# Patient Record
Sex: MALE | Race: WHITE | NOT HISPANIC OR LATINO | Employment: OTHER | URBAN - METROPOLITAN AREA
[De-identification: names, ages, dates, MRNs, and addresses within clinical notes are randomized per-mention and may not be internally consistent; named-entity substitution may affect disease eponyms.]

---

## 2017-11-20 ENCOUNTER — GENERIC CONVERSION - ENCOUNTER (OUTPATIENT)
Dept: OTHER | Facility: OTHER | Age: 62
End: 2017-11-20

## 2018-01-22 VITALS
HEART RATE: 84 BPM | SYSTOLIC BLOOD PRESSURE: 140 MMHG | RESPIRATION RATE: 20 BRPM | HEIGHT: 68 IN | WEIGHT: 261 LBS | TEMPERATURE: 98.3 F | DIASTOLIC BLOOD PRESSURE: 102 MMHG | BODY MASS INDEX: 39.56 KG/M2

## 2018-02-12 ENCOUNTER — OFFICE VISIT (OUTPATIENT)
Dept: FAMILY MEDICINE CLINIC | Facility: CLINIC | Age: 63
End: 2018-02-12
Payer: COMMERCIAL

## 2018-02-12 VITALS
RESPIRATION RATE: 20 BRPM | DIASTOLIC BLOOD PRESSURE: 74 MMHG | TEMPERATURE: 99.4 F | BODY MASS INDEX: 40.16 KG/M2 | WEIGHT: 265 LBS | HEART RATE: 92 BPM | SYSTOLIC BLOOD PRESSURE: 134 MMHG | HEIGHT: 68 IN

## 2018-02-12 DIAGNOSIS — J01.00 ACUTE NON-RECURRENT MAXILLARY SINUSITIS: Primary | ICD-10-CM

## 2018-02-12 PROCEDURE — 99213 OFFICE O/P EST LOW 20 MIN: CPT | Performed by: NURSE PRACTITIONER

## 2018-02-12 PROCEDURE — 3008F BODY MASS INDEX DOCD: CPT | Performed by: NURSE PRACTITIONER

## 2018-02-12 RX ORDER — AMOXICILLIN AND CLAVULANATE POTASSIUM 875; 125 MG/1; MG/1
1 TABLET, FILM COATED ORAL EVERY 12 HOURS SCHEDULED
Qty: 20 TABLET | Refills: 0 | Status: SHIPPED | OUTPATIENT
Start: 2018-02-12 | End: 2018-02-22

## 2018-02-12 NOTE — PATIENT INSTRUCTIONS

## 2018-02-12 NOTE — LETTER
February 12, 2018     Patient: Kae Lias   YOB: 1955   Date of Visit: 2/12/2018       To Whom it May Concern:    Jaz Collazo is under my professional care  He was seen in my office on 2/12/2018  He may return to work on 2/14/18  If you have any questions or concerns, please don't hesitate to call           Sincerely,          ELIEL Bowie        CC: No Recipients

## 2018-02-12 NOTE — PROGRESS NOTES
Assessment/Plan:       Diagnoses and all orders for this visit:    Acute non-recurrent maxillary sinusitis  -     amoxicillin-clavulanate (AUGMENTIN) 875-125 mg per tablet; Take 1 tablet by mouth every 12 (twelve) hours for 10 days  Drinking plenty of fluids, saline nasal rinses or nasal spray, and steam or cool air humidification are all effective ways of managing symptoms  May take Mucinex D for sinus congestion, or Coricidin HBP if you have a heart condition or high blood pressure  Mucinex or Robitussin DM are used to control cough symptoms and include an expectorant  May take Ibuprofen or Tylenol as needed for pain or fevers  Recommend recheck if symptoms do not resolve or improve within 1 week  BMI 40 0-44 9, adult ([facility] 75 )              No Follow-up on file  Subjective:      Patient ID: Boyd Byrnes is a 57 y o  male  Chief Complaint   Patient presents with    Sore Throat     Started 4 days ago  Afebrile  4-5 days ago he developed sinus congestion, sore throat, cough  Denies fevers, SOB, or wheezing  Cough is intermittently productive  He is taking Delsym and Mucinex OTC which do help  Denies abdominal pain, n/v/d  He did not have a flu shot this season  The following portions of the patient's history were reviewed and updated as appropriate: allergies, current medications, past family history, past medical history, past social history, past surgical history and problem list     Review of Systems   Constitutional: Positive for chills and fatigue  Negative for fever  HENT: Positive for congestion, sinus pressure and sore throat  Negative for ear pain, postnasal drip and rhinorrhea  Respiratory: Positive for cough  Negative for shortness of breath and wheezing  Cardiovascular: Negative for chest pain  Gastrointestinal: Negative for abdominal pain, diarrhea, nausea and vomiting  Musculoskeletal: Negative for arthralgias  Skin: Negative for rash  Neurological: Negative for headaches  Current Outpatient Prescriptions   Medication Sig Dispense Refill    Multiple Vitamin (MULTIVITAMINS PO) Take by mouth Daily      amoxicillin-clavulanate (AUGMENTIN) 875-125 mg per tablet Take 1 tablet by mouth every 12 (twelve) hours for 10 days 20 tablet 0     No current facility-administered medications for this visit  Objective:    /74   Pulse 92   Temp 99 4 °F (37 4 °C)   Resp 20   Ht 5' 8" (1 727 m)   Wt 120 kg (265 lb)   BMI 40 29 kg/m²        Physical Exam   Constitutional: He appears well-developed and well-nourished  HENT:   Right Ear: External ear and ear canal normal  Tympanic membrane is bulging (purulent effusion)  Left Ear: External ear and ear canal normal  Tympanic membrane is bulging  Nose: No mucosal edema  Left sinus exhibits maxillary sinus tenderness  Mouth/Throat: Oropharynx is clear and moist and mucous membranes are normal    Eyes: Conjunctivae are normal    Cardiovascular: Normal rate, regular rhythm and normal heart sounds  Pulmonary/Chest: Effort normal and breath sounds normal    Abdominal: Bowel sounds are normal  He exhibits no distension  There is no splenomegaly or hepatomegaly  There is no tenderness  Lymphadenopathy:        Right cervical: No superficial cervical adenopathy present  Left cervical: No superficial cervical adenopathy present  Skin: No rash noted  Psychiatric: He has a normal mood and affect                Albino Rayray

## 2019-04-25 ENCOUNTER — TELEPHONE (OUTPATIENT)
Dept: FAMILY MEDICINE CLINIC | Facility: CLINIC | Age: 64
End: 2019-04-25

## 2019-08-31 ENCOUNTER — OFFICE VISIT (OUTPATIENT)
Dept: URGENT CARE | Facility: CLINIC | Age: 64
End: 2019-08-31
Payer: COMMERCIAL

## 2019-08-31 VITALS
WEIGHT: 256.6 LBS | RESPIRATION RATE: 16 BRPM | HEART RATE: 73 BPM | SYSTOLIC BLOOD PRESSURE: 165 MMHG | HEIGHT: 68 IN | BODY MASS INDEX: 38.89 KG/M2 | OXYGEN SATURATION: 97 % | TEMPERATURE: 99.4 F | DIASTOLIC BLOOD PRESSURE: 88 MMHG

## 2019-08-31 DIAGNOSIS — J06.9 ACUTE URI: Primary | ICD-10-CM

## 2019-08-31 PROCEDURE — 99213 OFFICE O/P EST LOW 20 MIN: CPT | Performed by: PHYSICIAN ASSISTANT

## 2019-08-31 RX ORDER — FLUTICASONE PROPIONATE 50 MCG
2 SPRAY, SUSPENSION (ML) NASAL DAILY
Qty: 16 G | Refills: 0 | Status: SHIPPED | OUTPATIENT
Start: 2019-08-31 | End: 2022-03-21

## 2019-08-31 NOTE — PROGRESS NOTES
[facility]        NAME: Leif Yancey is a 75 y o  male  : 1955    MRN: [id number]  DATE: 2019  TIME: 2:39 PM    Assessment and Plan   Acute URI [J06 9]  1  Acute URI  fluticasone (FLONASE) 50 mcg/act nasal spray     Patient Instructions     Continue Mucinex for nasal congestion relief  Use flonase as directed  Chloraseptic spray, saltwater gargles, warm tea with honey, and throat lozenges may be relieving of throat discomfort  Use a cool mist humidifier at bedtime, turning on hours prior to bed with your bedroom doors shut for maximum relief  Follow up with your family doctor in 3-5 days  Proceed to the ER if symptoms worsen  Advised patient to initiate an antihistamine such as Zyrtec for seasonal/environmental allergies  The diagnosis, viral vs  allergic etiology, expected course of illness, and treatment plan were reviewed  All questions answered  Precautions given  Patient verbalized understanding and agreement with the treatment plan  Chief Complaint     Chief Complaint   Patient presents with    Cold Like Symptoms     sore throat x 1 week  productive cough yellow/green      History of Present Illness   24-year-old male presenting with complaint of sore throat x 5 days  Sx associated with NC, runny nose, PND, and an intermittently productive cough  Wife reports he felt warm early in illness but no temperature taken  Pt denies feeling feverish  He notes sx are worse in the morning, then tend to decrease as the day goes on  He has attempted treating with Mucinex with increased mucus production and slight relief of sx  He denies any chills, sweats, fatigue, chest tightness/pain, SOB, wheezing, abd pain, N/V/D  No sick contacts  No recent travel  Nonsmoker  His wife reports seasonal and environmental allergies that he does not treat, and believes ongoing inflammation from this may have caused current infection       Review of Systems   Review of Systems   Constitutional: Negative for chills, diaphoresis and fatigue  HENT: Positive for congestion, postnasal drip, rhinorrhea and sore throat  Negative for ear pain  Respiratory: Positive for cough  Negative for chest tightness, shortness of breath and wheezing  Gastrointestinal: Negative for abdominal pain, diarrhea, nausea and vomiting  Musculoskeletal: Negative for arthralgias and myalgias  Skin: Negative for rash  Neurological: Negative for dizziness and headaches  Current Medications       Current Outpatient Medications:     fluticasone (FLONASE) 50 mcg/act nasal spray, 2 sprays into each nostril daily, Disp: 16 g, Rfl: 0    Multiple Vitamin (MULTIVITAMINS PO), Take by mouth Daily, Disp: , Rfl:     Current Allergies     Allergies as of 08/31/2019    (No Known Allergies)            The following portions of the patient's history were reviewed and updated as appropriate: allergies, current medications, past family history, past medical history, past social history, past surgical history and problem list      Past Medical History:   Diagnosis Date    Abnormal weight gain 06/12/2008    LAST ASSESSED: 6/12/08    Anal fissure 03/05/2010    LAST ASSESSED: 3/5/10     History reviewed  No pertinent surgical history  Family History   Problem Relation Age of Onset    Diabetes Mother         MELLITUS    Hypertension Mother      Medications have been verified  Objective   /88   Pulse 73   Temp 99 4 °F (37 4 °C)   Resp 16   Ht 5' 7 5" (1 715 m)   Wt 116 kg (256 lb 9 6 oz)   SpO2 97%   BMI 39 60 kg/m²      Physical Exam     Physical Exam   Constitutional: He is oriented to person, place, and time  Vital signs are normal  He appears well-developed and well-nourished  He is cooperative  He does not appear ill  No distress  HENT:   Head: Normocephalic and atraumatic  Right Ear: Hearing, external ear and ear canal normal  Tympanic membrane is perforated   Tympanic membrane is not injected, not erythematous, not retracted and not bulging  No middle ear effusion  Left Ear: Hearing, tympanic membrane, external ear and ear canal normal   No middle ear effusion  Nose: Mucosal edema and rhinorrhea present  Mouth/Throat: Uvula is midline and mucous membranes are normal  Mucous membranes are not pale, not dry and not cyanotic  No oral lesions  No uvula swelling  Posterior oropharyngeal erythema (injected) present  No oropharyngeal exudate, posterior oropharyngeal edema or tonsillar abscesses  Tonsils are 0 on the right  Tonsils are 0 on the left  NC and PND present  Eyes: Conjunctivae and lids are normal  Right eye exhibits no discharge and no exudate  Left eye exhibits no discharge and no exudate  Neck: Trachea normal and phonation normal  Neck supple  No tracheal tenderness present  No neck rigidity  No edema and no erythema present  Cardiovascular: Normal rate, regular rhythm and normal heart sounds  Exam reveals no gallop, no distant heart sounds and no friction rub  No murmur heard  Pulmonary/Chest: Effort normal and breath sounds normal  No stridor  No respiratory distress  He has no decreased breath sounds  He has no wheezes  He has no rhonchi  He has no rales  Abdominal: Soft  Bowel sounds are normal  He exhibits no distension and no mass  There is no tenderness  There is no rigidity, no rebound and no guarding  Lymphadenopathy:     He has cervical adenopathy (NT)  Right cervical: Superficial cervical adenopathy present  Left cervical: Superficial cervical adenopathy present  Neurological: He is alert and oriented to person, place, and time  He is not disoriented  No cranial nerve deficit  He exhibits normal muscle tone  Coordination normal    Skin: Skin is warm, dry and intact  No rash noted  He is not diaphoretic  No erythema  No pallor  Psychiatric: He has a normal mood and affect   His behavior is normal  Judgment and thought content normal    Nursing note and vitals reviewed

## 2019-08-31 NOTE — PATIENT INSTRUCTIONS
Continue Mucinex for nasal congestion relief  Use flonase as directed  Chloraseptic spray, saltwater gargles, warm tea with honey, and throat lozenges may be relieving of throat discomfort  Use a cool mist humidifier at bedtime, turning on hours prior to bed with your bedroom doors shut for maximum relief  Follow up with your family doctor in 3-5 days  Proceed to the ER if symptoms worsen  Upper Respiratory Infection   WHAT YOU NEED TO KNOW:   An upper respiratory infection is also called the common cold  It is an infection that can affect your nose, throat, ears, and sinuses  For healthy people, the common cold is usually not serious and does not need special treatment  Cold symptoms are usually worst for the first 3 to 5 days  Most people get better in 7 to 14 days  You may continue to cough for 2 to 3 weeks  Colds are caused by viruses and do not get better with antibiotics  DISCHARGE INSTRUCTIONS:   Return to the emergency department if:   · You have chest pain or trouble breathing  Contact your healthcare provider if:   · You have a fever over 102ºF (39°C)  · Your sore throat gets worse or you see white or yellow spots in your throat  · Your symptoms get worse after 3 to 5 days or your cold is not better in 14 days  · You have a rash anywhere on your skin  · You have large, tender lumps in your neck  · You have thick, green or yellow drainage from your nose  · You cough up thick yellow, green, or bloody mucus  · You have vomiting for more than 24 hours and cannot keep fluids down  · You have a bad earache  · You have questions or concerns about your condition or care  Medicines: You may need any of the following:  · Decongestants  help reduce nasal congestion and help you breathe more easily  If you take decongestant pills, they may make you feel restless or cause problems with your sleep  Do not use decongestant sprays for more than a few days      · Cough suppressants help reduce coughing  Ask your healthcare provider which type of cough medicine is best for you  · NSAIDs , such as ibuprofen, help decrease swelling, pain, and fever  NSAIDs can cause stomach bleeding or kidney problems in certain people  If you take blood thinner medicine, always ask your healthcare provider if NSAIDs are safe for you  Always read the medicine label and follow directions  · Acetaminophen  decreases pain and fever  It is available without a doctor's order  Ask how much to take and how often to take it  Follow directions  Read the labels of all other medicines you are using to see if they also contain acetaminophen, or ask your doctor or pharmacist  Acetaminophen can cause liver damage if not taken correctly  Do not use more than 4 grams (4,000 milligrams) total of acetaminophen in one day  · Take your medicine as directed  Contact your healthcare provider if you think your medicine is not helping or if you have side effects  Tell him or her if you are allergic to any medicine  Keep a list of the medicines, vitamins, and herbs you take  Include the amounts, and when and why you take them  Bring the list or the pill bottles to follow-up visits  Carry your medicine list with you in case of an emergency  Follow up with your healthcare provider as directed:  Write down your questions so you remember to ask them during your visits  Self-care:   · Rest as much as possible  Slowly start to do more each day  · Drink more liquids as directed  Liquids will help thin and loosen mucus so you can cough it up  Liquids will also help prevent dehydration  Liquids that help prevent dehydration include water, fruit juice, and broth  Do not drink liquids that contain caffeine  Caffeine can increase your risk for dehydration  Ask your healthcare provider how much liquid to drink each day  · Soothe a sore throat  Gargle with warm salt water  This helps your sore throat feel better   Make salt water by dissolving ¼ teaspoon salt in 1 cup warm water  You may also suck on hard candy or throat lozenges  You may use a sore throat spray  · Use a humidifier or vaporizer  Use a cool mist humidifier or a vaporizer to increase air moisture in your home  This may make it easier for you to breathe and help decrease your cough  · Use saline nasal drops as directed  These help relieve congestion  · Apply petroleum-based jelly around the outside of your nostrils  This can decrease irritation from blowing your nose  · Do not smoke  Nicotine and other chemicals in cigarettes and cigars can make your symptoms worse  They can also cause infections such as bronchitis or pneumonia  Ask your healthcare provider for information if you currently smoke and need help to quit  E-cigarettes or smokeless tobacco still contain nicotine  Talk to your healthcare provider before you use these products  Prevent spreading your cold to others:   · Try to stay away from other people during the first 2 to 3 days of your cold when it is more easily spread  · Do not share food or drinks  · Do not share hand towels with household members  · Wash your hands often, especially after you blow your nose  Turn away from other people and cover your mouth and nose with a tissue when you sneeze or cough  © 2017 [facility] Information is for End User's use only and may not be sold, redistributed or otherwise used for commercial purposes  All illustrations and images included in CareNotes® are the copyrighted property of [employer]  or [employer]  The above information is an  only  It is not intended as medical advice for individual conditions or treatments  Talk to your doctor, nurse or pharmacist before following any medical regimen to see if it is safe and effective for you

## 2020-03-13 ENCOUNTER — TELEPHONE (OUTPATIENT)
Dept: FAMILY MEDICINE CLINIC | Facility: CLINIC | Age: 65
End: 2020-03-13

## 2021-01-29 ENCOUNTER — OFFICE VISIT (OUTPATIENT)
Dept: FAMILY MEDICINE CLINIC | Facility: CLINIC | Age: 66
End: 2021-01-29
Payer: MEDICARE

## 2021-01-29 VITALS
HEIGHT: 68 IN | BODY MASS INDEX: 37.89 KG/M2 | WEIGHT: 250 LBS | SYSTOLIC BLOOD PRESSURE: 130 MMHG | HEART RATE: 89 BPM | RESPIRATION RATE: 16 BRPM | OXYGEN SATURATION: 99 % | TEMPERATURE: 98.3 F | DIASTOLIC BLOOD PRESSURE: 80 MMHG

## 2021-01-29 DIAGNOSIS — Z23 IMMUNIZATION DUE: ICD-10-CM

## 2021-01-29 DIAGNOSIS — Z12.5 PROSTATE CANCER SCREENING: ICD-10-CM

## 2021-01-29 DIAGNOSIS — Z11.59 NEED FOR HEPATITIS C SCREENING TEST: ICD-10-CM

## 2021-01-29 DIAGNOSIS — Z00.00 MEDICARE ANNUAL WELLNESS VISIT, INITIAL: Primary | ICD-10-CM

## 2021-01-29 DIAGNOSIS — Z13.89 SCREENING FOR CARDIOVASCULAR, RESPIRATORY, AND GENITOURINARY DISEASES: ICD-10-CM

## 2021-01-29 DIAGNOSIS — Z12.11 COLON CANCER SCREENING: ICD-10-CM

## 2021-01-29 DIAGNOSIS — E66.9 OBESITY (BMI 30-39.9): ICD-10-CM

## 2021-01-29 DIAGNOSIS — Z13.6 SCREENING FOR CARDIOVASCULAR, RESPIRATORY, AND GENITOURINARY DISEASES: ICD-10-CM

## 2021-01-29 DIAGNOSIS — Z13.1 SCREENING FOR DIABETES MELLITUS (DM): ICD-10-CM

## 2021-01-29 DIAGNOSIS — Z13.83 SCREENING FOR CARDIOVASCULAR, RESPIRATORY, AND GENITOURINARY DISEASES: ICD-10-CM

## 2021-01-29 PROCEDURE — 90715 TDAP VACCINE 7 YRS/> IM: CPT | Performed by: FAMILY MEDICINE

## 2021-01-29 PROCEDURE — 1123F ACP DISCUSS/DSCN MKR DOCD: CPT | Performed by: FAMILY MEDICINE

## 2021-01-29 PROCEDURE — G0438 PPPS, INITIAL VISIT: HCPCS | Performed by: FAMILY MEDICINE

## 2021-01-29 PROCEDURE — 90471 IMMUNIZATION ADMIN: CPT | Performed by: FAMILY MEDICINE

## 2021-01-29 PROCEDURE — 90670 PCV13 VACCINE IM: CPT | Performed by: FAMILY MEDICINE

## 2021-01-29 PROCEDURE — G0009 ADMIN PNEUMOCOCCAL VACCINE: HCPCS | Performed by: FAMILY MEDICINE

## 2021-01-29 NOTE — PROGRESS NOTES
Assessment/Plan:    No problem-specific Assessment & Plan notes found for this encounter  Diagnoses and all orders for this visit:    Medicare annual wellness visit, initial    Screening for cardiovascular, respiratory, and genitourinary diseases  -     Lipid Panel with Direct LDL reflex; Future    Screening for diabetes mellitus (DM)  -     Comprehensive metabolic panel; Future    Prostate cancer screening  -     PSA, Total Screen; Future    Colon cancer screening  -     Ambulatory referral to Gastroenterology; Future    Immunization due  -     PNEUMOCOCCAL CONJUGATE VACCINE 13-VALENT GREATER THAN 6 MONTHS  -     TDAP VACCINE GREATER THAN OR EQUAL TO 8YO IM    Obesity (BMI 30-39  9)    Need for hepatitis C screening test  -     Hepatitis C antibody; Future              No follow-ups on file  Subjective:      Patient ID: John Delacruz is a 77 y o  male  Chief Complaint   Patient presents with    Physical Exam     wmcma       HPI  Here for AWV-initial  Medicare started 2y ago    Grandchild on way  Wants [facility]    Eye doctor [facility] [phone]   2x/w exercise, bike    The following portions of the patient's history were reviewed and updated as appropriate: allergies, current medications, past family history, past medical history, past social history, past surgical history and problem list     Review of Systems   Respiratory: Negative for shortness of breath  Cardiovascular: Negative for chest pain  Current Outpatient Medications   Medication Sig Dispense Refill    fluticasone (FLONASE) 50 mcg/act nasal spray 2 sprays into each nostril daily 16 g 0    Multiple Vitamin (MULTIVITAMINS PO) Take by mouth Daily       No current facility-administered medications for this visit  Objective:    /80   Pulse 89   Temp 98 3 °F (36 8 °C)   Resp 16   Ht 5' 8" (1 727 m)   Wt 113 kg (250 lb)   SpO2 99%   BMI 38 01 kg/m²        Physical Exam  Vitals signs and nursing note reviewed  Constitutional:       Appearance: He is well-developed  HENT:      Head: Normocephalic  Eyes:      Conjunctiva/sclera: Conjunctivae normal    Neck:      Musculoskeletal: Neck supple  Cardiovascular:      Rate and Rhythm: Normal rate  Pulmonary:      Effort: Pulmonary effort is normal  No respiratory distress  Abdominal:      Palpations: Abdomen is soft  Genitourinary:     Penis: Normal        Scrotum/Testes: Normal       Prostate: Normal       Rectum: Normal    Musculoskeletal:         General: No deformity  Skin:     General: Skin is warm and dry  Neurological:      Mental Status: He is alert  Psychiatric:         Behavior: Behavior normal          Thought Content: Thought content normal        BMI Counseling: Body mass index is 38 01 kg/m²  The BMI is above normal  Nutrition recommendations include decreasing portion sizes and moderation in carbohydrate intake  Exercise recommendations include exercising 3-5 times per week  No pharmacotherapy was ordered                  Nadia Klein DO

## 2021-01-30 NOTE — PATIENT INSTRUCTIONS

## 2021-01-30 NOTE — PROGRESS NOTES
Assessment and Plan:     Problem List Items Addressed This Visit        Active Problems    Obesity (BMI 30-39  9)    Immunization due    Relevant Orders    PNEUMOCOCCAL CONJUGATE VACCINE 13-VALENT GREATER THAN 6 MONTHS (Completed)    TDAP VACCINE GREATER THAN OR EQUAL TO 6YO IM (Completed)    Colon cancer screening    Relevant Orders    Ambulatory referral to Gastroenterology    Prostate cancer screening    Relevant Orders    PSA, Total Screen    Screening for diabetes mellitus (DM)    Relevant Orders    Comprehensive metabolic panel    Screening for cardiovascular, respiratory, and genitourinary diseases    Relevant Orders    Lipid Panel with Direct LDL reflex    Medicare annual wellness visit, initial - Primary    Need for hepatitis C screening test    Relevant Orders    Hepatitis C antibody           Preventive health issues were discussed with patient, and age appropriate screening tests were ordered as noted in patient's After Visit Summary  Personalized health advice and appropriate referrals for health education or preventive services given if needed, as noted in patient's After Visit Summary  History of Present Illness:     Patient presents for Medicare Annual Wellness visit    Patient Care Team:  Charanjit Spears DO as PCP - General     Problem List:     Patient Active Problem List   Diagnosis    Allergic rhinitis    BMI 38 0-38 9,adult    Obesity (BMI 30-39  9)    Immunization due    Colon cancer screening    Prostate cancer screening    Screening for diabetes mellitus (DM)    Screening for cardiovascular, respiratory, and genitourinary diseases    Medicare annual wellness visit, initial    Need for hepatitis C screening test      Past Medical and Surgical History:     Past Medical History:   Diagnosis Date    Abnormal weight gain 06/12/2008    LAST ASSESSED: 6/12/08    Anal fissure 03/05/2010    LAST ASSESSED: 3/5/10     History reviewed  No pertinent surgical history     Family History: Family History   Problem Relation Age of Onset    Diabetes Mother         MELLITUS    Hypertension Mother       Social History:        Social History     Socioeconomic History    Marital status: /Civil Union     Spouse name: None    Number of children: None    Years of education: None    Highest education level: None   Occupational History    None   Social Needs    Financial resource strain: None    Food insecurity     Worry: None     Inability: None    Transportation needs     Medical: None     Non-medical: None   Tobacco Use    Smoking status: Former Smoker    Smokeless tobacco: Never Used   Substance and Sexual Activity    Alcohol use: Never     Frequency: Never    Drug use: Never    Sexual activity: None   Lifestyle    Physical activity     Days per week: None     Minutes per session: None    Stress: None   Relationships    Social connections     Talks on phone: None     Gets together: None     Attends Baptism service: None     Active member of club or organization: None     Attends meetings of clubs or organizations: None     Relationship status: None    Intimate partner violence     Fear of current or ex partner: None     Emotionally abused: None     Physically abused: None     Forced sexual activity: None   Other Topics Concern    None   Social History Narrative    None      Medications and Allergies:     Current Outpatient Medications   Medication Sig Dispense Refill    fluticasone (FLONASE) 50 mcg/act nasal spray 2 sprays into each nostril daily 16 g 0    Multiple Vitamin (MULTIVITAMINS PO) Take by mouth Daily       No current facility-administered medications for this visit        No Known Allergies   Immunizations:     Immunization History   Administered Date(s) Administered    Pneumococcal Conjugate 13-Valent 01/29/2021    Tdap 01/29/2021    Varicella 05/20/1963      Health Maintenance:         Topic Date Due    Hepatitis C Screening  1955    Colorectal Cancer Screening  05/20/2003         Topic Date Due    DTaP,Tdap,and Td Vaccines (1 - Tdap) 05/20/1974    Pneumococcal Vaccine: 65+ Years (1 of 1 - PPSV23) 05/20/2018      Medicare Health Risk Assessment:     /80   Pulse 89   Temp 98 3 °F (36 8 °C)   Resp 16   Ht 5' 8" (1 727 m)   Wt 113 kg (250 lb)   SpO2 99%   BMI 38 01 kg/m²      [facility] is here for his Initial Wellness visit  Last Medicare Wellness visit information reviewed, patient interviewed and updates made to the record today  Health Risk Assessment:   Patient rates overall health as good  Patient feels that their physical health rating is same  Eyesight was rated as same  Hearing was rated as same  Patient feels that their emotional and mental health rating is same  Pain experienced in the last 7 days has been none  Patient states that he has experienced no weight loss or gain in last 6 months  Depression Screening:   PHQ-2 Score: 0      Fall Risk Screening: In the past year, patient has experienced: no history of falling in past year      Home Safety:  Patient does not have trouble with stairs inside or outside of their home  Patient has working smoke alarms and has working carbon monoxide detector  Home safety hazards include: none  Nutrition:   Current diet is Regular and Low Carb  Medications:   Patient is currently taking over-the-counter supplements  OTC medications include: see medication list  Patient is able to manage medications  Activities of Daily Living (ADLs)/Instrumental Activities of Daily Living (IADLs):   Walk and transfer into and out of bed and chair?: Yes  Dress and groom yourself?: Yes    Bathe or shower yourself?: Yes    Feed yourself?  Yes  Do your laundry/housekeeping?: Yes  Manage your money, pay your bills and track your expenses?: Yes  Make your own meals?: Yes    Do your own shopping?: Yes    Advance Care Planning:   Living will: No      Cognitive Screening:   Provider or family/friend/caregiver concerned regarding cognition?: No    PREVENTIVE SCREENINGS      Cardiovascular Screening:    General: Risks and Benefits Discussed      Diabetes Screening:     General: Risks and Benefits Discussed      Colorectal Cancer Screening:     General: Risks and Benefits Discussed      Prostate Cancer Screening:    General: Risks and Benefits Discussed      Osteoporosis Screening:    General: Screening Not Indicated      Abdominal Aortic Aneurysm (AAA) Screening:    Risk factors include: age between 73-67 yo and tobacco use        General: Screening Not Indicated      Lung Cancer Screening:     General: Screening Not Indicated      Hepatitis C Screening:    General: Risks and Benefits Discussed    Other Counseling Topics:   Alcohol use counseling and regular weightbearing exercise         Juan Carlos Dent, DO

## 2021-03-30 DIAGNOSIS — Z23 ENCOUNTER FOR IMMUNIZATION: ICD-10-CM

## 2021-04-02 ENCOUNTER — IMMUNIZATIONS (OUTPATIENT)
Dept: FAMILY MEDICINE CLINIC | Facility: HOSPITAL | Age: 66
End: 2021-04-02

## 2021-04-02 DIAGNOSIS — Z23 ENCOUNTER FOR IMMUNIZATION: Primary | ICD-10-CM

## 2021-04-02 PROCEDURE — 0001A SARS-COV-2 / COVID-19 MRNA VACCINE (PFIZER-BIONTECH) 30 MCG: CPT

## 2021-04-02 PROCEDURE — 91300 SARS-COV-2 / COVID-19 MRNA VACCINE (PFIZER-BIONTECH) 30 MCG: CPT

## 2021-04-24 ENCOUNTER — IMMUNIZATIONS (OUTPATIENT)
Dept: FAMILY MEDICINE CLINIC | Facility: HOSPITAL | Age: 66
End: 2021-04-24

## 2021-04-24 DIAGNOSIS — Z23 ENCOUNTER FOR IMMUNIZATION: Primary | ICD-10-CM

## 2021-04-24 PROCEDURE — 91300 SARS-COV-2 / COVID-19 MRNA VACCINE (PFIZER-BIONTECH) 30 MCG: CPT

## 2021-04-24 PROCEDURE — 0002A SARS-COV-2 / COVID-19 MRNA VACCINE (PFIZER-BIONTECH) 30 MCG: CPT

## 2022-03-01 ENCOUNTER — TELEPHONE (OUTPATIENT)
Dept: FAMILY MEDICINE CLINIC | Facility: CLINIC | Age: 67
End: 2022-03-01

## 2022-03-21 ENCOUNTER — OFFICE VISIT (OUTPATIENT)
Dept: FAMILY MEDICINE CLINIC | Facility: CLINIC | Age: 67
End: 2022-03-21
Payer: MEDICARE

## 2022-03-21 VITALS
TEMPERATURE: 99.5 F | RESPIRATION RATE: 17 BRPM | HEART RATE: 88 BPM | WEIGHT: 257 LBS | DIASTOLIC BLOOD PRESSURE: 84 MMHG | OXYGEN SATURATION: 98 % | HEIGHT: 68 IN | SYSTOLIC BLOOD PRESSURE: 162 MMHG | BODY MASS INDEX: 38.95 KG/M2

## 2022-03-21 DIAGNOSIS — R19.7 DIARRHEA, UNSPECIFIED TYPE: Primary | ICD-10-CM

## 2022-03-21 DIAGNOSIS — R14.0 ABDOMINAL DISTENTION: ICD-10-CM

## 2022-03-21 DIAGNOSIS — R10.84 GENERALIZED ABDOMINAL PAIN: ICD-10-CM

## 2022-03-21 PROCEDURE — 99214 OFFICE O/P EST MOD 30 MIN: CPT | Performed by: NURSE PRACTITIONER

## 2022-03-21 NOTE — PATIENT INSTRUCTIONS
Eat low residue diet with food low in fiber and follow bland diet  Avoid Caffeine for 2 weeks  Avoid dairy products  Increase your fluid intake  Keep a record of the number of times you urinate during the day  You may slowly resume your normal level of activity once you feel better  Pepto Bismol and kaopectate as needed  Do not take any imodium for diarrhea  Follow up for no improvement and worsening of symptoms and for fever, localized and severe abdominal pain, recurrent nausea, vomiting, and diarrhea

## 2022-03-21 NOTE — PROGRESS NOTES
Assessment/Plan:  Abdomen is big as obese and will follow with CT to rule any other etiology for it  Advised to go to ER for any worsening    1  Diarrhea, unspecified type  -     CT abdomen pelvis w contrast; Future; Expected date: 03/21/2022    2  Abdominal distention  -     CT abdomen pelvis w contrast; Future; Expected date: 03/21/2022    3  Generalized abdominal pain  -     CT abdomen pelvis w contrast; Future; Expected date: 03/21/2022        Patient Instructions:  Eat low residue diet with food low in fiber and follow bland diet  Avoid Caffeine for 2 weeks  Avoid dairy products  Increase your fluid intake  Keep a record of the number of times you urinate during the day  You may slowly resume your normal level of activity once you feel better  Pepto Bismol and kaopectate as needed  Do not take any imodium for diarrhea  Follow up for no improvement and worsening of symptoms and for fever, localized and severe abdominal pain, recurrent nausea, vomiting, and diarrhea  Return if symptoms worsen or fail to improve  Future Appointments   Date Time Provider Melita Kelly   3/25/2022 12:00 PM [place] CT [address] [facility]   3/31/2022 10:45 AM DO ROMUL[facility]           Subjective:      Patient ID: Rosalba Dom is a 74 y o  male  Chief Complaint   Patient presents with    Diarrhea     started sunday nm  lpn    Abdominal Pain         Vitals:  /84   Pulse 88   Temp 99 5 °F (37 5 °C)   Resp 17   Ht 5' 8" (1 727 m)   Wt 117 kg (257 lb)   SpO2 98%   BMI 39 08 kg/m²   Wt Readings from Last 3 Encounters:   03/21/22 117 kg (257 lb)   01/29/21 113 kg (250 lb)   08/31/19 116 kg (256 lb 9 6 oz)     HPI  Started with diarrhea on 03/20/22 and today is slightly better but having abdominal pain all over and worse around umbilical area  Denies fever, chills and vomiting and eating out   Never had colonoscopy in past      PHQ-2/9 Depression Screening    Little interest or pleasure in doing things: 0 - not at all  Feeling down, depressed, or hopeless: 0 - not at all  PHQ-2 Score: 0  PHQ-2 Interpretation: Negative depression screen             The following portions of the patient's history were reviewed and updated as appropriate: allergies, current medications, past family history, past medical history, past social history, past surgical history and problem list       Review of Systems   Constitutional: Negative for appetite change, chills, fever and unexpected weight change  Respiratory: Negative  Cardiovascular: Negative  Gastrointestinal: Positive for abdominal pain and diarrhea  Negative for anal bleeding, blood in stool, constipation, nausea, rectal pain and vomiting  Genitourinary: Negative  Skin: Negative  Objective:    Social History     Tobacco Use   Smoking Status Former Smoker   Smokeless Tobacco Never Used       Allergies: No Known Allergies      Current Outpatient Medications   Medication Sig Dispense Refill    Multiple Vitamin (MULTIVITAMINS PO) Take by mouth Daily       No current facility-administered medications for this visit  Physical Exam  Constitutional:       Appearance: Normal appearance  He is well-developed  Cardiovascular:      Rate and Rhythm: Normal rate and regular rhythm  Heart sounds: Normal heart sounds  Pulmonary:      Effort: Pulmonary effort is normal       Breath sounds: Normal breath sounds  Abdominal:      General: Abdomen is protuberant  Bowel sounds are normal  There is distension  Palpations: Abdomen is soft  Tenderness: There is generalized abdominal tenderness and tenderness in the periumbilical area  There is no guarding or rebound  Skin:     General: Skin is warm and dry  Neurological:      Mental Status: He is alert and oriented to person, place, and time  Psychiatric:         Behavior: Behavior normal          Thought Content:  Thought content normal          Judgment: Judgment normal                      Erling Soda, CRNP

## 2022-03-25 ENCOUNTER — HOSPITAL ENCOUNTER (OUTPATIENT)
Dept: RADIOLOGY | Facility: HOSPITAL | Age: 67
Discharge: HOME | End: 2022-03-25
Payer: MEDICARE

## 2022-03-25 DIAGNOSIS — R19.7 DIARRHEA, UNSPECIFIED TYPE: ICD-10-CM

## 2022-03-25 DIAGNOSIS — R10.84 GENERALIZED ABDOMINAL PAIN: ICD-10-CM

## 2022-03-25 DIAGNOSIS — R14.0 ABDOMINAL DISTENTION: ICD-10-CM

## 2022-03-25 PROCEDURE — 74177 CT ABD & PELVIS W/CONTRAST: CPT

## 2022-03-25 RX ADMIN — IOHEXOL 100 ML: 350 INJECTION, SOLUTION INTRAVENOUS at 12:31

## 2022-03-31 ENCOUNTER — OFFICE VISIT (OUTPATIENT)
Dept: FAMILY MEDICINE CLINIC | Facility: CLINIC | Age: 67
End: 2022-03-31
Payer: MEDICARE

## 2022-03-31 ENCOUNTER — HOSPITAL ENCOUNTER (INPATIENT)
Facility: HOSPITAL | Age: 67
LOS: 3 days | Discharge: HOME HEALTH | DRG: 726 | End: 2022-04-03
Attending: EMERGENCY MEDICINE | Admitting: INTERNAL MEDICINE
Payer: MEDICARE

## 2022-03-31 VITALS
HEIGHT: 68 IN | SYSTOLIC BLOOD PRESSURE: 144 MMHG | DIASTOLIC BLOOD PRESSURE: 90 MMHG | TEMPERATURE: 97.9 F | BODY MASS INDEX: 38.19 KG/M2 | RESPIRATION RATE: 20 BRPM | OXYGEN SATURATION: 99 % | WEIGHT: 252 LBS | HEART RATE: 87 BPM

## 2022-03-31 DIAGNOSIS — N13.30 HYDROURETERONEPHROSIS: ICD-10-CM

## 2022-03-31 DIAGNOSIS — Z00.00 MEDICARE ANNUAL WELLNESS VISIT, SUBSEQUENT: Primary | ICD-10-CM

## 2022-03-31 DIAGNOSIS — Z13.83 SCREENING FOR CARDIOVASCULAR, RESPIRATORY, AND GENITOURINARY DISEASES: ICD-10-CM

## 2022-03-31 DIAGNOSIS — R10.9 ABDOMINAL PAIN, UNSPECIFIED ABDOMINAL LOCATION: ICD-10-CM

## 2022-03-31 DIAGNOSIS — N13.30 HYDRONEPHROSIS: ICD-10-CM

## 2022-03-31 DIAGNOSIS — I16.0 HYPERTENSIVE URGENCY: ICD-10-CM

## 2022-03-31 DIAGNOSIS — N13.9 URINARY (TRACT) OBSTRUCTION: ICD-10-CM

## 2022-03-31 DIAGNOSIS — N13.9 URINARY OBSTRUCTION: ICD-10-CM

## 2022-03-31 DIAGNOSIS — Z13.89 SCREENING FOR CARDIOVASCULAR, RESPIRATORY, AND GENITOURINARY DISEASES: ICD-10-CM

## 2022-03-31 DIAGNOSIS — Z12.5 PROSTATE CANCER SCREENING: ICD-10-CM

## 2022-03-31 DIAGNOSIS — N17.9 AKI (ACUTE KIDNEY INJURY) (HCC): Primary | ICD-10-CM

## 2022-03-31 DIAGNOSIS — Z13.6 SCREENING FOR CARDIOVASCULAR, RESPIRATORY, AND GENITOURINARY DISEASES: ICD-10-CM

## 2022-03-31 DIAGNOSIS — Z11.59 NEED FOR HEPATITIS C SCREENING TEST: ICD-10-CM

## 2022-03-31 DIAGNOSIS — Z13.1 SCREENING FOR DIABETES MELLITUS (DM): ICD-10-CM

## 2022-03-31 DIAGNOSIS — Z12.11 COLON CANCER SCREENING: ICD-10-CM

## 2022-03-31 DIAGNOSIS — R79.89 ELEVATED SERUM CREATININE: ICD-10-CM

## 2022-03-31 PROBLEM — N40.1 ENLARGED PROSTATE WITH URINARY OBSTRUCTION: Status: ACTIVE | Noted: 2022-03-31

## 2022-03-31 PROBLEM — I83.93 VARICOSE VEINS OF BOTH LOWER EXTREMITIES: Status: ACTIVE | Noted: 2022-03-31

## 2022-03-31 PROBLEM — N13.8 ENLARGED PROSTATE WITH URINARY OBSTRUCTION: Status: ACTIVE | Noted: 2022-03-31

## 2022-03-31 LAB
ALBUMIN SERPL BCP-MCNC: 3.7 G/DL (ref 3.5–5)
ALP SERPL-CCNC: 71 U/L (ref 46–116)
ALT SERPL W P-5'-P-CCNC: 22 U/L (ref 12–78)
ANION GAP SERPL CALCULATED.3IONS-SCNC: 9 MMOL/L (ref 4–13)
AST SERPL W P-5'-P-CCNC: 18 U/L (ref 5–45)
BASOPHILS # BLD AUTO: 0.03 THOUSANDS/ΜL (ref 0–0.1)
BASOPHILS NFR BLD AUTO: 0 % (ref 0–1)
BILIRUB SERPL-MCNC: 0.97 MG/DL (ref 0.2–1)
BILIRUB UR QL STRIP: NEGATIVE
BUN SERPL-MCNC: 34 MG/DL (ref 5–25)
CALCIUM SERPL-MCNC: 9.3 MG/DL (ref 8.3–10.1)
CHLORIDE SERPL-SCNC: 105 MMOL/L (ref 100–108)
CLARITY UR: CLEAR
CO2 SERPL-SCNC: 25 MMOL/L (ref 21–32)
COLOR UR: YELLOW
CREAT SERPL-MCNC: 2.06 MG/DL (ref 0.6–1.3)
EOSINOPHIL # BLD AUTO: 0.09 THOUSAND/ΜL (ref 0–0.61)
EOSINOPHIL NFR BLD AUTO: 1 % (ref 0–6)
ERYTHROCYTE [DISTWIDTH] IN BLOOD BY AUTOMATED COUNT: 12.9 % (ref 11.6–15.1)
GFR SERPL CREATININE-BSD FRML MDRD: 32 ML/MIN/1.73SQ M
GLUCOSE SERPL-MCNC: 123 MG/DL (ref 65–140)
GLUCOSE UR STRIP-MCNC: NEGATIVE MG/DL
HCT VFR BLD AUTO: 39.9 % (ref 36.5–49.3)
HGB BLD-MCNC: 13.4 G/DL (ref 12–17)
HGB UR QL STRIP.AUTO: NEGATIVE
IMM GRANULOCYTES # BLD AUTO: 0.04 THOUSAND/UL (ref 0–0.2)
IMM GRANULOCYTES NFR BLD AUTO: 1 % (ref 0–2)
KETONES UR STRIP-MCNC: NEGATIVE MG/DL
LEUKOCYTE ESTERASE UR QL STRIP: NEGATIVE
LYMPHOCYTES # BLD AUTO: 1.5 THOUSANDS/ΜL (ref 0.6–4.47)
LYMPHOCYTES NFR BLD AUTO: 17 % (ref 14–44)
MCH RBC QN AUTO: 29.6 PG (ref 26.8–34.3)
MCHC RBC AUTO-ENTMCNC: 33.6 G/DL (ref 31.4–37.4)
MCV RBC AUTO: 88 FL (ref 82–98)
MONOCYTES # BLD AUTO: 0.55 THOUSAND/ΜL (ref 0.17–1.22)
MONOCYTES NFR BLD AUTO: 6 % (ref 4–12)
NEUTROPHILS # BLD AUTO: 6.5 THOUSANDS/ΜL (ref 1.85–7.62)
NEUTS SEG NFR BLD AUTO: 75 % (ref 43–75)
NITRITE UR QL STRIP: NEGATIVE
NRBC BLD AUTO-RTO: 0 /100 WBCS
PH UR STRIP.AUTO: 5 [PH] (ref 4.5–8)
PLATELET # BLD AUTO: 199 THOUSANDS/UL (ref 149–390)
PMV BLD AUTO: 9.8 FL (ref 8.9–12.7)
POTASSIUM SERPL-SCNC: 3.6 MMOL/L (ref 3.5–5.3)
PROT SERPL-MCNC: 7.7 G/DL (ref 6.4–8.2)
PROT UR STRIP-MCNC: NEGATIVE MG/DL
RBC # BLD AUTO: 4.52 MILLION/UL (ref 3.88–5.62)
SL AMB  POCT GLUCOSE, UA: NEGATIVE
SL AMB LEUKOCYTE ESTERASE,UA: NEGATIVE
SL AMB POCT BILIRUBIN,UA: NEGATIVE
SL AMB POCT BLOOD,UA: NEGATIVE
SL AMB POCT CLARITY,UA: CLEAR
SL AMB POCT COLOR,UA: YELLOW
SL AMB POCT KETONES,UA: NEGATIVE
SL AMB POCT NITRITE,UA: NEGATIVE
SL AMB POCT PH,UA: 5.5
SL AMB POCT SPECIFIC GRAVITY,UA: 1.02
SL AMB POCT URINE PROTEIN: NEGATIVE
SL AMB POCT UROBILINOGEN: 0.2
SODIUM SERPL-SCNC: 139 MMOL/L (ref 136–145)
SP GR UR STRIP.AUTO: 1.01 (ref 1–1.03)
UROBILINOGEN UR QL STRIP.AUTO: 0.2 E.U./DL
WBC # BLD AUTO: 8.71 THOUSAND/UL (ref 4.31–10.16)

## 2022-03-31 PROCEDURE — 81003 URINALYSIS AUTO W/O SCOPE: CPT

## 2022-03-31 PROCEDURE — 81002 URINALYSIS NONAUTO W/O SCOPE: CPT | Performed by: FAMILY MEDICINE

## 2022-03-31 PROCEDURE — 36415 COLL VENOUS BLD VENIPUNCTURE: CPT | Performed by: EMERGENCY MEDICINE

## 2022-03-31 PROCEDURE — 85025 COMPLETE CBC W/AUTO DIFF WBC: CPT | Performed by: EMERGENCY MEDICINE

## 2022-03-31 PROCEDURE — 51798 US URINE CAPACITY MEASURE: CPT

## 2022-03-31 PROCEDURE — 99284 EMERGENCY DEPT VISIT MOD MDM: CPT

## 2022-03-31 PROCEDURE — 80053 COMPREHEN METABOLIC PANEL: CPT | Performed by: EMERGENCY MEDICINE

## 2022-03-31 PROCEDURE — 0T9B70Z DRAINAGE OF BLADDER WITH DRAINAGE DEVICE, VIA NATURAL OR ARTIFICIAL OPENING: ICD-10-PCS | Performed by: EMERGENCY MEDICINE

## 2022-03-31 PROCEDURE — G0439 PPPS, SUBSEQ VISIT: HCPCS | Performed by: FAMILY MEDICINE

## 2022-03-31 PROCEDURE — 99215 OFFICE O/P EST HI 40 MIN: CPT | Performed by: FAMILY MEDICINE

## 2022-03-31 PROCEDURE — 99285 EMERGENCY DEPT VISIT HI MDM: CPT | Performed by: EMERGENCY MEDICINE

## 2022-03-31 PROCEDURE — 99223 1ST HOSP IP/OBS HIGH 75: CPT | Performed by: INTERNAL MEDICINE

## 2022-03-31 PROCEDURE — 99222 1ST HOSP IP/OBS MODERATE 55: CPT | Performed by: NURSE PRACTITIONER

## 2022-03-31 RX ORDER — AMOXICILLIN 250 MG
1 CAPSULE ORAL
Status: DISCONTINUED | OUTPATIENT
Start: 2022-03-31 | End: 2022-04-03 | Stop reason: HOSPADM

## 2022-03-31 RX ORDER — METOPROLOL TARTRATE 5 MG/5ML
5 INJECTION INTRAVENOUS EVERY 8 HOURS PRN
Status: DISCONTINUED | OUTPATIENT
Start: 2022-03-31 | End: 2022-04-01

## 2022-03-31 RX ORDER — TAMSULOSIN HYDROCHLORIDE 0.4 MG/1
0.4 CAPSULE ORAL
Status: DISCONTINUED | OUTPATIENT
Start: 2022-03-31 | End: 2022-04-03 | Stop reason: HOSPADM

## 2022-03-31 RX ORDER — SODIUM CHLORIDE, SODIUM GLUCONATE, SODIUM ACETATE, POTASSIUM CHLORIDE, MAGNESIUM CHLORIDE, SODIUM PHOSPHATE, DIBASIC, AND POTASSIUM PHOSPHATE .53; .5; .37; .037; .03; .012; .00082 G/100ML; G/100ML; G/100ML; G/100ML; G/100ML; G/100ML; G/100ML
100 INJECTION, SOLUTION INTRAVENOUS CONTINUOUS
Status: DISCONTINUED | OUTPATIENT
Start: 2022-03-31 | End: 2022-04-01

## 2022-03-31 RX ORDER — POLYETHYLENE GLYCOL 3350 17 G/17G
17 POWDER, FOR SOLUTION ORAL DAILY
Status: DISCONTINUED | OUTPATIENT
Start: 2022-04-01 | End: 2022-04-02

## 2022-03-31 RX ADMIN — SENNOSIDES AND DOCUSATE SODIUM 1 TABLET: 50; 8.6 TABLET ORAL at 21:04

## 2022-03-31 RX ADMIN — TAMSULOSIN HYDROCHLORIDE 0.4 MG: 0.4 CAPSULE ORAL at 18:36

## 2022-03-31 NOTE — PLAN OF CARE
Problem: Potential for Falls  Goal: Patient will remain free of falls  Description: INTERVENTIONS:  - Educate patient/family on patient safety including physical limitations  - Instruct patient to call for assistance with activity   - Consult OT/PT to assist with strengthening/mobility   - Keep Call bell within reach  - Keep bed low and locked with side rails adjusted as appropriate  - Keep care items and personal belongings within reach  - Initiate and maintain comfort rounds  - Make Fall Risk Sign visible to staff  - Offer Toileting every 2 Hours, in advance of need  - Initiate/Maintain 2alarm  - Obtain necessary fall risk management equipment: 2  - Apply yellow socks and bracelet for high fall risk patients  - Consider moving patient to room near nurses station  Outcome: Progressing     Problem: PAIN - ADULT  Goal: Verbalizes/displays adequate comfort level or baseline comfort level  Description: Interventions:  - Encourage patient to monitor pain and request assistance  - Assess pain using appropriate pain scale  - Administer analgesics based on type and severity of pain and evaluate response  - Implement non-pharmacological measures as appropriate and evaluate response  - Consider cultural and social influences on pain and pain management  - Notify physician/advanced practitioner if interventions unsuccessful or patient reports new pain  Outcome: Progressing     Problem: INFECTION - ADULT  Goal: Absence or prevention of progression during hospitalization  Description: INTERVENTIONS:  - Assess and monitor for signs and symptoms of infection  - Monitor lab/diagnostic results  - Monitor all insertion sites, i e  indwelling lines, tubes, and drains  - Monitor endotracheal if appropriate and nasal secretions for changes in amount and color  - [place] appropriate cooling/warming therapies per order  - Administer medications as ordered  - Instruct and encourage patient and family to use good hand hygiene technique  - Identify and instruct in appropriate isolation precautions for identified infection/condition  Outcome: Progressing     Problem: DISCHARGE PLANNING  Goal: Discharge to home or other facility with appropriate resources  Description: INTERVENTIONS:  - Identify barriers to discharge w/patient and caregiver  - Arrange for needed discharge resources and transportation as appropriate  - Identify discharge learning needs (meds, wound care, etc )  - Arrange for interpretive services to assist at discharge as needed  - Refer to Case Management Department for coordinating discharge planning if the patient needs post-hospital services based on physician/advanced practitioner order or complex needs related to functional status, cognitive ability, or social support system  Outcome: Progressing     Problem: Knowledge Deficit  Goal: Patient/family/caregiver demonstrates understanding of disease process, treatment plan, medications, and discharge instructions  Description: Complete learning assessment and assess knowledge base    Interventions:  - Provide teaching at level of understanding  - Provide teaching via preferred learning methods  Outcome: Progressing

## 2022-03-31 NOTE — ED NOTES
Provider Spoleti at bedside and is aware of patient blood pressure       Jada Johns RN  03/31/22 2613

## 2022-03-31 NOTE — PROGRESS NOTES
Assessment and Plan:     Problem List Items Addressed This Visit        Active Problems    Colon cancer screening    Relevant Orders    Ambulatory referral for colonoscopy    Prostate cancer screening    Relevant Orders    PSA, Total Screen    Screening for diabetes mellitus (DM)    Relevant Orders    Comprehensive metabolic panel    Screening for cardiovascular, respiratory, and genitourinary diseases    Relevant Orders    Lipid Panel with Direct LDL reflex    Need for hepatitis C screening test    Relevant Orders    Hepatitis C antibody    Hydroureteronephrosis    Urinary (tract) obstruction    Medicare annual wellness visit, subsequent - Primary      Other Visit Diagnoses     Abdominal pain, unspecified abdominal location        Relevant Orders    POCT urine dip (Completed)           Preventive health issues were discussed with patient, and age appropriate screening tests were ordered as noted in patient's After Visit Summary  Personalized health advice and appropriate referrals for health education or preventive services given if needed, as noted in patient's After Visit Summary  History of Present Illness:     Patient presents for Medicare Annual Wellness visit    Patient Care Team:  Magda Hector DO as PCP - General     Problem List:     Patient Active Problem List   Diagnosis    Allergic rhinitis    BMI 38 0-38 9,adult    Obesity (BMI 30-39  9)    Immunization due    Colon cancer screening    Prostate cancer screening    Screening for diabetes mellitus (DM)    Screening for cardiovascular, respiratory, and genitourinary diseases    Need for hepatitis C screening test    Hydroureteronephrosis    Urinary (tract) obstruction    Elevated serum creatinine    Hypertensive urgency    Enlarged prostate with urinary obstruction    Medicare annual wellness visit, subsequent      Past Medical and Surgical History:     Past Medical History:   Diagnosis Date    Abnormal weight gain 06/12/2008 LAST ASSESSED: 6/12/08    Anal fissure 03/05/2010    LAST ASSESSED: 3/5/10     History reviewed  No pertinent surgical history  Family History:     Family History   Problem Relation Age of Onset    Diabetes Mother         MELLITUS    Hypertension Mother       Social History:     Social History     Socioeconomic History    Marital status: /Civil Union     Spouse name: None    Number of children: None    Years of education: None    Highest education level: None   Occupational History    None   Tobacco Use    Smoking status: Former Smoker    Smokeless tobacco: Never Used   Substance and Sexual Activity    Alcohol use: Never    Drug use: Never    Sexual activity: None   Other Topics Concern    None   Social History Narrative    None     Social Determinants of Health     Financial Resource Strain: Not on file   Food Insecurity: Not on file   Transportation Needs: Not on file   Physical Activity: Not on file   Stress: Not on file   Social Connections: Not on file   Intimate Partner Violence: Not on file   Housing Stability: Not on file      Medications and Allergies:     No current facility-administered medications for this visit  No current outpatient medications on file       Facility-Administered Medications Ordered in Other Visits   Medication Dose Route Frequency Provider Last Rate Last Admin    [START ON 4/1/2022] enoxaparin (LOVENOX) subcutaneous injection 40 mg  40 mg Subcutaneous Daily Kaylee Capps DO        metoprolol (LOPRESSOR) injection 5 mg  5 mg Intravenous Q8H PRN Kaylee Capps DO        [START ON 4/1/2022] polyethylene glycol (MIRALAX) packet 17 g  17 g Oral Daily Kaylee Capps DO        senna-docusate sodium (SENOKOT S) 8 6-50 mg per tablet 1 tablet  1 tablet Oral HS Kaylee Capps DO        tamsulosin (FLOMAX) capsule 0 4 mg  0 4 mg Oral Daily With TXU López DO   0 4 mg at 03/31/22 1836     No Known Allergies   Immunizations: Immunization History   Administered Date(s) Administered    COVID-19 PFIZER VACCINE 0 3 ML IM 04/02/2021, 04/24/2021    Pneumococcal Conjugate 13-Valent 01/29/2021    Tdap 01/29/2021    Varicella 05/20/1963      Health Maintenance:         Topic Date Due    Hepatitis C Screening  Never done    Colorectal Cancer Screening  Never done         Topic Date Due    COVID-19 Vaccine (3 - Booster for Pfizer series) 09/24/2021    Pneumococcal Vaccine: 65+ Years (2 of 2 - PPSV23) 01/29/2022      Medicare Health Risk Assessment:     /90   Pulse 87   Temp 97 9 °F (36 6 °C)   Resp 20   Ht 5' 8" (1 727 m)   Wt 114 kg (252 lb)   SpO2 99%   BMI 38 32 kg/m²      Richmond Landers is here for his Subsequent Wellness visit  Last Medicare Wellness visit information reviewed, patient interviewed and updates made to the record today  Health Risk Assessment:   Patient rates overall health as fair  Patient feels that their physical health rating is same  Patient is satisfied with their life  Eyesight was rated as same  Hearing was rated as same  Patient feels that their emotional and mental health rating is same  Patients states they are sometimes angry  Patient states they are sometimes unusually tired/fatigued  Pain experienced in the last 7 days has been none  Patient states that he has experienced no weight loss or gain in last 6 months  Depression Screening:   PHQ-2 Score: 0      Fall Risk Screening: In the past year, patient has experienced: no history of falling in past year      Home Safety:  Patient does not have trouble with stairs inside or outside of their home  Patient has working smoke alarms and has working carbon monoxide detector  Home safety hazards include: none  Nutrition:   Current diet is Regular  Medications:   Patient is currently taking over-the-counter supplements  OTC medications include: see medication list  Patient is able to manage medications       Activities of Daily Living (ADLs)/Instrumental Activities of Daily Living (IADLs):   Walk and transfer into and out of bed and chair?: Yes  Dress and groom yourself?: Yes    Bathe or shower yourself?: Yes    Feed yourself? Yes  Do your laundry/housekeeping?: Yes  Manage your money, pay your bills and track your expenses?: Yes  Make your own meals?: Yes    Do your own shopping?: Yes    Previous Hospitalizations:   Any hospitalizations or ED visits within the last 12 months?: No      Advance Care Planning:   Living will: No    Durable POA for healthcare: No    End of Life Decisions reviewed with patient: No      Cognitive Screening:   Provider or family/friend/caregiver concerned regarding cognition?: No    PREVENTIVE SCREENINGS      Cardiovascular Screening:    General: Risks and Benefits Discussed      Diabetes Screening:     General: Screening Current      Colorectal Cancer Screening:     General: Risks and Benefits Discussed      Prostate Cancer Screening:    General: Risks and Benefits Discussed      Osteoporosis Screening:    General: Screening Not Indicated      Abdominal Aortic Aneurysm (AAA) Screening:    Risk factors include: age between 73-67 yo and tobacco use        Lung Cancer Screening:     General: Screening Not Indicated      Hepatitis C Screening:    General: Risks and Benefits Discussed    Hep C Screening Accepted: Yes      Screening, Brief Intervention, and Referral to Treatment (SBIRT)    Screening  Typical number of drinks in a day: 0  Typical number of drinks in a week: 0  Interpretation: Low risk drinking behavior  Single Item Drug Screening:  How often have you used an illegal drug (including marijuana) or a prescription medication for non-medical reasons in the past year? never    Single Item Drug Screen Score: 0  Interpretation: Negative screen for possible drug use disorder    Other Counseling Topics:   Car/seat belt/driving safety and regular weightbearing exercise         Annamaria Herrera,

## 2022-03-31 NOTE — ED NOTES
Patient post void bladder scan resulted as 940 mL patient went to the bathroom again and urinated 200 mL  Patient 2nd post void was >999 mL  Dr Blanca Wall aware  Per Dr Coy Body insert urinary catheter       Alina Sutherland RN  03/31/22 [id number]

## 2022-03-31 NOTE — PATIENT INSTRUCTIONS
I recommend you go to the ER at [facility] for the urologic problem we discussed today, as seen on the CAT scan  There is a very good urology group located there who may need to see you  Medicare Preventive Visit Patient Instructions  Thank you for completing your Welcome to Medicare Visit or Medicare Annual Wellness Visit today  Your next wellness visit will be due in one year (4/1/2023)  The screening/preventive services that you may require over the next 5-10 years are detailed below  Some tests may not apply to you based off risk factors and/or age  Screening tests ordered at today's visit but not completed yet may show as past due  Also, please note that scanned in results may not display below  Preventive Screenings:  Service Recommendations Previous Testing/Comments   Colorectal Cancer Screening  · Colonoscopy    · Fecal Occult Blood Test (FOBT)/Fecal Immunochemical Test (FIT)  · Fecal DNA/Cologuard Test  · Flexible Sigmoidoscopy Age: 54-65 years old   Colonoscopy: every 10 years (May be performed more frequently if at higher risk)  OR  FOBT/FIT: every 1 year  OR  Cologuard: every 3 years  OR  Sigmoidoscopy: every 5 years  Screening may be recommended earlier than age 48 if at higher risk for colorectal cancer  Also, an individualized decision between you and your healthcare provider will decide whether screening between the ages of 74-80 would be appropriate   Colonoscopy: Not on file  FOBT/FIT: Not on file  Cologuard: Not on file  Sigmoidoscopy: Not on file    Risks and Benefits Discussed     Prostate Cancer Screening Individualized decision between patient and health care provider in men between ages of 53-78   Medicare will cover every 12 months beginning on the day after your 50th birthday PSA: No results in last 5 years     Risks and Benefits Discussed     Hepatitis C Screening Once for adults born between [facility]  More frequently in patients at high risk for Hepatitis C Hep C Antibody: Not on file    Risks and Benefits Discussed  Due for Hepatitis C screening   Diabetes Screening 1-2 times per year if you're at risk for diabetes or have pre-diabetes Fasting glucose: No results in last 5 years   A1C: No results in last 5 years    Screening Current   Cholesterol Screening Once every 5 years if you don't have a lipid disorder  May order more often based on risk factors  Lipid panel: Not on file    Risks and Benefits Discussed      Other Preventive Screenings Covered by Medicare:  1  Abdominal Aortic Aneurysm (AAA) Screening: covered once if your at risk  You're considered to be at risk if you have a family history of AAA or a male between the age of 73-68 who smoking at least 100 cigarettes in your lifetime  2  Lung Cancer Screening: covers low dose CT scan once per year if you meet all of the following conditions: (1) Age 50-69; (2) No signs or symptoms of lung cancer; (3) Current smoker or have quit smoking within the last 15 years; (4) You have a tobacco smoking history of at least 30 pack years (packs per day x number of years you smoked); (5) You get a written order from a healthcare provider  3  Glaucoma Screening: covered annually if you're considered high risk: (1) You have diabetes OR (2) Family history of glaucoma OR (3)  aged 48 and older OR (3)  American aged 72 and older  3  Osteoporosis Screening: covered every 2 years if you meet one of the following conditions: (1) Have a vertebral abnormality; (2) On glucocorticoid therapy for more than 3 months; (3) Have primary hyperparathyroidism; (4) On osteoporosis medications and need to assess response to drug therapy  5  HIV Screening: covered annually if you're between the age of 12-76  Also covered annually if you are younger than 13 and older than 72 with risk factors for HIV infection  For pregnant patients, it is covered up to 3 times per pregnancy      Immunizations:  Immunization Recommendations   Influenza Vaccine Annual influenza vaccination during flu season is recommended for all persons aged >= 6 months who do not have contraindications   Pneumococcal Vaccine (Prevnar and Pneumovax)  * Prevnar = PCV13  * Pneumovax = PPSV23 Adults 25-60 years old: 1-3 doses may be recommended based on certain risk factors  Adults 72 years old: Prevnar (PCV13) vaccine recommended followed by Pneumovax (PPSV23) vaccine  If already received PPSV23 since turning 65, then PCV13 recommended at least one year after PPSV23 dose  Hepatitis B Vaccine 3 dose series if at intermediate or high risk (ex: diabetes, end stage renal disease, liver disease)   Tetanus (Td) Vaccine - COST NOT COVERED BY MEDICARE PART B Following completion of primary series, a booster dose should be given every 10 years to maintain immunity against tetanus  Td may also be given as tetanus wound prophylaxis  Tdap Vaccine - COST NOT COVERED BY MEDICARE PART B Recommended at least once for all adults  For pregnant patients, recommended with each pregnancy  Shingles Vaccine (Shingrix) - COST NOT COVERED BY MEDICARE PART B  2 shot series recommended in those aged 48 and above     Health Maintenance Due:      Topic Date Due    Hepatitis C Screening  Never done    Colorectal Cancer Screening  Never done     Immunizations Due:      Topic Date Due    COVID-19 Vaccine (3 - Booster for Pfizer series) 09/24/2021    Pneumococcal Vaccine: 65+ Years (2 of 2 - PPSV23) 01/29/2022     Advance Directives   What are advance directives? Advance directives are legal documents that state your wishes and plans for medical care  These plans are made ahead of time in case you lose your ability to make decisions for yourself  Advance directives can apply to any medical decision, such as the treatments you want, and if you want to donate organs  What are the types of advance directives? There are many types of advance directives, and each state has rules about how to use them   You may choose a combination of any of the following:  · Living will: This is a written record of the treatment you want  You can also choose which treatments you do not want, which to limit, and which to stop at a certain time  This includes surgery, medicine, IV fluid, and tube feedings  · Durable power of  for healthcare [facility]): This is a written record that states who you want to make healthcare choices for you when you are unable to make them for yourself  This person, called a proxy, is usually a family member or a friend  You may choose more than 1 proxy  · Do not resuscitate (DNR) order:  A DNR order is used in case your heart stops beating or you stop breathing  It is a request not to have certain forms of treatment, such as CPR  A DNR order may be included in other types of advance directives  · Medical directive: This covers the care that you want if you are in a coma, near death, or unable to make decisions for yourself  You can list the treatments you want for each condition  Treatment may include pain medicine, surgery, blood transfusions, dialysis, IV or tube feedings, and a ventilator (breathing machine)  · Values history: This document has questions about your views, beliefs, and how you feel and think about life  This information can help others choose the care that you would choose  Why are advance directives important? An advance directive helps you control your care  Although spoken wishes may be used, it is better to have your wishes written down  Spoken wishes can be misunderstood, or not followed  Treatments may be given even if you do not want them  An advance directive may make it easier for your family to make difficult choices about your care  Weight Management   Why it is important to manage your weight:  Being overweight increases your risk of health conditions such as heart disease, high blood pressure, type 2 diabetes, and certain types of cancer   It can also increase your risk for osteoarthritis, sleep apnea, and other respiratory problems  Aim for a slow, steady weight loss  Even a small amount of weight loss can lower your risk of health problems  How to lose weight safely:  A safe and healthy way to lose weight is to eat fewer calories and get regular exercise  You can lose up about 1 pound a week by decreasing the number of calories you eat by 500 calories each day  Healthy meal plan for weight management:  A healthy meal plan includes a variety of foods, contains fewer calories, and helps you stay healthy  A healthy meal plan includes the following:  · Eat whole-grain foods more often  A healthy meal plan should contain fiber  Fiber is the part of grains, fruits, and vegetables that is not broken down by your body  Whole-grain foods are healthy and provide extra fiber in your diet  Some examples of whole-grain foods are whole-wheat breads and pastas, oatmeal, brown rice, and bulgur  · Eat a variety of vegetables every day  Include dark, leafy greens such as spinach, kale, cris greens, and mustard greens  Eat yellow and orange vegetables such as carrots, sweet potatoes, and winter squash  · Eat a variety of fruits every day  Choose fresh or canned fruit (canned in its own juice or light syrup) instead of juice  Fruit juice has very little or no fiber  · Eat low-fat dairy foods  Drink fat-free (skim) milk or 1% milk  Eat fat-free yogurt and low-fat cottage cheese  Try low-fat cheeses such as mozzarella and other reduced-fat cheeses  · Choose meat and other protein foods that are low in fat  Choose beans or other legumes such as split peas or lentils  Choose fish, skinless poultry (chicken or turkey), or lean cuts of red meat (beef or pork)  Before you cook meat or poultry, cut off any visible fat  · Use less fat and oil  Try baking foods instead of frying them   Add less fat, such as margarine, sour cream, regular salad dressing and mayonnaise to foods  Eat fewer high-fat foods  Some examples of high-fat foods include french fries, doughnuts, ice cream, and cakes  · Eat fewer sweets  Limit foods and drinks that are high in sugar  This includes candy, cookies, regular soda, and sweetened drinks  Exercise:  Exercise at least 30 minutes per day on most days of the week  Some examples of exercise include walking, biking, dancing, and swimming  You can also fit in more physical activity by taking the stairs instead of the elevator or parking farther away from stores  Ask your healthcare provider about the best exercise plan for you  © Copyright [employer] 2018 Information is for End User's use only and may not be sold, redistributed or otherwise used for commercial purposes   All illustrations and images included in CareNotes® are the copyrighted property of A D A M , Inc  or [address] [place], [place] [address]pa[facility]

## 2022-03-31 NOTE — PROGRESS NOTES
Assessment/Plan:    No problem-specific Assessment & Plan notes found for this encounter  CT findings discussed  Advised of urgent urologic eval  Agreeable to ER eval at [facility]  May need urologist there  Creatinine status? bp noted  Needs f/u soon to decide on HTN meds  ua w/o infection or sign of prostatitis    Agreeable with plan  I called [facility] ER in advance for him and left him a message that I did  S/w pt at 6:40pm while he was admitted at [facility]  High PVR 1L  Has nathan  Pt states he is comfortable but nervous  bp coming down per pt  Has not seen urologist yet  Is able to eat and has meal ordered     Diagnoses and all orders for this visit:    Medicare annual wellness visit, subsequent    Colon cancer screening  -     Ambulatory referral for colonoscopy; Future    Abdominal pain, unspecified abdominal location  -     POCT urine dip    Screening for cardiovascular, respiratory, and genitourinary diseases  -     Lipid Panel with Direct LDL reflex; Future    Screening for diabetes mellitus (DM)  -     Comprehensive metabolic panel; Future    Prostate cancer screening  -     PSA, Total Screen; Future    Hydroureteronephrosis    Need for hepatitis C screening test  -     Hepatitis C antibody; Future    Urinary (tract) obstruction        Return in about 1 month (around 4/30/2022) for Recheck  Subjective:      Patient ID: Jeanna Toure is a 74 y o  male  Chief Complaint   Patient presents with    Follow-up     on CT scan, [id number]     Medicare Wellness Visit       HPI  Here for f/u  Son says he seems bigger in abdomen but hard to say  Slow flow but no worse than usual  Abdomen about same per pt  No wt changes noted  No prior urologist except distantly saw Dr Julee Boxer  No fever    Diet followed on/off, nothing consistent  Wt and bmi aware  Not exercising  Dinks soda once a day, 1/2 glass, ginger ale  Carb watching? Sort of      The following portions of the patient's history were reviewed and updated as appropriate: allergies, current medications, past family history, past medical history, past social history, past surgical history and problem list     Review of Systems   Constitutional: Negative for chills and fever  Genitourinary: Negative for dysuria  No current facility-administered medications for this visit  No current outpatient medications on file  Facility-Administered Medications Ordered in Other Visits   Medication Dose Route Frequency Provider Last Rate Last Admin    [START ON 4/1/2022] enoxaparin (LOVENOX) subcutaneous injection 40 mg  40 mg Subcutaneous Daily Kaylee Capps DO        metoprolol (LOPRESSOR) injection 5 mg  5 mg Intravenous Q8H PRN Kaylee Capps DO        [START ON 4/1/2022] polyethylene glycol (MIRALAX) packet 17 g  17 g Oral Daily Kaylee Capps DO        senna-docusate sodium (SENOKOT S) 8 6-50 mg per tablet 1 tablet  1 tablet Oral HS Kaylee Capps DO        tamsulosin (FLOMAX) capsule 0 4 mg  0 4 mg Oral Daily With TXU López, DO   0 4 mg at 03/31/22 1836       Objective:    /90   Pulse 87   Temp 97 9 °F (36 6 °C)   Resp 20   Ht 5' 8" (1 727 m)   Wt 114 kg (252 lb)   SpO2 99%   BMI 38 32 kg/m²        Physical Exam  Vitals and nursing note reviewed  Constitutional:       Appearance: He is well-developed  He is obese  He is not ill-appearing  HENT:      Head: Normocephalic  Right Ear: Tympanic membrane normal       Left Ear: Tympanic membrane normal    Eyes:      General: No scleral icterus  Conjunctiva/sclera: Conjunctivae normal    Cardiovascular:      Rate and Rhythm: Normal rate and regular rhythm  Heart sounds: No murmur heard  Pulmonary:      Effort: Pulmonary effort is normal  No respiratory distress  Breath sounds: No wheezing or rales  Abdominal:      General: Bowel sounds are normal  There is distension  Palpations: Abdomen is soft  Tenderness:  There is no abdominal tenderness  There is no right CVA tenderness, left CVA tenderness, guarding or rebound  Musculoskeletal:         General: No deformity  Cervical back: Neck supple  Right lower leg: No edema  Left lower leg: No edema  Skin:     General: Skin is warm and dry  Coloration: Skin is not pale  Neurological:      General: No focal deficit present  Mental Status: He is alert  Motor: No weakness  Gait: Gait normal    Psychiatric:         Mood and Affect: Mood normal          Behavior: Behavior normal          Thought Content: Thought content normal        BMI Counseling: Body mass index is 38 32 kg/m²  The BMI is above normal  Nutrition recommendations include decreasing portion sizes and moderation in carbohydrate intake  Exercise recommendations include exercising 3-5 times per week  No pharmacotherapy was ordered  Rationale for BMI follow-up plan is due to patient being overweight or obese                Anson Feliz DO

## 2022-03-31 NOTE — ASSESSMENT & PLAN NOTE
Patient with abdominal pain x2 weeks, initially started with gastrointestinal upset/diarrhea  He and son were sick and presented to PCP  On physical exam, abdomen was more distended compared to baseline  Sent patient for outpatient CT a/p, results outlined below  Patient with history of BPH diagnosed > 10 years ago, patient has not taken medications for BPH or follow with urology  CT a/p 03/25: Moderate to severe bilateral hydroureteronephrosis as well as a distended bladder exhibiting an apex diverticulum all suggesting chronic bladder outlet obstruction related to prostatomegaly  Recommend prompt urologic consultation  Additionally, there is some irregular left mid to upper renal pole cortical enhancement evident for which infection is of concern and correlation with urinalysis is advised  Patient had abdominal pain and distention of abdomen on presentation  PVR was assessed and was 940 mL, patient urinated 220 mL, repeat PVR was > 999 mL  Nathan catheter was inserted in the ED  Patient with mild hematuria after nathan insertion  Plan:  · Maintain Nathan catheter; urinary retention protocol  · Urology consulted; appreciate recommendations  · Start tamsulosin 0 4 mg QDinner  · Daily weights and Is&Os  · Irrigate nathan catheter as needed for red-tinged/hematuria of urine in bag  Monitor for clots in bag

## 2022-03-31 NOTE — ED PROVIDER NOTES
History  Chief Complaint   Patient presents with    Evaluation of Abnormal Diagnostic Test     Patient had CT scan on 3/25/22  Send in for abnormal findings- bilateral hydroureteronephrosis  Pt denies any abdominal pain currently  Pt reports that he does feel that intermittently his bladder does not empty fully  History provided by:  Patient   used: No    Evaluation of Abnormal Diagnostic Test      Patient is a 67-year-old male presenting to emergency department with CT scan done as outpatient showing bilateral hydro nephrosis  Had some lower abdominal discomfort when scan was done  Feels some pressure  No fevers  No chills  No nausea vomiting  States has to urinate frequently, no pain or burning  MDM will get baseline labs, check kidney function, CBC  Bladder scan    Bladder scan over 1000  Will place Reyes  Creatinine is 2  No baseline noted  Will admit for YANELIS likely from obstruction    Prior to Admission Medications   Prescriptions Last Dose Informant Patient Reported? Taking? Multiple Vitamin (MULTIVITAMINS PO)   Yes No   Sig: Take by mouth Daily      Facility-Administered Medications: None       Past Medical History:   Diagnosis Date    Abnormal weight gain 06/12/2008    LAST ASSESSED: 6/12/08    Anal fissure 03/05/2010    LAST ASSESSED: 3/5/10       History reviewed  No pertinent surgical history  Family History   Problem Relation Age of Onset    Diabetes Mother         MELLITUS    Hypertension Mother      I have reviewed and agree with the history as documented  E-Cigarette/Vaping     E-Cigarette/Vaping Substances     Social History     Tobacco Use    Smoking status: Former Smoker    Smokeless tobacco: Never Used   Substance Use Topics    Alcohol use: Never    Drug use: Never       Review of Systems   Constitutional: Negative for chills, diaphoresis and fever  HENT: Negative for congestion and sore throat      Respiratory: Negative for cough, shortness of breath, wheezing and stridor  Cardiovascular: Negative for chest pain, palpitations and leg swelling  Gastrointestinal: Negative for abdominal pain, blood in stool, diarrhea, nausea and vomiting  Genitourinary: Positive for difficulty urinating and frequency  Negative for dysuria and urgency  Musculoskeletal: Negative for neck pain and neck stiffness  Skin: Negative for pallor and rash  Neurological: Negative for dizziness, syncope, weakness, light-headedness and headaches  All other systems reviewed and are negative  Physical Exam  Physical Exam  Vitals reviewed  Constitutional:       Appearance: Normal appearance  He is well-developed  HENT:      Head: Normocephalic and atraumatic  Eyes:      Extraocular Movements: Extraocular movements intact  Pupils: Pupils are equal, round, and reactive to light  Cardiovascular:      Rate and Rhythm: Normal rate and regular rhythm  Heart sounds: Normal heart sounds  Pulmonary:      Effort: Pulmonary effort is normal  No respiratory distress  Breath sounds: Normal breath sounds  Abdominal:      General: Bowel sounds are normal       Palpations: Abdomen is soft  Tenderness: There is no abdominal tenderness  Musculoskeletal:         General: No swelling or tenderness  Normal range of motion  Cervical back: Normal range of motion and neck supple  Skin:     General: Skin is warm and dry  Capillary Refill: Capillary refill takes less than 2 seconds  Neurological:      General: No focal deficit present  Mental Status: He is alert and oriented to person, place, and time           Vital Signs  ED Triage Vitals   Temperature Pulse Respirations Blood Pressure SpO2   03/31/22 1313 03/31/22 1313 03/31/22 1313 03/31/22 1313 03/31/22 1313   98 4 °F (36 9 °C) 98 16 (!) 216/105 97 %      Temp Source Heart Rate Source Patient Position - Orthostatic VS BP Location FiO2 (%)   03/31/22 1313 03/31/22 1534 03/31/22 1534 03/31/22 1534 --   Oral Monitor Lying Left arm       Pain Score       03/31/22 1313       No Pain           Vitals:    03/31/22 1313 03/31/22 1534 03/31/22 1600 03/31/22 1630   BP: (!) 216/105 (!) 199/84 (!) 194/90 (!) 179/84   Pulse: 98 85 84 78   Patient Position - Orthostatic VS:  Lying Lying Lying         Visual Acuity      ED Medications  Medications - No data to display    Diagnostic Studies  Results Reviewed     Procedure Component Value Units Date/Time    Urine Macroscopic, POC [[id number]] Collected: 03/31/22 1459    Lab Status: Final result Specimen: Urine Updated: 03/31/22 1501     Color, UA Yellow     Clarity, UA Clear     pH, UA 5 0     Leukocytes, UA Negative     Nitrite, UA Negative     Protein, UA Negative mg/dl      Glucose, UA Negative mg/dl      Ketones, UA Negative mg/dl      Urobilinogen, UA 0 2 E U /dl      Bilirubin, UA Negative     Blood, UA Negative     Specific Gravity, UA 1 015    Narrative:      CLINITEK RESULT    Comprehensive metabolic panel [[id number]]  (Abnormal) Collected: 03/31/22 1402    Lab Status: Final result Specimen: Blood from Arm, Right Updated: 03/31/22 1441     Sodium 139 mmol/L      Potassium 3 6 mmol/L      Chloride 105 mmol/L      CO2 25 mmol/L      ANION GAP 9 mmol/L      BUN 34 mg/dL      Creatinine 2 06 mg/dL      Glucose 123 mg/dL      Calcium 9 3 mg/dL      AST 18 U/L      ALT 22 U/L      Alkaline Phosphatase 71 U/L      Total Protein 7 7 g/dL      Albumin 3 7 g/dL      Total Bilirubin 0 97 mg/dL      eGFR 32 ml/min/1 73sq m     Narrative:      [place] guidelines for Chronic Kidney Disease (CKD):     Stage 1 with normal or high GFR (GFR > 90 mL/min/1 73 square meters)    Stage 2 Mild CKD (GFR = 60-89 mL/min/1 73 square meters)    Stage 3A Moderate CKD (GFR = 45-59 mL/min/1 73 square meters)    Stage 3B Moderate CKD (GFR = 30-44 mL/min/1 73 square meters)    Stage 4 Severe CKD (GFR = 15-29 mL/min/1 73 square meters)    Stage 5 End Stage CKD (GFR <15 mL/min/1 73 square meters)  Note: GFR calculation is accurate only with a steady state creatinine    CBC and differential [[id number]] Collected: 03/31/22 1402    Lab Status: Final result Specimen: Blood from Arm, Right Updated: 03/31/22 1421     WBC 8 71 Thousand/uL      RBC 4 52 Million/uL      Hemoglobin 13 4 g/dL      Hematocrit 39 9 %      MCV 88 fL      MCH 29 6 pg      MCHC 33 6 g/dL      RDW 12 9 %      MPV 9 8 fL      Platelets 977 Thousands/uL      nRBC 0 /100 WBCs      Neutrophils Relative 75 %      Immat GRANS % 1 %      Lymphocytes Relative 17 %      Monocytes Relative 6 %      Eosinophils Relative 1 %      Basophils Relative 0 %      Neutrophils Absolute 6 50 Thousands/µL      Immature Grans Absolute 0 04 Thousand/uL      Lymphocytes Absolute 1 50 Thousands/µL      Monocytes Absolute 0 55 Thousand/µL      Eosinophils Absolute 0 09 Thousand/µL      Basophils Absolute 0 03 Thousands/µL                  No orders to display              Procedures  Procedures         ED Course                                             MDM    Disposition  Final diagnoses:   YANELIS (acute kidney injury) ([facility] )   Hydronephrosis   Urinary obstruction     Time reflects when diagnosis was documented in both MDM as applicable and the Disposition within this note     Time User Action Codes Description Comment    3/31/2022  4:16 PM Tadevosyan, Palmira Add [N17 9] YANELIS (acute kidney injury) ([facility] [address] [facility] )     3/31/2022  4:16 PM Tadevosyan, Palmira Add [N13 30] Hydronephrosis     3/31/2022  4:16 PM Tadevosyan, Palmira Add [N13 9] Urinary obstruction       ED Disposition     ED Disposition Condition Date/Time Comment    Admit Stable u Mar 31, 2022  4:15 PM Case was discussed with Dr Radha Wiley and the patient's admission status was agreed to be Admission Status: inpatient status to the service of Dr Radha Wiley           Follow-up Information    None         Patient's Medications   Discharge Prescriptions    No medications on file       No discharge procedures on file      PDMP Review     None          ED Provider  Electronically Signed by           Ricarda Baumann MD  03/31/22 8925

## 2022-03-31 NOTE — ED NOTES
Made provider Jefry aware that there is blood in patient nathan  Vital signs unchanged  Provider aware  Per provider okay for patient to be transport to his room and she will come to bedside to assess further       Chantale Barton RN  03/31/22 8485

## 2022-03-31 NOTE — ASSESSMENT & PLAN NOTE
Recent Labs     03/31/22  1402   CREATININE 2 06*   EGFR 32     Estimated Creatinine Clearance: 42 7 mL/min (A) (by C-G formula based on SCr of 2 06 mg/dL (H))   POA  Unknown baseline  Cannot determine if YANELIS without baseline   UA: negative   Imaging: CT a/p completed outpatient - Moderate to severe bilateral hydroureteronephrosis as well as a distended bladder exhibiting an apex diverticulum all suggesting chronic bladder outlet obstruction related to prostatomegaly  Recommend prompt urologic consultation  Additionally, there is some irregular left mid to upper renal pole cortical enhancement evident for which infection is of concern and correlation with urinalysis is advised     Likely post renal due to urinary obstruction secondary to prostatomegaly, possible pre renal with recent gastrointestinal illness with diarrhea     Plan:   Maintain Reyes catheter   o Urology consulted; appreciate recommendations   UA negative, patient afebrile, no indication for urine culture   Urinary retention protocol, Bladder scan, Daily weights, I/O   Monitor BMP daily and observe for downward trend of creatinine   Avoid hypoperfusion of the kidneys, minimize nephrotoxins   Encourage PO intake

## 2022-04-01 PROBLEM — E87.6 HYPOKALEMIA: Status: ACTIVE | Noted: 2022-04-01

## 2022-04-01 LAB
ALBUMIN SERPL BCP-MCNC: 3.5 G/DL (ref 3.5–5)
ALP SERPL-CCNC: 73 U/L (ref 46–116)
ALT SERPL W P-5'-P-CCNC: 19 U/L (ref 12–78)
ANION GAP SERPL CALCULATED.3IONS-SCNC: 11 MMOL/L (ref 4–13)
AST SERPL W P-5'-P-CCNC: 13 U/L (ref 5–45)
BILIRUB SERPL-MCNC: 1.51 MG/DL (ref 0.2–1)
BUN SERPL-MCNC: 28 MG/DL (ref 5–25)
CALCIUM SERPL-MCNC: 9.4 MG/DL (ref 8.3–10.1)
CHLORIDE SERPL-SCNC: 105 MMOL/L (ref 100–108)
CO2 SERPL-SCNC: 26 MMOL/L (ref 21–32)
CREAT SERPL-MCNC: 1.64 MG/DL (ref 0.6–1.3)
ERYTHROCYTE [DISTWIDTH] IN BLOOD BY AUTOMATED COUNT: 12.9 % (ref 11.6–15.1)
GFR SERPL CREATININE-BSD FRML MDRD: 42 ML/MIN/1.73SQ M
GLUCOSE SERPL-MCNC: 115 MG/DL (ref 65–140)
HCT VFR BLD AUTO: 42.3 % (ref 36.5–49.3)
HGB BLD-MCNC: 14.1 G/DL (ref 12–17)
MCH RBC QN AUTO: 29.7 PG (ref 26.8–34.3)
MCHC RBC AUTO-ENTMCNC: 33.3 G/DL (ref 31.4–37.4)
MCV RBC AUTO: 89 FL (ref 82–98)
PLATELET # BLD AUTO: 214 THOUSANDS/UL (ref 149–390)
PMV BLD AUTO: 10.3 FL (ref 8.9–12.7)
POTASSIUM SERPL-SCNC: 3.4 MMOL/L (ref 3.5–5.3)
PROT SERPL-MCNC: 7.6 G/DL (ref 6.4–8.2)
RBC # BLD AUTO: 4.75 MILLION/UL (ref 3.88–5.62)
SODIUM SERPL-SCNC: 142 MMOL/L (ref 136–145)
WBC # BLD AUTO: 11.17 THOUSAND/UL (ref 4.31–10.16)

## 2022-04-01 PROCEDURE — 99232 SBSQ HOSP IP/OBS MODERATE 35: CPT | Performed by: INTERNAL MEDICINE

## 2022-04-01 PROCEDURE — 80053 COMPREHEN METABOLIC PANEL: CPT

## 2022-04-01 PROCEDURE — 99232 SBSQ HOSP IP/OBS MODERATE 35: CPT | Performed by: NURSE PRACTITIONER

## 2022-04-01 PROCEDURE — 87086 URINE CULTURE/COLONY COUNT: CPT

## 2022-04-01 PROCEDURE — 85027 COMPLETE CBC AUTOMATED: CPT

## 2022-04-01 RX ORDER — SODIUM CHLORIDE 450 MG/100ML
75 INJECTION, SOLUTION INTRAVENOUS CONTINUOUS
Status: DISCONTINUED | OUTPATIENT
Start: 2022-04-01 | End: 2022-04-01

## 2022-04-01 RX ORDER — SODIUM CHLORIDE 450 MG/100ML
75 INJECTION, SOLUTION INTRAVENOUS CONTINUOUS
Status: DISPENSED | OUTPATIENT
Start: 2022-04-01 | End: 2022-04-01

## 2022-04-01 RX ORDER — AMLODIPINE BESYLATE 5 MG/1
5 TABLET ORAL DAILY
Status: DISCONTINUED | OUTPATIENT
Start: 2022-04-01 | End: 2022-04-03 | Stop reason: HOSPADM

## 2022-04-01 RX ORDER — POTASSIUM CHLORIDE 20 MEQ/1
20 TABLET, EXTENDED RELEASE ORAL ONCE
Status: COMPLETED | OUTPATIENT
Start: 2022-04-01 | End: 2022-04-01

## 2022-04-01 RX ORDER — LABETALOL 20 MG/4 ML (5 MG/ML) INTRAVENOUS SYRINGE
10 EVERY 6 HOURS PRN
Status: DISCONTINUED | OUTPATIENT
Start: 2022-04-01 | End: 2022-04-03 | Stop reason: HOSPADM

## 2022-04-01 RX ADMIN — SENNOSIDES AND DOCUSATE SODIUM 1 TABLET: 50; 8.6 TABLET ORAL at 21:30

## 2022-04-01 RX ADMIN — POLYETHYLENE GLYCOL 3350 17 G: 17 POWDER, FOR SOLUTION ORAL at 08:11

## 2022-04-01 RX ADMIN — SODIUM CHLORIDE 75 ML/HR: 0.45 INJECTION, SOLUTION INTRAVENOUS at 08:12

## 2022-04-01 RX ADMIN — AMLODIPINE BESYLATE 5 MG: 5 TABLET ORAL at 10:01

## 2022-04-01 RX ADMIN — METOPROLOL TARTRATE 5 MG: 5 INJECTION INTRAVENOUS at 08:26

## 2022-04-01 RX ADMIN — SODIUM CHLORIDE, SODIUM GLUCONATE, SODIUM ACETATE, POTASSIUM CHLORIDE, MAGNESIUM CHLORIDE, SODIUM PHOSPHATE, DIBASIC, AND POTASSIUM PHOSPHATE 100 ML/HR: .53; .5; .37; .037; .03; .012; .00082 INJECTION, SOLUTION INTRAVENOUS at 00:09

## 2022-04-01 RX ADMIN — ENOXAPARIN SODIUM 40 MG: 40 INJECTION SUBCUTANEOUS at 08:11

## 2022-04-01 RX ADMIN — TAMSULOSIN HYDROCHLORIDE 0.4 MG: 0.4 CAPSULE ORAL at 15:47

## 2022-04-01 RX ADMIN — POTASSIUM CHLORIDE 20 MEQ: 1500 TABLET, EXTENDED RELEASE ORAL at 08:11

## 2022-04-01 NOTE — PLAN OF CARE
Problem: PAIN - ADULT  Goal: Verbalizes/displays adequate comfort level or baseline comfort level  Description: Interventions:  - Encourage patient to monitor pain and request assistance  - Assess pain using appropriate pain scale  - Administer analgesics based on type and severity of pain and evaluate response  - Implement non-pharmacological measures as appropriate and evaluate response  - Consider cultural and social influences on pain and pain management  - Notify physician/advanced practitioner if interventions unsuccessful or patient reports new pain  Outcome: Progressing     Problem: INFECTION - ADULT  Goal: Absence or prevention of progression during hospitalization  Description: INTERVENTIONS:  - Assess and monitor for signs and symptoms of infection  - Monitor lab/diagnostic results  - Monitor all insertion sites, i e  indwelling lines, tubes, and drains  - Monitor endotracheal if appropriate and nasal secretions for changes in amount and color  - [place] appropriate cooling/warming therapies per order  - Administer medications as ordered  - Instruct and encourage patient and family to use good hand hygiene technique  - Identify and instruct in appropriate isolation precautions for identified infection/condition  Outcome: Progressing     Problem: DISCHARGE PLANNING  Goal: Discharge to home or other facility with appropriate resources  Description: INTERVENTIONS:  - Identify barriers to discharge w/patient and caregiver  - Arrange for needed discharge resources and transportation as appropriate  - Identify discharge learning needs (meds, wound care, etc )  - Arrange for interpretive services to assist at discharge as needed  - Refer to Case Management Department for coordinating discharge planning if the patient needs post-hospital services based on physician/advanced practitioner order or complex needs related to functional status, cognitive ability, or social support system  Outcome: Progressing Problem: Knowledge Deficit  Goal: Patient/family/caregiver demonstrates understanding of disease process, treatment plan, medications, and discharge instructions  Description: Complete learning assessment and assess knowledge base    Interventions:  - Provide teaching at level of understanding  - Provide teaching via preferred learning methods  Outcome: Progressing

## 2022-04-01 NOTE — ASSESSMENT & PLAN NOTE
Patient with abdominal pain x2 weeks, initially started with gastrointestinal upset/diarrhea  He and son were sick and presented to PCP  On physical exam, abdomen was more distended compared to baseline  Sent patient for outpatient CT a/p, results outlined below  Patient with history of BPH diagnosed > 10 years ago, patient has not taken medications for BPH or follow with urology  CT a/p 03/25: Moderate to severe bilateral hydroureteronephrosis as well as a distended bladder exhibiting an apex diverticulum all suggesting chronic bladder outlet obstruction related to prostatomegaly  Recommend prompt urologic consultation  Additionally, there is some irregular left mid to upper renal pole cortical enhancement evident for which infection is of concern and correlation with urinalysis is advised  Patient had abdominal pain and distention of abdomen on presentation  PVR was assessed and was 940 mL, patient urinated 220 mL, repeat PVR was > 999 mL  Nathan catheter was inserted in the ED  Patient with mild hematuria after nathan insertion  Plan:  · Maintain Nathan catheter; urinary retention protocol  · Patient with mild leukocytosis today, continue to monitor and follow pending urine culture  · Urology consulted; appreciate recommendations  · Recommend urine culture  · Repeat ultrasound in 48 hours to monitor hydronephrosis  · Follow up outpatient for trial of void and full BPH work up with potential cystoscopy/TRUS  · Continue tamsulosin 0 4 mg QDinner  · Daily weights and Is&Os  · Irrigate nathan catheter as needed for red-tinged/hematuria of urine in bag QID  Monitor for clots in bag

## 2022-04-01 NOTE — H&P
[address] 1955, 74 y o  male MRN: [id number]  Unit/Bed#: W -01 Encounter: [id number]  Primary Care Provider: Kateryna Castellanos DO   Date and time admitted to hospital: 3/31/2022  1:33 PM    * Urinary (tract) obstruction  Assessment & Plan  Patient with abdominal pain x2 weeks, initially started with gastrointestinal upset/diarrhea  He and son were sick and presented to PCP  On physical exam, abdomen was more distended compared to baseline  Sent patient for outpatient CT a/p, results outlined below  Patient with history of BPH diagnosed > 10 years ago, patient has not taken medications for BPH or follow with urology  CT a/p 03/25: Moderate to severe bilateral hydroureteronephrosis as well as a distended bladder exhibiting an apex diverticulum all suggesting chronic bladder outlet obstruction related to prostatomegaly  Recommend prompt urologic consultation  Additionally, there is some irregular left mid to upper renal pole cortical enhancement evident for which infection is of concern and correlation with urinalysis is advised  Patient had abdominal pain and distention of abdomen on presentation  PVR was assessed and was 940 mL, patient urinated 220 mL, repeat PVR was > 999 mL  Nathan catheter was inserted in the ED  Patient with mild hematuria after nathan insertion  Plan:  · Maintain Nathan catheter; urinary retention protocol  · Urology consulted; appreciate recommendations  · Start tamsulosin 0 4 mg QDinner  · Daily weights and Is&Os  · Irrigate nathan catheter as needed for red-tinged/hematuria of urine in bag  Monitor for clots in bag  Elevated serum creatinine  Assessment & Plan  Recent Labs     03/31/22  1402   CREATININE 2 06*   EGFR 32     Estimated Creatinine Clearance: 42 7 mL/min (A) (by C-G formula based on SCr of 2 06 mg/dL (H))   POA  Unknown baseline  Cannot determine if YANELIS without baseline     UA: negative   Imaging: CT a/p completed outpatient - Moderate to severe bilateral hydroureteronephrosis as well as a distended bladder exhibiting an apex diverticulum all suggesting chronic bladder outlet obstruction related to prostatomegaly  Recommend prompt urologic consultation  Additionally, there is some irregular left mid to upper renal pole cortical enhancement evident for which infection is of concern and correlation with urinalysis is advised   Likely post renal due to urinary obstruction secondary to prostatomegaly, possible pre renal with recent gastrointestinal illness with diarrhea     Plan:   Maintain Reyes catheter   o Urology consulted; appreciate recommendations   UA negative, patient afebrile, no indication for urine culture   Urinary retention protocol, Bladder scan, Daily weights, I/O   Monitor BMP daily and observe for downward trend of creatinine   Avoid hypoperfusion of the kidneys, minimize nephrotoxins   Encourage PO intake    Hydroureteronephrosis  Assessment & Plan  Evidence on CT a/p on 03/25/22  See urinary tract obstruction above  Hypertensive urgency  Assessment & Plan  Patient with BP of 216/105 mmHg on presentation likely secondary to pain and anxiety  Patient's pressure improved to 179/84 mmHg  Pressure increased again to 197/96 mmHg  No evidence of end organ damage  Plan:  · PRN IV Lopressor 5 mg q8h for SBP > 180 mmHg  · Consider adding maintenance antihypertensive if blood pressures persistently elevated  Patient reports intermittent hypertension during outpatient appointments being monitored by PCP  Varicose veins of both lower extremities  Assessment & Plan  Patient with history of varicose veins bilaterally  Patient has history of bleeding periodically if he hits against something or occasionally from showers  Management with pressure as needed and compression stockings daily      Plan:  · Continue compression stockings as patient wears at home  · Monitor for rupture/bleeding of veins    VTE Pharmacologic Prophylaxis: VTE Score: 4 Moderate Risk (Score 3-4) - Pharmacological DVT Prophylaxis Ordered: enoxaparin (Lovenox)  Code Status: Level 1 - Full Code   Discussion with family: Updated  (wife and son) at bedside  Anticipated Length of Stay: Patient will be admitted on an inpatient basis with an anticipated length of stay of greater than 2 midnights secondary to urinary tract obstruction  Chief Complaint: abdominal pain, urinary tract obstruction, hydroureteronephrosis    History of Present Illness:  Adam Donohue is a 74 y o  male with a PMH of BPH diagnosed > 10 years ago, history of epilepsy with last known seizure in his 25s - not currently on medication, varicose veins who presents with abdominal pain and distention after being sent to ED by primary care provider who sent patient due to CT abdomen and pelvis findings and physical exam     Patient visited his PCP due to diarrhea that was occurring for 2-3 days on 03/21, at the time his son was also experiencing GI upset  PCP noticed patient's abdomen with increased distention and palpated a mass in LLQ, patient also had abdominal pain and so was sent for CT A/P w/wo contrast  Report demonstrated moderate to severe bilateral hydroureteronephrosis as well as a distended bladder exhibiting an apex diverticulum all suggesting chronic bladder outlet obstruction related to prostatomegaly  Recommend prompt urologic consultation  Additionally, there   is some irregular left mid to upper renal pole cortical enhancement evident for which infection is of concern and correlation with urinalysis is advised  Patient returned to PCP today for evaluation and to review report  UA was negative in the office  Patient was sent to the ED for further evaluation  Patient reports he had continued abdominal pain and pressure on arrival to ED  In the ED, patient had hypertensive urgency   Initially SBP > 200, however, decreased without intervention when patient had relief of pain and improvement in anxiousness about arriving to hospital  On admission, patient's BP was 197/96 mmHg  No signs of end organ damage  Vital signs within normal limit  Cr was increased at 2 06 within unknown baseline  Other lab findings WNL  UA in the ED was negative  Patient PVR showed > 999 mL after urination  Reyes catheter was placed and patient had immediate relief of abdominal discomfort  Patient reports no worsening urinary symptoms, he reports he has been able to urinate and has urinated frequently for years, he has not noticed increased difficulty with urination, patient denied fever or chills  He denied lightheadedness or dizziness  Review of Systems:  Review of Systems   Constitutional: Negative for activity change, appetite change, chills, fatigue and fever  Respiratory: Negative for cough and shortness of breath  Cardiovascular: Negative for chest pain, palpitations and leg swelling  Gastrointestinal: Positive for abdominal pain  Negative for constipation, diarrhea, nausea and vomiting  Endocrine: Positive for polyuria  Genitourinary: Negative for dysuria, flank pain, frequency, hematuria, penile pain and urgency  Musculoskeletal: Positive for neck pain  Negative for back pain  Skin: Negative for rash  Neurological: Negative for dizziness, seizures (no recent, history of epilepsy), facial asymmetry, weakness, light-headedness, numbness and headaches  Psychiatric/Behavioral: Negative for behavioral problems and confusion  Past Medical and Surgical History:   Past Medical History:   Diagnosis Date    Abnormal weight gain 06/12/2008    LAST ASSESSED: 6/12/08    Anal fissure 03/05/2010    LAST ASSESSED: 3/5/10       History reviewed  No pertinent surgical history  Meds/Allergies:  Prior to Admission medications    Medication Sig Start Date End Date Taking?  Authorizing Provider   Multiple Vitamin (MULTIVITAMINS PO) Take by mouth Daily    Historical Provider, MD     I have reviewed home medications with patient personally  Allergies: No Known Allergies    Social History:  Marital Status: /Civil Union   Patient Pre-hospital Living Situation: Home, With spouse, With other family member: son  Patient Pre-hospital Level of Mobility: walks  Patient Pre-hospital Diet Restrictions: None; patient intermittently follows Keto diet   Substance Use History:   Social History     Substance and Sexual Activity   Alcohol Use On rare occasions, < 1 drink at one time     Social History     Tobacco Use   Smoking Status Former Smoker - quit 40+ years ago   Smokeless Tobacco Never Used     Social History     Substance and Sexual Activity   Drug Use Never       Family History:  Family History   Problem Relation Age of Onset    Diabetes Mother         MELLITUS    Hypertension Mother        Physical Exam:     Vitals:   Blood Pressure: 162/90 (03/31/22 1826)  Pulse: 78 (03/31/22 1825)  Temperature: 98 °F (36 7 °C) (03/31/22 1825)  Temp Source: Oral (03/31/22 1313)  Respirations: 18 (03/31/22 1800)  Height: 5' 9" (175 3 cm) (03/31/22 1313)  Weight - Scale: 114 kg (252 lb) (03/31/22 1313)  SpO2: 97 % (03/31/22 1825)    Physical Exam  Vitals reviewed  Constitutional:       General: He is not in acute distress  Appearance: Normal appearance  HENT:      Head: Normocephalic and atraumatic  Right Ear: External ear normal       Left Ear: External ear normal       Nose: Nose normal       Mouth/Throat:      Mouth: Mucous membranes are moist       Pharynx: Oropharynx is clear  Comments: Poor dentition  Eyes:      General: No scleral icterus  Extraocular Movements: Extraocular movements intact  Conjunctiva/sclera: Conjunctivae normal       Pupils: Pupils are equal, round, and reactive to light  Cardiovascular:      Rate and Rhythm: Normal rate and regular rhythm  Pulses: Normal pulses  Heart sounds: Normal heart sounds  Pulmonary:      Effort: Pulmonary effort is normal  No respiratory distress  Breath sounds: Normal breath sounds  No wheezing, rhonchi or rales  Abdominal:      General: Bowel sounds are normal  There is distension  Palpations: Abdomen is soft  Tenderness: There is no abdominal tenderness  There is no rebound  Comments: Patient had palpable firm mass in LLQ, was no longer palpable after bowel movement   Musculoskeletal:         General: No swelling  Comments: Varicose veins bilaterally on calves    Skin:     General: Skin is warm and dry  Capillary Refill: Capillary refill takes less than 2 seconds  Coloration: Skin is not jaundiced  Neurological:      Mental Status: He is alert and oriented to person, place, and time  Mental status is at baseline  Motor: No weakness  Psychiatric:         Mood and Affect: Mood normal          Behavior: Behavior normal           Additional Data:     Lab Results:  Results from last 7 days   Lab Units 03/31/22  1402   WBC Thousand/uL 8 71   HEMOGLOBIN g/dL 13 4   HEMATOCRIT % 39 9   PLATELETS Thousands/uL 199   NEUTROS PCT % 75   LYMPHS PCT % 17   MONOS PCT % 6   EOS PCT % 1     Results from last 7 days   Lab Units 03/31/22  1402   SODIUM mmol/L 139   POTASSIUM mmol/L 3 6   CHLORIDE mmol/L 105   CO2 mmol/L 25   BUN mg/dL 34*   CREATININE mg/dL 2 06*   ANION GAP mmol/L 9   CALCIUM mg/dL 9 3   ALBUMIN g/dL 3 7   TOTAL BILIRUBIN mg/dL 0 97   ALK PHOS U/L 71   ALT U/L 22   AST U/L 18   GLUCOSE RANDOM mg/dL 123                       Imaging: Reviewed prior CT A/P images directly and report  No orders to display       EKG and Other Studies Reviewed on Admission:   · EKG: NSR  HR 60s  ** Please Note: This note has been constructed using a voice recognition system   **

## 2022-04-01 NOTE — ASSESSMENT & PLAN NOTE
Patient with BP of 216/105 mmHg on presentation likely secondary to pain and anxiety  Patient's pressure improved to 179/84 mmHg  Pressure increased again to 197/96 mmHg  No evidence of end organ damage    Last Blood Pressure: (!) 186/100 mmHg    Plan:  · PRN IV Lopressor 5 mg q8h for SBP > 180 mmHg; patient received dose on 04/01 @ 0806  · PRN switched to IV labetalol 10 mg q 6h for SBP > 180 mmHg  · Pressures continued to be elevated, start amlodipine 5 mg daily  · Monitor blood pressures and for adverse effects of antihypertensive

## 2022-04-01 NOTE — ASSESSMENT & PLAN NOTE
Recent Labs     03/31/22  1402 04/01/22  0539   CREATININE 2 06* 1 64*   EGFR 32 42     Estimated Creatinine Clearance: 53 7 mL/min (A) (by C-G formula based on SCr of 1 64 mg/dL (H))   POA  Unknown baseline  Cannot determine if YANELIS without baseline   UA: negative   Imaging: CT a/p completed outpatient - Moderate to severe bilateral hydroureteronephrosis as well as a distended bladder exhibiting an apex diverticulum all suggesting chronic bladder outlet obstruction related to prostatomegaly  Recommend prompt urologic consultation  Additionally, there is some irregular left mid to upper renal pole cortical enhancement evident for which infection is of concern and correlation with urinalysis is advised     Likely post renal due to urinary obstruction secondary to prostatomegaly, possible pre renal with recent gastrointestinal illness with diarrhea     Plan:   Maintain Reyes catheter   o Urology consulted; appreciate recommendations, see above   UA negative, patient afebrile, patient with mild leukocytosis today  o Follow urine culture   Urinary retention protocol, Bladder scan, Daily weights, I/O   Monitor BMP daily and observe for downward trend of creatinine   Avoid hypoperfusion of the kidneys, minimize nephrotoxins   Encourage PO intake

## 2022-04-01 NOTE — CASE MANAGEMENT
Case Management Discharge Planning Note    Patient name Adam Donohue  Location W /W [address] [id number] MRN [id number]  : 1955 Date 2022       Current Admission Date: 3/31/2022  Current Admission Diagnosis:Urinary (tract) obstruction   Patient Active Problem List    Diagnosis Date Noted    Hypokalemia 2022    Hydroureteronephrosis 2022    Urinary (tract) obstruction 2022    Elevated serum creatinine 2022    Hypertensive urgency 2022    Enlarged prostate with urinary obstruction 2022    Medicare annual wellness visit, subsequent 2022    Varicose veins of both lower extremities 2022    BMI 38 0-38 9,adult 2021    Obesity (BMI 30-39 9) 2021    Immunization due 2021    Colon cancer screening 2021    Prostate cancer screening 2021    Screening for diabetes mellitus (DM) 2021    Screening for cardiovascular, respiratory, and genitourinary diseases 2021    Need for hepatitis C screening test 2021    Allergic rhinitis 2013      LOS (days): 1  Geometric Mean LOS (GMLOS) (days): 2 20  Days to GMLOS:1 3     OBJECTIVE:  Risk of Unplanned Readmission Score: 9         Current admission status: Inpatient   Preferred Pharmacy:   [facility] [address] [facility] [address]  Phone: [phone] Fax: [phone] [address] ([address] [place] ([address] [place] [address] [address])  [address] [address] [place] [address] 70 ([address] [facility] [address])  [place] [address]  Phone: [phone] Fax: [phone]    Primary Care Provider: Bonnie Han DO    Primary Insurance: MEDICARE  Secondary Insurance: [facility]    DISCHARGE DETAILS:  [facility] discharge for Home SN/Reyes  Fax number for AVS/F2F is [phone]  CM spoke with patient and family at the bedside   Patient and family updated [facility] able to accept for [facility] SN/Reyes at discharge  Patient and spouse updated [facility] contact information will be on discharge paperwork  All questions/concerns answered at this time

## 2022-04-01 NOTE — CASE MANAGEMENT
Case Management Assessment & Discharge Planning Note    Patient name Bharathi Oliveira  Location W /W [place] 596-57 MRN [id number]  : 1955 Date 2022       Current Admission Date: 3/31/2022  Current Admission Diagnosis:Urinary (tract) obstruction   Patient Active Problem List    Diagnosis Date Noted    Hydroureteronephrosis 2022    Urinary (tract) obstruction 2022    Elevated serum creatinine 2022    Hypertensive urgency 2022    Enlarged prostate with urinary obstruction 2022    Medicare annual wellness visit, subsequent 2022    Varicose veins of both lower extremities 2022    BMI 38 0-38 9,adult 2021    Obesity (BMI 30-39 9) 2021    Immunization due 2021    Colon cancer screening 2021    Prostate cancer screening 2021    Screening for diabetes mellitus (DM) 2021    Screening for cardiovascular, respiratory, and genitourinary diseases 2021    Need for hepatitis C screening test 2021    Allergic rhinitis 2013      LOS (days): 1  Geometric Mean LOS (GMLOS) (days): 2 20  Days to GMLOS:1 4     OBJECTIVE:    Risk of Unplanned Readmission Score: 7      Current admission status: Inpatient       [facility] Pharmacy:   [place] [address] [facility] [address] [facility] [address]  Phone: [phone] Fax: [phone] [address] ([address] [facility] ([address] [facility] [address])  [address] [address] [place] [address] 70 ([address] [facility] [address])  [place] [address]  Phone: [phone] Fax: [phone]    Primary Care Provider: Gustavo Polk DO    Primary Insurance: MEDICARE  Secondary Insurance: AARP    ASSESSMENT:  [facility] Representative - Spouse   Primary Phone: [phone] (Mobile)               Advance Directives  Does patient have a Health Care POA?: Yes  Primary Contact: Maddie-spouse    Readmission Root Cause  30 Day Readmission: No    Patient Information  Admitted from[de-identified] Home  Mental Status: Alert  During Assessment patient was accompanied by: Spouse (via phone)  Assessment information provided by[de-identified] Patient,Spouse  Primary Caregiver: Self  Support Systems: Spouse/significant other,Family [facility] of Residence: [address] [place] [address] do you live in?: [place]  Living Arrangements: Lives w/ Spouse/significant other    Activities of Daily Living Prior to Admission  Functional Status: Independent  Completes ADLs independently?: Yes  Ambulates independently?: Yes  Does patient use assisted devices?: No  Does patient currently own DME?: No  Does patient have a history of Outpatient Therapy (PT/OT)?: No  Does the patient have a history of Short-Term Rehab?: No  Does patient have a history of HHC?: No  Does patient currently have [facility]?: No    Patient Information Continued  Income Source: Pension/long term  Does patient have prescription coverage?: Yes  Food insecurity resource given?: No  Does patient receive dialysis treatments?: No  Does patient have a history of substance abuse?: No  Does patient have a history of Mental Health Diagnosis?: No    PHQ 2/9 Screening   Reviewed PHQ 2/9 Depression Screening Score?: No    Means of Transportation  Means of Transport to Appts[de-identified] Drives Self  In the past 12 months, has lack of transportation kept you from medical appointments or from getting medications?: No  In the past 12 months, has lack of transportation kept you from meetings, work, or from getting things needed for daily living?: No  Was application for public transport provided?: No    DISCHARGE DETAILS:    Discharge planning discussed with[de-identified] patient and spouse  Freedom of Choice: Yes     CM contacted family/caregiver?: Yes  Were Treatment Team discharge recommendations reviewed with patient/caregiver?: Yes  Did patient/caregiver verbalize understanding of patient care needs?: Yes  Were patient/caregiver advised of the risks associated with not following Treatment Team discharge recommendations?: Yes    Contacts  Patient Contacts: Maddie-spouse  Relationship to Patient[de-identified] Family  Contact Method: Phone  Reason/Outcome: Continuity of [address]         Is the patient interested in Shashi Gonzalez at discharge?: Yes  [address] Charles requested[de-identified] [address] Name[de-identified] Other  [facility] Provider[de-identified] PCP  Home Health Services Needed[de-identified] Urinary Incontinence Catheter Management  Homebound Criteria Met[de-identified] Requires the Assistance of Another Person for Safe Ambulation or to Leave the Home  Supporting Clincal Findings[de-identified] Limited Endurance    DME Referral Provided  Referral made for DME?: No    Other Referral/Resources/Interventions Provided:  Interventions: [facility]  Referral Comments: [facility] referral sent to [facility]NA in [address] [place] [address]-waiting for options for SN/Reyes    Treatment Team Recommendation: Home  Discharge Destination Plan[de-identified] Home with [place] at Discharge : Family     CM spoke with patient and spouse at the bedside  Patient's spouse on cell phone  CM introduced self and role  Patient independent at baseline  Patient stated this is the first time he had a catheter  CM discussed caring for catheter/teaching at bedside with patient and spouse  Patient's spouse stated she is familiar with catheters but that it has been awhile  Spouse worked as a CNA  CM discussed [facility] at discharge for SN/Reyes  Patient and spouse are both open to blanket referrals for Home [facility] SN  CM will f/u with patient and spouse re:VNA options  CM confirmed patient lives in [address] [place] [address]  Patient uses [facility] in [place] for prescriptions  Patient has prescription insurance and is able to afford his medications  Patient's PCP is Dr Berry Counter  Patient is Covid vaccinated and boosted  Family will transport at discharge       CM sent blanket referrals to [facility]NA  Waiting for options  CM reviewed discharge planning process including the following: identifying caregivers at home, preference for d/c planning needs,   availability of [facility] to [facility] program, availability of treatment team to discuss questions or concerns patient and/or family may have regarding diagnosis, plan of care, old or new medications and discharge planning   CM will continue to follow for care coordination and update assessment as appropriate

## 2022-04-01 NOTE — PROGRESS NOTES
[facility]  Progress Note Sana Valdovinos 1955, 74 y o  male MRN: [id number]  Unit/Bed#: W -01 Encounter: [id number]  Primary Care Provider: Moisés Muniz DO   Date and time admitted to hospital: 3/31/2022  1:33 PM    * Urinary (tract) obstruction  Assessment & Plan  Patient with abdominal pain x2 weeks, initially started with gastrointestinal upset/diarrhea  He and son were sick and presented to PCP  On physical exam, abdomen was more distended compared to baseline  Sent patient for outpatient CT a/p, results outlined below  Patient with history of BPH diagnosed > 10 years ago, patient has not taken medications for BPH or follow with urology  CT a/p 03/25: Moderate to severe bilateral hydroureteronephrosis as well as a distended bladder exhibiting an apex diverticulum all suggesting chronic bladder outlet obstruction related to prostatomegaly  Recommend prompt urologic consultation  Additionally, there is some irregular left mid to upper renal pole cortical enhancement evident for which infection is of concern and correlation with urinalysis is advised  Patient had abdominal pain and distention of abdomen on presentation  PVR was assessed and was 940 mL, patient urinated 220 mL, repeat PVR was > 999 mL  Nathan catheter was inserted in the ED  Patient with mild hematuria after nathan insertion  Plan:  · Maintain Nathan catheter; urinary retention protocol  · Patient with mild leukocytosis today, continue to monitor and follow pending urine culture  · Urology consulted; appreciate recommendations  · Recommend urine culture  · Repeat ultrasound in 48 hours to monitor hydronephrosis  · Follow up outpatient for trial of void and full BPH work up with potential cystoscopy/TRUS  · Continue tamsulosin 0 4 mg QDinner  · Daily weights and Is&Os  · Irrigate nathan catheter as needed for red-tinged/hematuria of urine in bag QID  Monitor for clots in bag      Elevated serum creatinine  Assessment & Plan  Recent Labs     03/31/22  1402 04/01/22  0539   CREATININE 2 06* 1 64*   EGFR 32 42     Estimated Creatinine Clearance: 53 7 mL/min (A) (by C-G formula based on SCr of 1 64 mg/dL (H))   POA  Unknown baseline  Cannot determine if YANELIS without baseline   UA: negative   Imaging: CT a/p completed outpatient - Moderate to severe bilateral hydroureteronephrosis as well as a distended bladder exhibiting an apex diverticulum all suggesting chronic bladder outlet obstruction related to prostatomegaly  Recommend prompt urologic consultation  Additionally, there is some irregular left mid to upper renal pole cortical enhancement evident for which infection is of concern and correlation with urinalysis is advised   Likely post renal due to urinary obstruction secondary to prostatomegaly, possible pre renal with recent gastrointestinal illness with diarrhea     Plan:   Maintain Reyes catheter   o Urology consulted; appreciate recommendations, see above   UA negative, patient afebrile, patient with mild leukocytosis today  o Follow urine culture   Urinary retention protocol, Bladder scan, Daily weights, I/O   Monitor BMP daily and observe for downward trend of creatinine   Avoid hypoperfusion of the kidneys, minimize nephrotoxins   Encourage PO intake    Hydroureteronephrosis  Assessment & Plan  Evidence on CT a/p on 03/25/22  See urinary tract obstruction above  Hypertensive urgency  Assessment & Plan  Patient with BP of 216/105 mmHg on presentation likely secondary to pain and anxiety  Patient's pressure improved to 179/84 mmHg  Pressure increased again to 197/96 mmHg  No evidence of end organ damage    Last Blood Pressure: (!) 186/100 mmHg    Plan:  · PRN IV Lopressor 5 mg q8h for SBP > 180 mmHg; patient received dose on 04/01 @ 0806  · PRN switched to IV labetalol 10 mg q 6h for SBP > 180 mmHg  · Pressures continued to be elevated, start amlodipine 5 mg daily  · Monitor blood pressures and for adverse effects of antihypertensive     Hypokalemia  Assessment & Plan  Patient with hypokalemia this morning likely due secondary to post obstructive diuresis    Lab Results   Component Value Date    K 3 4 (L) 2022    K 3 6 2022     Plan:  · Repleted with 20 mEq KDur; replete as needed  · Patient was started on plasmalyte fluid hydration overnight, transition to 45% normal saline at 75 mL/hr  · Monitor with BMP in a m  Varicose veins of both lower extremities  Assessment & Plan  Patient with history of varicose veins bilaterally  Patient has history of bleeding periodically if he hits against something or occasionally from showers  Management with pressure as needed and compression stockings daily  Plan:  · Continue compression stockings as patient wears at home  · Monitor for rupture/bleeding of veins          VTE Pharmacologic Prophylaxis: VTE Score: 4 Moderate Risk (Score 3-4) - Pharmacological DVT Prophylaxis Ordered: enoxaparin (Lovenox)  Patient Centered Rounds: I performed bedside rounds with nursing staff today  Discussions with Specialists or Other Care Team Provider: Nursing, Case management, Urology    Education and Discussions with Family / Patient: Will attempt to update patient's wife via phone after formal room  Current Length of Stay: 1 day(s)  Current Patient Status: Inpatient   Discharge Plan: Anticipate discharge in 48 hrs to home  Code Status: Level 1 - Full Code    Subjective:   Patient seen and evaluated at bedside  No acute events overnight  Patient reports continued relief of abdominal pain  Patient reports having flatulence, but no bowel movements since yesterday morning, bowel regimen is in place for patient  Patient denied chest pain, shortness of breath, nausea, vomiting, diarrhea  Patient denied discomfort from Reyes catheter at this time      Objective:     Vitals:   Temp (24hrs), Av °F (36 7 °C), Min:97 6 °F (36 4 °C), Max:98 4 °F (36 9 °C)    Temp:  [97 6 °F (36 4 °C)-98 4 °F (36 9 °C)] 97 6 °F (36 4 °C)  HR:  [63-98] 68  Resp:  [16-20] 18  BP: (144-216)/() 178/92  SpO2:  [92 %-98 %] 95 %  Body mass index is 37 21 kg/m²  Input and Output Summary (last 24 hours): Intake/Output Summary (Last 24 hours) at 4/1/2022 1102  Last data filed at 4/1/2022 0820  Gross per 24 hour   Intake 840 ml   Output 4125 ml   Net -3285 ml       Physical Exam:   Physical Exam  Vitals reviewed  Constitutional:       Appearance: He is obese  HENT:      Head: Normocephalic and atraumatic  Comments: Poor dentition     Right Ear: External ear normal       Left Ear: External ear normal       Nose: Nose normal       Mouth/Throat:      Mouth: Mucous membranes are moist       Pharynx: Oropharynx is clear  Eyes:      General: No scleral icterus  Conjunctiva/sclera: Conjunctivae normal       Pupils: Pupils are equal, round, and reactive to light  Cardiovascular:      Rate and Rhythm: Normal rate and regular rhythm  Pulses: Normal pulses  Heart sounds: Normal heart sounds  Pulmonary:      Effort: Pulmonary effort is normal  No respiratory distress  Breath sounds: Normal breath sounds  No wheezing or rales  Abdominal:      General: Bowel sounds are normal  There is distension (Improved compared to prior)  Palpations: Abdomen is soft  Tenderness: There is no abdominal tenderness  There is no guarding  Genitourinary:     Comments: Reyes catheter draining clear urine at time of evaluation  Skin:     General: Skin is warm and dry  Capillary Refill: Capillary refill takes less than 2 seconds  Coloration: Skin is not jaundiced  Comments: Varicose veins present on lower extremities bilaterally   Neurological:      Mental Status: He is alert and oriented to person, place, and time  Mental status is at baseline  Motor: No weakness     Psychiatric:         Mood and Affect: Mood normal  Behavior: Behavior normal           Additional Data:     Labs:  Results from last 7 days   Lab Units 04/01/22  0539 03/31/22  1402 03/31/22  1402   WBC Thousand/uL 11 17*   < > 8 71   HEMOGLOBIN g/dL 14 1   < > 13 4   HEMATOCRIT % 42 3   < > 39 9   PLATELETS Thousands/uL 214   < > 199   NEUTROS PCT %  --   --  75   LYMPHS PCT %  --   --  17   MONOS PCT %  --   --  6   EOS PCT %  --   --  1    < > = values in this interval not displayed       Results from last 7 days   Lab Units 04/01/22  0539   SODIUM mmol/L 142   POTASSIUM mmol/L 3 4*   CHLORIDE mmol/L 105   CO2 mmol/L 26   BUN mg/dL 28*   CREATININE mg/dL 1 64*   ANION GAP mmol/L 11   CALCIUM mg/dL 9 4   ALBUMIN g/dL 3 5   TOTAL BILIRUBIN mg/dL 1 51*   ALK PHOS U/L 73   ALT U/L 19   AST U/L 13   GLUCOSE RANDOM mg/dL 115                       Lines/Drains:  Invasive Devices  Report    Peripheral Intravenous Line            Peripheral IV 03/31/22 Right Forearm <1 day          Drain            Urethral Catheter Double-lumen 16 Fr  <1 day              Urinary Catheter:  Goal for removal: N/A- Discharging with Reyes               Imaging: Reviewed radiology reports from this admission including: abdominal/pelvic CT    Recent Cultures (last 7 days):         Last 24 Hours Medication List:   Current Facility-Administered Medications   Medication Dose Route Frequency Provider Last Rate    amLODIPine  5 mg Oral Daily Kaylee Capps DO      enoxaparin  40 mg Subcutaneous Daily Kaylee Capps DO      Labetalol HCl  10 mg Intravenous Q6H PRN Kaylee Capps DO      polyethylene glycol  17 g Oral Daily Kaylee Capps DO      senna-docusate sodium  1 tablet Oral HS Kaylee Capps DO      sodium chloride  75 mL/hr Intravenous Continuous Kvng Connell MD 75 mL/hr (04/01/22 2713)    tamsulosin  0 4 mg Oral Daily With TXLUIS Dawn DO          Today, Patient Was Seen By: Chana Frankel DO    **Please Note: This note may have been constructed using a voice recognition system  **

## 2022-04-01 NOTE — CONSULTS
CONSULT    Patient Name: Forrest Richmond  Patient MRN: [id number]  Date of Service: 3/31/2022   Date / Time Note Created: 3/31/2022 9:07 PM   Referring Provider: Miki Islas MD    Provider Creating Note: ELIEL Garland    PCP: Charanjit Spears  Attending Provider:  Miki Islas MD    Reason for Consult: Bladder Outlet Obstruction & Hydronephrosis     HPI:  Forrest Richmond is a 33-year-old male presenting to PCP with abdominal pain and referred outpatient CT scan this demonstrated moderate to severe bilateral hydronephrosis secondary to over distended bladder with apex diverticula and significant prostatomegaly  Patient denies prior  history and reports occasional urinary dribbling, substantial urinary frequency and urgency; as well as, occasional urinary incontinence without dysuria, hematuria, flank, abdominal, testicular or groin pain  He denies prior formal urologic consultation, evaluation or  surgical manipulation, history of nephrolithiasis or  surgical manipulation in the past   Serum creatinine 2 09 from unknown baseline  He is afebrile, hemodynamically stable and reports relief of abdominal pain after catheter in the ER for initial yield of 1 3 L  Urologic consultation requested for further management recommendations  Active Problems:    Patient Active Problem List   Diagnosis    Allergic rhinitis    BMI 38 0-38 9,adult    Obesity (BMI 30-39  9)    Immunization due    Colon cancer screening    Prostate cancer screening    Screening for diabetes mellitus (DM)    Screening for cardiovascular, respiratory, and genitourinary diseases    Need for hepatitis C screening test    Hydroureteronephrosis    Urinary (tract) obstruction    Elevated serum creatinine    Hypertensive urgency    Enlarged prostate with urinary obstruction    Medicare annual wellness visit, subsequent    Varicose veins of both lower extremities             Impressions   Bladder Outlet Obstruction likely due to BPH   Severe Urinary Retention secondary to above   Bilateral Hydronephrosis secondary to all other the previous   YANELIS   Hematuria ex vacuo    Recommendations  1  Insert/maintain nathan catheter  It is not to be removed or nursing managed  2  Nursing staff may manually irrigate q i d  [place] 3  Continue Flomax  Will require this medication moving forward  4  Monitor creatinine trend  5  Await urine cultures  6  Repeat US in 48 hours to re-evaluate hydronephrosis   7  Appreciate Nephrology input  8  Patient will  require catheter at discharge  9  Follow-up OP for trial of void and full BPH work-up with potential for cystoscopy & TRUS  Past Medical History:   Diagnosis Date    Abnormal weight gain 06/12/2008    LAST ASSESSED: 6/12/08    Anal fissure 03/05/2010    LAST ASSESSED: 3/5/10       History reviewed  No pertinent surgical history      Family History   Problem Relation Age of Onset    Diabetes Mother         MELLITUS    Hypertension Mother        Social History     Socioeconomic History    Marital status: /Civil Union     Spouse name: Not on file    Number of children: Not on file    Years of education: Not on file    Highest education level: Not on file   Occupational History    Not on file   Tobacco Use    Smoking status: Former Smoker    Smokeless tobacco: Never Used   Substance and Sexual Activity    Alcohol use: Never    Drug use: Never    Sexual activity: Not on file   Other Topics Concern    Not on file   Social History Narrative    Not on file     Social Determinants of Health     Financial Resource Strain: Not on file   Food Insecurity: Not on file   Transportation Needs: Not on file   Physical Activity: Not on file   Stress: Not on file   Social Connections: Not on file   Intimate Partner Violence: Not on file   Housing Stability: Not on file       No Known Allergies    Review of Systems  10 point review of systems negative except as noted in HPI  Constitutional:   negative for - chills or fever  Hematological and Lymphatic:   negative  Cardiovascular:   no chest pain or dyspnea on exertion  Gastrointestinal:   positive for - abdominal pain  Genito-Urinary:   positive for - change in urinary stream, incontinence and urinary frequency/urgency  negative for - dysuria, hematuria or scrotal mass/pain  Neurological:   no TIA or stroke symptoms     Chart Review   Allergies, current medications, history, problem list    Vital Signs  /90 (BP Location: Right arm)   Pulse 78   Temp 98 °F (36 7 °C)   Resp 18   Ht 5' 9" (1 753 m)   Wt 114 kg (252 lb)   SpO2 97%   BMI 37 21 kg/m²     Physical Exam  General appearance: alert and oriented, in no acute distress, appears stated age, cooperative, no distress and moderately obese  Head: Normocephalic, without obvious abnormality, atraumatic  Neck: no adenopathy, no carotid bruit, no JVD, supple, symmetrical, trachea midline and thyroid not enlarged, symmetric, no tenderness/mass/nodules  Lungs: clear to auscultation bilaterally  Heart: regular rate and rhythm, S1, S2 normal, no murmur, click, rub or gallop  Abdomen: soft, non-tender; bowel sounds normal; no masses,  no organomegaly  Extremities: extremities normal, warm and well-perfused; no cyanosis, clubbing, or edema  Pulses: 2+ and symmetric  Neurologic: Grossly normal  Reyes--yellow with pink tinge  Clear     Laboratory Studies  Lab Results   Component Value Date    K 3 6 03/31/2022     03/31/2022    CO2 25 03/31/2022    CREATININE 2 06 (H) 03/31/2022    BUN 34 (H) 03/31/2022     Lab Results   Component Value Date    WBC 8 71 03/31/2022    RBC 4 52 03/31/2022    HGB 13 4 03/31/2022    HCT 39 9 03/31/2022    MCV 88 03/31/2022    MCH 29 6 03/31/2022    RDW 12 9 03/31/2022     03/31/2022       Imaging and Other Studies  )  CT abdomen pelvis w contrast    Result Date: 3/31/2022  Narrative: CT ABDOMEN AND PELVIS WITH IV CONTRAST INDICATION: R19 7: Diarrhea, unspecified R14 0: Abdominal distension (gaseous) R10 84: Generalized abdominal pain  COMPARISON:  None  TECHNIQUE:  CT examination of the abdomen and pelvis was performed  Axial, sagittal, and coronal 2D reformatted images were created from the source data and submitted for interpretation  Radiation dose length product (DLP) for this visit:  2489 2 mGy-cm   This examination, like all CT scans performed in the [facility], was performed utilizing techniques to minimize radiation dose exposure, including the use of iterative  reconstruction and automated exposure control  IV Contrast:  100 mL of iohexol (OMNIPAQUE) Enteric Contrast:  Enteric contrast was administered  FINDINGS: ABDOMEN LOWER CHEST:  No clinically significant abnormality identified in the visualized lower chest  LIVER/BILIARY TREE:  Unremarkable  GALLBLADDER:  No calcified gallstones  No pericholecystic inflammatory change  SPLEEN:  Unremarkable  PANCREAS:  Unremarkable  ADRENAL GLANDS:  Unremarkable  KIDNEYS/URETERS:  Moderate to severe hydroureteronephrosis identified in a patient with an enlarged prostate  The bladder is distended with apex diverticulum  There is some heterogeneous cortical enhancement portal venous phase left upper and mid poles concerning for possible infection  Correlate with urinalysis  Excretion is noted into the renal collecting system bilaterally and delayed phase  STOMACH AND BOWEL:  Unremarkable  APPENDIX:  A normal appendix was visualized  ABDOMINOPELVIC CAVITY:  No ascites  No pneumoperitoneum  No lymphadenopathy  VESSELS:  Unremarkable for patient's age  PELVIS REPRODUCTIVE ORGANS:  The prostate is enlarged  URINARY BLADDER:  Distended with a diverticulum at the trigone suggesting chronic outlet obstruction  ABDOMINAL WALL/INGUINAL REGIONS:  There is a small fat-containing umbilical hernia  OSSEOUS STRUCTURES:  No acute fracture or destructive osseous lesion       Impression: Moderate to severe bilateral hydroureteronephrosis as well as a distended bladder exhibiting an apex diverticulum all suggesting chronic bladder outlet obstruction related to prostatomegaly  Recommend prompt urologic consultation  Additionally, there is some irregular left mid to upper renal pole cortical enhancement evident for which infection is of concern and correlation with urinalysis is advised  The study was marked in [facility] for immediate notification   Workstation performed: [id number]       Medications   Current Facility-Administered Medications   Medication Dose Route Frequency Provider Last Rate    [START ON 4/1/2022] enoxaparin  40 mg Subcutaneous Daily Kaylee Capps DO      metoprolol  5 mg Intravenous Q8H PRN Kaylee Capps DO      [START ON 4/1/2022] polyethylene glycol  17 g Oral Daily Kaylee Capps DO      senna-docusate sodium  1 tablet Oral HS Kaylee Capps DO      tamsulosin  0 4 mg Oral Daily With TXU LópezDO Jeanie CRNP

## 2022-04-01 NOTE — ASSESSMENT & PLAN NOTE
Patient with history of varicose veins bilaterally  Patient has history of bleeding periodically if he hits against something or occasionally from showers  Management with pressure as needed and compression stockings daily      Plan:  · Continue compression stockings as patient wears at home  · Monitor for rupture/bleeding of veins

## 2022-04-01 NOTE — ASSESSMENT & PLAN NOTE
Patient with hypokalemia this morning likely due secondary to post obstructive diuresis    Lab Results   Component Value Date    K 3 4 (L) 04/01/2022    K 3 6 03/31/2022     Plan:  · Repleted with 20 mEq KDur; replete as needed  · Patient was started on plasmalyte fluid hydration overnight, transition to 45% normal saline at 75 mL/hr  · Monitor with BMP in a m

## 2022-04-01 NOTE — PROGRESS NOTES
Progress Note - Kiel Dorsey 74 y o  male MRN: [id number]    Unit/Bed#: W -17 Encounter: [id number]      Assessment:  Kiel Dorsey is a 55-year-old male presenting with abdominal pain and CT finding of bladder outlet obstruction due to prostatomegaly with high volume urinary retention and resultant bilateral hydronephrosis  Admitted for acute kidney injury  He is status post Reyes catheter inserted for initial urine yield exceeding 1 3 L  Admission serum creatinine was 2 09  Baseline unknown  Urine culture pending  No leukocytosis or fever  Plan:  · Continue medical optimization  · Follow creatinine trend  · Maintain Reyes catheter to straight drainage  Do not remove  · Flomax  · Repeat ultrasound tomorrow  · Follow-up outpatient for full BPH workup including cystoscopy and trans rectal ultrasound at an interval     Subjective:   No complaints    Objective:     Vitals: Blood pressure 164/82, pulse 68, temperature 97 6 °F (36 4 °C), resp  rate 18, height 5' 9" (1 753 m), weight 114 kg (252 lb), SpO2 95 %  ,Body mass index is 37 21 kg/m²        Intake/Output Summary (Last 24 hours) at 4/1/2022 6660  Last data filed at 4/1/2022 0820  Gross per 24 hour   Intake 840 ml   Output 4125 ml   Net -3285 ml       Physical Exam: General appearance: alert and oriented, in no acute distress, appears stated age, cooperative and no distress  Head: Normocephalic, without obvious abnormality, atraumatic  Neck: no adenopathy, no carotid bruit, no JVD, supple, symmetrical, trachea midline and thyroid not enlarged, symmetric, no tenderness/mass/nodules  Lungs: clear to auscultation bilaterally  Heart: regular rate and rhythm, S1, S2 normal, no murmur, click, rub or gallop  Abdomen: soft, non-tender; bowel sounds normal; no masses,  no organomegaly  Extremities: extremities normal, warm and well-perfused; no cyanosis, clubbing, or edema  Pulses: 2+ and symmetric  Neurologic: Grossly normal  Reyes--clear yellow urine     Invasive Devices  Report    Peripheral Intravenous Line            Peripheral IV 03/31/22 Right Forearm <1 day          Drain            Urethral Catheter Double-lumen 16 Fr  <1 day              Lab Results   Component Value Date    WBC 8 71 03/31/2022    HGB 13 4 03/31/2022    HCT 39 9 03/31/2022    MCV 88 03/31/2022     03/31/2022     Lab Results   Component Value Date    SODIUM 142 04/01/2022    K 3 4 (L) 04/01/2022     04/01/2022    CO2 26 04/01/2022    BUN 28 (H) 04/01/2022    CREATININE 1 64 (H) 04/01/2022    GLUC 115 04/01/2022    CALCIUM 9 4 04/01/2022         Lab, Imaging and other studies: I have personally reviewed pertinent reports

## 2022-04-01 NOTE — PLAN OF CARE
Problem: Potential for Falls  Goal: Patient will remain free of falls  Description: INTERVENTIONS:  - Educate patient/family on patient safety including physical limitations  - Instruct patient to call for assistance with activity   - Consult OT/PT to assist with strengthening/mobility   - Keep Call bell within reach  - Keep bed low and locked with side rails adjusted as appropriate  - Keep care items and personal belongings within reach  - Initiate and maintain comfort rounds  - Make Fall Risk Sign visible to staff  - Offer Toileting every  Hours, in advance of need  - Initiate/Maintain alarm  - Obtain necessary fall risk management equipment  - Apply yellow socks and bracelet for high fall risk patients  - Consider moving patient to room near nurses station  Outcome: Progressing     Problem: PAIN - ADULT  Goal: Verbalizes/displays adequate comfort level or baseline comfort level  Description: Interventions:  - Encourage patient to monitor pain and request assistance  - Assess pain using appropriate pain scale  - Administer analgesics based on type and severity of pain and evaluate response  - Implement non-pharmacological measures as appropriate and evaluate response  - Consider cultural and social influences on pain and pain management  - Notify physician/advanced practitioner if interventions unsuccessful or patient reports new pain  Outcome: Progressing     Problem: INFECTION - ADULT  Goal: Absence or prevention of progression during hospitalization  Description: INTERVENTIONS:  - Assess and monitor for signs and symptoms of infection  - Monitor lab/diagnostic results  - Monitor all insertion sites, i e  indwelling lines, tubes, and drains  - Monitor endotracheal if appropriate and nasal secretions for changes in amount and color  - [place] appropriate cooling/warming therapies per order  - Administer medications as ordered  - Instruct and encourage patient and family to use good hand hygiene technique  - Identify and instruct in appropriate isolation precautions for identified infection/condition  Outcome: Progressing     Problem: DISCHARGE PLANNING  Goal: Discharge to home or other facility with appropriate resources  Description: INTERVENTIONS:  - Identify barriers to discharge w/patient and caregiver  - Arrange for needed discharge resources and transportation as appropriate  - Identify discharge learning needs (meds, wound care, etc )  - Arrange for interpretive services to assist at discharge as needed  - Refer to Case Management Department for coordinating discharge planning if the patient needs post-hospital services based on physician/advanced practitioner order or complex needs related to functional status, cognitive ability, or social support system  Outcome: Progressing     Problem: Knowledge Deficit  Goal: Patient/family/caregiver demonstrates understanding of disease process, treatment plan, medications, and discharge instructions  Description: Complete learning assessment and assess knowledge base    Interventions:  - Provide teaching at level of understanding  - Provide teaching via preferred learning methods  Outcome: Progressing

## 2022-04-01 NOTE — ED NOTES
Nathan catheter inserted without difficulty  1300 mL of urine post nathan catheter insertion  Dr Selvin Randolph made aware       Jn Parry, HENRIQUE  03/31/22 [id number]

## 2022-04-01 NOTE — ASSESSMENT & PLAN NOTE
Patient with BP of 216/105 mmHg on presentation likely secondary to pain and anxiety  Patient's pressure improved to 179/84 mmHg  Pressure increased again to 197/96 mmHg  No evidence of end organ damage  Plan:  · PRN IV Lopressor 5 mg q8h for SBP > 180 mmHg  · Consider adding maintenance antihypertensive if blood pressures persistently elevated  Patient reports intermittent hypertension during outpatient appointments being monitored by PCP

## 2022-04-02 ENCOUNTER — APPOINTMENT (INPATIENT)
Dept: ULTRASOUND IMAGING | Facility: HOSPITAL | Age: 67
DRG: 726 | End: 2022-04-02
Payer: MEDICARE

## 2022-04-02 PROBLEM — K59.00 CONSTIPATION: Status: ACTIVE | Noted: 2022-04-02

## 2022-04-02 LAB
ALBUMIN SERPL BCP-MCNC: 3.4 G/DL (ref 3.5–5)
ALP SERPL-CCNC: 69 U/L (ref 46–116)
ALT SERPL W P-5'-P-CCNC: 18 U/L (ref 12–78)
ANION GAP SERPL CALCULATED.3IONS-SCNC: 9 MMOL/L (ref 4–13)
AST SERPL W P-5'-P-CCNC: 12 U/L (ref 5–45)
BACTERIA UR CULT: NORMAL
BILIRUB SERPL-MCNC: 1.44 MG/DL (ref 0.2–1)
BUN SERPL-MCNC: 24 MG/DL (ref 5–25)
CALCIUM ALBUM COR SERPL-MCNC: 9.2 MG/DL (ref 8.3–10.1)
CALCIUM SERPL-MCNC: 8.7 MG/DL (ref 8.3–10.1)
CHLORIDE SERPL-SCNC: 102 MMOL/L (ref 100–108)
CO2 SERPL-SCNC: 26 MMOL/L (ref 21–32)
CREAT SERPL-MCNC: 1.57 MG/DL (ref 0.6–1.3)
ERYTHROCYTE [DISTWIDTH] IN BLOOD BY AUTOMATED COUNT: 12.8 % (ref 11.6–15.1)
GFR SERPL CREATININE-BSD FRML MDRD: 44 ML/MIN/1.73SQ M
GLUCOSE SERPL-MCNC: 138 MG/DL (ref 65–140)
HCT VFR BLD AUTO: 40.1 % (ref 36.5–49.3)
HGB BLD-MCNC: 13.6 G/DL (ref 12–17)
MCH RBC QN AUTO: 29.4 PG (ref 26.8–34.3)
MCHC RBC AUTO-ENTMCNC: 33.9 G/DL (ref 31.4–37.4)
MCV RBC AUTO: 87 FL (ref 82–98)
PLATELET # BLD AUTO: 203 THOUSANDS/UL (ref 149–390)
PMV BLD AUTO: 9.9 FL (ref 8.9–12.7)
POTASSIUM SERPL-SCNC: 3.5 MMOL/L (ref 3.5–5.3)
PROT SERPL-MCNC: 7.3 G/DL (ref 6.4–8.2)
RBC # BLD AUTO: 4.62 MILLION/UL (ref 3.88–5.62)
SODIUM SERPL-SCNC: 137 MMOL/L (ref 136–145)
WBC # BLD AUTO: 10.61 THOUSAND/UL (ref 4.31–10.16)

## 2022-04-02 PROCEDURE — 85027 COMPLETE CBC AUTOMATED: CPT

## 2022-04-02 PROCEDURE — 99232 SBSQ HOSP IP/OBS MODERATE 35: CPT | Performed by: NURSE PRACTITIONER

## 2022-04-02 PROCEDURE — 99232 SBSQ HOSP IP/OBS MODERATE 35: CPT | Performed by: INTERNAL MEDICINE

## 2022-04-02 PROCEDURE — 76770 US EXAM ABDO BACK WALL COMP: CPT

## 2022-04-02 PROCEDURE — 80053 COMPREHEN METABOLIC PANEL: CPT

## 2022-04-02 RX ORDER — POLYETHYLENE GLYCOL 3350 17 G/17G
17 POWDER, FOR SOLUTION ORAL 2 TIMES DAILY
Status: DISCONTINUED | OUTPATIENT
Start: 2022-04-02 | End: 2022-04-03 | Stop reason: HOSPADM

## 2022-04-02 RX ADMIN — TAMSULOSIN HYDROCHLORIDE 0.4 MG: 0.4 CAPSULE ORAL at 17:20

## 2022-04-02 RX ADMIN — AMLODIPINE BESYLATE 5 MG: 5 TABLET ORAL at 08:43

## 2022-04-02 RX ADMIN — POLYETHYLENE GLYCOL 3350 17 G: 17 POWDER, FOR SOLUTION ORAL at 08:43

## 2022-04-02 RX ADMIN — ENOXAPARIN SODIUM 40 MG: 40 INJECTION SUBCUTANEOUS at 08:43

## 2022-04-02 NOTE — PLAN OF CARE
Problem: Potential for Falls  Goal: Patient will remain free of falls  Description: INTERVENTIONS:  - Educate patient/family on patient safety including physical limitations  - Instruct patient to call for assistance with activity   - Consult OT/PT to assist with strengthening/mobility   - Keep Call bell within reach  - Keep bed low and locked with side rails adjusted as appropriate  - Keep care items and personal belongings within reach  - Initiate and maintain comfort rounds  - Make Fall Risk Sign visible to staff  - Offer Toileting every  Hours, in advance of need  - Initiate/Maintain alarm  - Obtain necessary fall risk management equipment  - Apply yellow socks and bracelet for high fall risk patients  - Consider moving patient to room near nurses station  Outcome: Progressing     Problem: PAIN - ADULT  Goal: Verbalizes/displays adequate comfort level or baseline comfort level  Description: Interventions:  - Encourage patient to monitor pain and request assistance  - Assess pain using appropriate pain scale  - Administer analgesics based on type and severity of pain and evaluate response  - Implement non-pharmacological measures as appropriate and evaluate response  - Consider cultural and social influences on pain and pain management  - Notify physician/advanced practitioner if interventions unsuccessful or patient reports new pain  Outcome: Progressing     Problem: INFECTION - ADULT  Goal: Absence or prevention of progression during hospitalization  Description: INTERVENTIONS:  - Assess and monitor for signs and symptoms of infection  - Monitor lab/diagnostic results  - Monitor all insertion sites, i e  indwelling lines, tubes, and drains  - Monitor endotracheal if appropriate and nasal secretions for changes in amount and color  - [place] appropriate cooling/warming therapies per order  - Administer medications as ordered  - Instruct and encourage patient and family to use good hand hygiene technique  - Identify and instruct in appropriate isolation precautions for identified infection/condition  Outcome: Progressing     Problem: DISCHARGE PLANNING  Goal: Discharge to home or other facility with appropriate resources  Description: INTERVENTIONS:  - Identify barriers to discharge w/patient and caregiver  - Arrange for needed discharge resources and transportation as appropriate  - Identify discharge learning needs (meds, wound care, etc )  - Arrange for interpretive services to assist at discharge as needed  - Refer to Case Management Department for coordinating discharge planning if the patient needs post-hospital services based on physician/advanced practitioner order or complex needs related to functional status, cognitive ability, or social support system  Outcome: Progressing     Problem: Knowledge Deficit  Goal: Patient/family/caregiver demonstrates understanding of disease process, treatment plan, medications, and discharge instructions  Description: Complete learning assessment and assess knowledge base    Interventions:  - Provide teaching at level of understanding  - Provide teaching via preferred learning methods  Outcome: Progressing

## 2022-04-02 NOTE — PROGRESS NOTES
Progress Note - Urology  Francisco Javier Darnell 1955, 74 y o  male MRN: [id number]    Unit/Bed#: W -01 Encounter: [id number]    Bladder outlet obstruction   · CT finding of bladder outlet obstruction due to prostatomegaly with high volume retention and BL hydro  Diverticulum a the trigone   · Maintain nathan catheter  Void trial in office in 10 days   · Continue flomax     BL hydronephrosis  · Nathan in place draining clear yellow urine, had some post insertion hematuria which is resolving  · Creat on admission 2 0 slowly trending down 1 6--1 5   · US 4/2- bl hydro similar to previous study  Likely due to ongoing chronic retention   · Maintain nathan on discharge   · Will require follow up and BPH workup including cystoscopy and TRUS  · Will sign off       Subjective:   HPI: Patient is feeling well, no complaints today  Objective:  Nursing Rounds: Jean Huynh RN  Vitals: Blood pressure 160/90, pulse 89, temperature (!) 97 3 °F (36 3 °C), resp  rate 18, height 5' 9" (1 753 m), weight 114 kg (252 lb), SpO2 97 %  ,Body mass index is 37 21 kg/m²  Physical Exam  Vitals reviewed  Constitutional:       Appearance: Normal appearance  He is obese  HENT:      Head: Normocephalic and atraumatic  Cardiovascular:      Rate and Rhythm: Normal rate  Pulmonary:      Effort: Pulmonary effort is normal    Abdominal:      General: Bowel sounds are normal  There is no distension  Palpations: Abdomen is soft  Genitourinary:     Comments: Nathan draining clear yellow urine  Musculoskeletal:         General: Normal range of motion  Cervical back: Normal range of motion  Skin:     General: Skin is warm and dry  Neurological:      Mental Status: He is alert and oriented to person, place, and time  Psychiatric:         Mood and Affect: Mood normal          Behavior: Behavior normal          Thought Content:  Thought content normal          Judgment: Judgment normal          Imaging:  US   Bilateral hydronephrosis similar to prior CT given intermodality differences       Proximal ureters are dilated at the level of the kidneys, distally at the level of the bladder they are not identified      Reyes catheter decompresses the urinary bladder  Imaging reviewed - both report and images personally reviewed  Labs:  Recent Labs     03/31/22  1402 04/01/22  0539 04/02/22  0442   WBC 8 71 11 17* 10 61*     Recent Labs     03/31/22  1402 04/01/22  0539 04/02/22  0442   HGB 13 4 14 1 13 6     Recent Labs     03/31/22  1402 04/01/22  0539 04/02/22  0442   HCT 39 9 42 3 40 1     Recent Labs     03/31/22  1402 04/01/22  0539 04/02/22  0442   CREATININE 2 06* 1 64* 1 57*         History:  Past Medical History:   Diagnosis Date    Abnormal weight gain 06/12/2008    LAST ASSESSED: 6/12/08    Anal fissure 03/05/2010    LAST ASSESSED: 3/5/10     Social History     Socioeconomic History    Marital status: /Civil Union     Spouse name: None    Number of children: None    Years of education: None    Highest education level: None   Occupational History    None   Tobacco Use    Smoking status: Former Smoker    Smokeless tobacco: Never Used   Substance and Sexual Activity    Alcohol use: Never    Drug use: Never    Sexual activity: None   Other Topics Concern    None   Social History Narrative    None     Social Determinants of Health     Financial Resource Strain: Not on file   Food Insecurity: Not on file   Transportation Needs: No Transportation Needs    Lack of Transportation (Medical): No    Lack of Transportation (Non-Medical): No   Physical Activity: Not on file   Stress: Not on file   Social Connections: Not on file   Intimate Partner Violence: Not on file   Housing Stability: Not on file     History reviewed  No pertinent surgical history    Family History   Problem Relation Age of Onset    Diabetes Mother         MELLITUS    Hypertension Mother        ELIEL Wiggins  Date: 4/2/2022 Time: 3:19 PM

## 2022-04-02 NOTE — ASSESSMENT & PLAN NOTE
Patient with BP of 216/105 mmHg on presentation likely secondary to pain and anxiety  Patient's pressure improved to 179/84 mmHg  Pressure increased again to 197/96 mmHg  No evidence of end organ damage    Last Blood Pressure: 154/92 mmHg    Plan:  · PRN IV labetalol 10 mg q 6h for SBP > 180 mmHg  · Continue amlodipine 5 mg daily  · Monitor blood pressures and for adverse effects of antihypertensive

## 2022-04-02 NOTE — ASSESSMENT & PLAN NOTE
Recent Labs     03/31/22  1402 04/01/22  0539 04/02/22  0442   CREATININE 2 06* 1 64* 1 57*   EGFR 32 42 44     Estimated Creatinine Clearance: 56 1 mL/min (A) (by C-G formula based on SCr of 1 57 mg/dL (H))   POA  Unknown baseline  Cannot determine if YANELIS without baseline   UA: negative   Imaging: CT a/p completed outpatient - Moderate to severe bilateral hydroureteronephrosis as well as a distended bladder exhibiting an apex diverticulum all suggesting chronic bladder outlet obstruction related to prostatomegaly  Recommend prompt urologic consultation  Additionally, there is some irregular left mid to upper renal pole cortical enhancement evident for which infection is of concern and correlation with urinalysis is advised     Likely post renal due to urinary obstruction secondary to prostatomegaly, possible pre renal with recent gastrointestinal illness with diarrhea     Plan:   Maintain Reyes catheter   o Urology consulted; appreciate recommendations, see above   UA negative, patient afebrile, patient with mild leukocytosis x2 days, urine culture negative   Urinary retention protocol, Bladder scan, Daily weights, I/O   Monitor BMP daily and observe for downward trend of creatinine   Avoid hypoperfusion of the kidneys, minimize nephrotoxins   Encourage PO intake

## 2022-04-02 NOTE — ASSESSMENT & PLAN NOTE
Patient with abdominal pain x2 weeks, initially started with gastrointestinal upset/diarrhea  He and son were sick and presented to PCP  On physical exam, abdomen was more distended compared to baseline  Sent patient for outpatient CT a/p, results outlined below  Patient with history of BPH diagnosed > 10 years ago, patient has not taken medications for BPH or follow with urology  CT a/p 03/25: Moderate to severe bilateral hydroureteronephrosis as well as a distended bladder exhibiting an apex diverticulum all suggesting chronic bladder outlet obstruction related to prostatomegaly  Recommend prompt urologic consultation  Additionally, there is some irregular left mid to upper renal pole cortical enhancement evident for which infection is of concern and correlation with urinalysis is advised  Patient had abdominal pain and distention of abdomen on presentation  PVR was assessed and was 940 mL, patient urinated 220 mL, repeat PVR was > 999 mL  Nathan catheter was inserted in the ED  Patient with mild hematuria and small clots after nathan insertion, irrigated by nursing, no requirement for CBI  Ultrasound kidney and bladder 04/02: Bilateral hydronephrosis similar to prior CT given intermodality differences  Proximal ureters are dilated at the level of the kidneys, distally at the level of the bladder they are not identified  Nathan catheter decompresses the urinary bladder  Plan:  · Maintain Nathan catheter; urinary retention protocol  · Patient with mild leukocytosis x2 days, continue to monitor and follow pending urine culture  · Urology consulted; appreciate recommendations   · Discussed ultrasound findings and hematuria with urology  · Current recommendation is to follow up outpatient for trial of void and full BPH work up with potential cystoscopy/TRUS  · Continue tamsulosin 0 4 mg QDinner  · Daily weights and Is&Os  · Irrigate nathan catheter as needed for red-tinged/hematuria of urine in bag QID  Monitor for clots in bag

## 2022-04-02 NOTE — ASSESSMENT & PLAN NOTE
Possibly secondary to enlarged urinary bladder  Patient started on Miralax 17 g daily and Senokot QHS  Will increase Miralax to BID and monitor for bowel movement

## 2022-04-02 NOTE — PROGRESS NOTES
[facility]  Progress Note Darius Cruz 1955, 74 y o  male MRN: [id number]  Unit/Bed#: W -01 Encounter: [id number]  Primary Care Provider: Justin Sanders DO   Date and time admitted to hospital: 3/31/2022  1:33 PM    * Urinary (tract) obstruction  Assessment & Plan  Patient with abdominal pain x2 weeks, initially started with gastrointestinal upset/diarrhea  He and son were sick and presented to PCP  On physical exam, abdomen was more distended compared to baseline  Sent patient for outpatient CT a/p, results outlined below  Patient with history of BPH diagnosed > 10 years ago, patient has not taken medications for BPH or follow with urology  CT a/p 03/25: Moderate to severe bilateral hydroureteronephrosis as well as a distended bladder exhibiting an apex diverticulum all suggesting chronic bladder outlet obstruction related to prostatomegaly  Recommend prompt urologic consultation  Additionally, there is some irregular left mid to upper renal pole cortical enhancement evident for which infection is of concern and correlation with urinalysis is advised  Patient had abdominal pain and distention of abdomen on presentation  PVR was assessed and was 940 mL, patient urinated 220 mL, repeat PVR was > 999 mL  Nathan catheter was inserted in the ED  Patient with mild hematuria and small clots after nathan insertion, irrigated by nursing, no requirement for CBI  Ultrasound kidney and bladder 04/02: Bilateral hydronephrosis similar to prior CT given intermodality differences  Proximal ureters are dilated at the level of the kidneys, distally at the level of the bladder they are not identified  Nathan catheter decompresses the urinary bladder      Plan:  · Maintain Nathan catheter; urinary retention protocol  · Patient with mild leukocytosis x2 days, continue to monitor and follow pending urine culture  · Urology consulted; appreciate recommendations   · Discussed ultrasound findings and hematuria with urology  · Current recommendation is to follow up outpatient for trial of void and full BPH work up with potential cystoscopy/TRUS  · Continue tamsulosin 0 4 mg QDinner  · Daily weights and Is&Os  · Irrigate nathan catheter as needed for red-tinged/hematuria of urine in bag QID  Monitor for clots in bag  Elevated serum creatinine  Assessment & Plan  Recent Labs     03/31/22  1402 04/01/22  0539 04/02/22  0442   CREATININE 2 06* 1 64* 1 57*   EGFR 32 42 44     Estimated Creatinine Clearance: 56 1 mL/min (A) (by C-G formula based on SCr of 1 57 mg/dL (H))   POA  Unknown baseline  Cannot determine if YANELIS without baseline   UA: negative   Imaging: CT a/p completed outpatient - Moderate to severe bilateral hydroureteronephrosis as well as a distended bladder exhibiting an apex diverticulum all suggesting chronic bladder outlet obstruction related to prostatomegaly  Recommend prompt urologic consultation  Additionally, there is some irregular left mid to upper renal pole cortical enhancement evident for which infection is of concern and correlation with urinalysis is advised   Likely post renal due to urinary obstruction secondary to prostatomegaly, possible pre renal with recent gastrointestinal illness with diarrhea     Plan:   Maintain Nathan catheter   o Urology consulted; appreciate recommendations, see above   UA negative, patient afebrile, patient with mild leukocytosis x2 days, urine culture negative   Urinary retention protocol, Bladder scan, Daily weights, I/O   Monitor BMP daily and observe for downward trend of creatinine   Avoid hypoperfusion of the kidneys, minimize nephrotoxins   Encourage PO intake    Hydroureteronephrosis  Assessment & Plan  Evidence on CT a/p on 03/25/22  See urinary tract obstruction above  Hypertensive urgency  Assessment & Plan  Patient with BP of 216/105 mmHg on presentation likely secondary to pain and anxiety   Patient's pressure improved to 179/84 mmHg  Pressure increased again to 197/96 mmHg  No evidence of end organ damage  Last Blood Pressure: 154/92 mmHg    Plan:  · PRN IV labetalol 10 mg q 6h for SBP > 180 mmHg  · Continue amlodipine 5 mg daily  · Monitor blood pressures and for adverse effects of antihypertensive     Constipation  Assessment & Plan  Possibly secondary to enlarged urinary bladder  Patient started on Miralax 17 g daily and Senokot QHS  Will increase Miralax to BID and monitor for bowel movement  Hypokalemia  Assessment & Plan  Patient with hypokalemia this morning likely due secondary to post obstructive diuresis    Lab Results   Component Value Date    K 3 5 04/02/2022    K 3 4 (L) 04/01/2022     Plan:  · Resolved; continue to monitor and replete as needed  · Repleted with 20 mEq KDur 04/01  · Patient  Received plasmalyte fluid hydration at 100 mL/hr night of admission, transitioned to 45% normal saline at 75 mL/hr and stopped on 04/01      Varicose veins of both lower extremities  Assessment & Plan  Patient with history of varicose veins bilaterally  Patient has history of bleeding periodically if he hits against something or occasionally from showers  Management with pressure as needed and compression stockings daily  Plan:  · Continue compression stockings as patient wears at home  · Monitor for rupture/bleeding of veins      VTE Pharmacologic Prophylaxis: VTE Score: 4 Moderate Risk (Score 3-4) - Pharmacological DVT Prophylaxis Ordered: enoxaparin (Lovenox)  Patient Centered Rounds: I performed bedside rounds with nursing staff today  Discussions with Specialists or Other Care Team Provider: Nursing, Case management, Urology    Education and Discussions with Family / Patient: Will attempt to update patient's wife via phone after formal rounds  Current Length of Stay: 2 day(s)  Current Patient Status: Inpatient   Discharge Plan: Anticipate discharge in 48 hrs to home      Code Status: Level 1 - Full Code    Subjective:   Patient seen and evaluated at bedside  No acute events overnight  Per nursing, patient did not have a bowel movement after 1x Miralax yesterday, will increase to 2x daily  Patient denied chest pain, shortness of breath, nausea, vomiting, diarrhea  Patient denied discomfort from Reyes catheter at this time  Objective:     Vitals:   Temp (24hrs), Av 7 °F (36 5 °C), Min:97 3 °F (36 3 °C), Max:98 °F (36 7 °C)    Temp:  [97 3 °F (36 3 °C)-98 °F (36 7 °C)] 97 3 °F (36 3 °C)  HR:  [69-84] 83  Resp:  [18] 18  BP: (154-185)/() 160/90  SpO2:  [96 %-97 %] 97 %  Body mass index is 37 21 kg/m²  Input and Output Summary (last 24 hours): Intake/Output Summary (Last 24 hours) at 2022 1036  Last data filed at 2022 0910  Gross per 24 hour   Intake 2680 ml   Output 2700 ml   Net -20 ml       Physical Exam:   Physical Exam  Vitals reviewed  Constitutional:       Appearance: He is obese  HENT:      Head: Normocephalic and atraumatic  Comments: Poor dentition     Right Ear: External ear normal       Left Ear: External ear normal       Nose: Nose normal       Mouth/Throat:      Mouth: Mucous membranes are moist       Pharynx: Oropharynx is clear  Eyes:      General: No scleral icterus  Conjunctiva/sclera: Conjunctivae normal       Pupils: Pupils are equal, round, and reactive to light  Cardiovascular:      Rate and Rhythm: Normal rate and regular rhythm  Pulses: Normal pulses  Heart sounds: Normal heart sounds  Pulmonary:      Effort: Pulmonary effort is normal  No respiratory distress  Breath sounds: Normal breath sounds  No wheezing or rales  Abdominal:      General: Bowel sounds are normal  There is distension (Improved compared to prior)  Palpations: Abdomen is soft  Tenderness: There is no abdominal tenderness  There is no guarding     Genitourinary:     Comments: Reyes catheter draining clear urine at time of evaluation  Skin: General: Skin is warm and dry  Capillary Refill: Capillary refill takes less than 2 seconds  Coloration: Skin is not jaundiced  Comments: Varicose veins present on lower extremities bilaterally   Neurological:      Mental Status: He is alert and oriented to person, place, and time  Mental status is at baseline  Motor: No weakness  Psychiatric:         Mood and Affect: Mood normal          Behavior: Behavior normal           Additional Data:     Labs:  Results from last 7 days   Lab Units 04/02/22  0442 04/01/22  0539 03/31/22  1402   WBC Thousand/uL 10 61*   < > 8 71   HEMOGLOBIN g/dL 13 6   < > 13 4   HEMATOCRIT % 40 1   < > 39 9   PLATELETS Thousands/uL 203   < > 199   NEUTROS PCT %  --   --  75   LYMPHS PCT %  --   --  17   MONOS PCT %  --   --  6   EOS PCT %  --   --  1    < > = values in this interval not displayed       Results from last 7 days   Lab Units 04/02/22  0442   SODIUM mmol/L 137   POTASSIUM mmol/L 3 5   CHLORIDE mmol/L 102   CO2 mmol/L 26   BUN mg/dL 24   CREATININE mg/dL 1 57*   ANION GAP mmol/L 9   CALCIUM mg/dL 8 7   ALBUMIN g/dL 3 4*   TOTAL BILIRUBIN mg/dL 1 44*   ALK PHOS U/L 69   ALT U/L 18   AST U/L 12   GLUCOSE RANDOM mg/dL 138                       Lines/Drains:  Invasive Devices  Report    Peripheral Intravenous Line            Peripheral IV 03/31/22 Right Forearm 1 day          Drain            Urethral Catheter Double-lumen 16 Fr  1 day              Urinary Catheter:  Goal for removal: N/A- Discharging with Reyes               Imaging: Reviewed radiology reports from this admission including: abdominal/pelvic CT    Recent Cultures (last 7 days):   Results from last 7 days   Lab Units 04/01/22  0810   URINE CULTURE  No Growth <1000 cfu/mL       Last 24 Hours Medication List:   Current Facility-Administered Medications   Medication Dose Route Frequency Provider Last Rate    amLODIPine  5 mg Oral Daily Kaylee Capps DO      enoxaparin  40 mg Subcutaneous Daily Kaylee Capps DO      Labetalol HCl  10 mg Intravenous Q6H PRN Kaylee Capps DO      polyethylene glycol  17 g Oral BID Kaylee Capps DO      senna-docusate sodium  1 tablet Oral HS Kaylee Capps DO      tamsulosin  0 4 mg Oral Daily With TXU DO López          Today, Patient Was Seen By: Mahad Tariq DO    **Please Note: This note may have been constructed using a voice recognition system  **

## 2022-04-02 NOTE — PLAN OF CARE
Problem: Potential for Falls  Goal: Patient will remain free of falls  Description: INTERVENTIONS:  - Educate patient/family on patient safety including physical limitations  - Instruct patient to call for assistance with activity   - Consult OT/PT to assist with strengthening/mobility   - Keep Call bell within reach  - Keep bed low and locked with side rails adjusted as appropriate  - Keep care items and personal belongings within reach  - Initiate and maintain comfort rounds  - Make Fall Risk Sign visible to staff  - Offer Toileting every Hours, in advance of need  - Initiate/Maintain alarm  - Obtain necessary fall risk management equipment:   - Apply yellow socks and bracelet for high fall risk patients  - Consider moving patient to room near nurses station  Outcome: Progressing     Problem: PAIN - ADULT  Goal: Verbalizes/displays adequate comfort level or baseline comfort level  Description: Interventions:  - Encourage patient to monitor pain and request assistance  - Assess pain using appropriate pain scale  - Administer analgesics based on type and severity of pain and evaluate response  - Implement non-pharmacological measures as appropriate and evaluate response  - Consider cultural and social influences on pain and pain management  - Notify physician/advanced practitioner if interventions unsuccessful or patient reports new pain  Outcome: Progressing     Problem: INFECTION - ADULT  Goal: Absence or prevention of progression during hospitalization  Description: INTERVENTIONS:  - Assess and monitor for signs and symptoms of infection  - Monitor lab/diagnostic results  - Monitor all insertion sites, i e  indwelling lines, tubes, and drains  - Monitor endotracheal if appropriate and nasal secretions for changes in amount and color  - [place] appropriate cooling/warming therapies per order  - Administer medications as ordered  - Instruct and encourage patient and family to use good hand hygiene technique  - Identify and instruct in appropriate isolation precautions for identified infection/condition  Outcome: Progressing     Problem: DISCHARGE PLANNING  Goal: Discharge to home or other facility with appropriate resources  Description: INTERVENTIONS:  - Identify barriers to discharge w/patient and caregiver  - Arrange for needed discharge resources and transportation as appropriate  - Identify discharge learning needs (meds, wound care, etc )  - Arrange for interpretive services to assist at discharge as needed  - Refer to Case Management Department for coordinating discharge planning if the patient needs post-hospital services based on physician/advanced practitioner order or complex needs related to functional status, cognitive ability, or social support system  Outcome: Progressing     Problem: Knowledge Deficit  Goal: Patient/family/caregiver demonstrates understanding of disease process, treatment plan, medications, and discharge instructions  Description: Complete learning assessment and assess knowledge base    Interventions:  - Provide teaching at level of understanding  - Provide teaching via preferred learning methods  Outcome: Progressing

## 2022-04-02 NOTE — ASSESSMENT & PLAN NOTE
Patient with hypokalemia this morning likely due secondary to post obstructive diuresis    Lab Results   Component Value Date    K 3 5 04/02/2022    K 3 4 (L) 04/01/2022     Plan:  · Resolved; continue to monitor and replete as needed  · Repleted with 20 mEq KDur 04/01  · Patient  Received plasmalyte fluid hydration at 100 mL/hr night of admission, transitioned to 45% normal saline at 75 mL/hr and stopped on 04/01

## 2022-04-03 VITALS
HEART RATE: 71 BPM | HEIGHT: 69 IN | WEIGHT: 252 LBS | SYSTOLIC BLOOD PRESSURE: 156 MMHG | BODY MASS INDEX: 37.33 KG/M2 | DIASTOLIC BLOOD PRESSURE: 95 MMHG | OXYGEN SATURATION: 98 % | TEMPERATURE: 97.6 F | RESPIRATION RATE: 16 BRPM

## 2022-04-03 LAB
ALBUMIN SERPL BCP-MCNC: 3.5 G/DL (ref 3.5–5)
ALP SERPL-CCNC: 74 U/L (ref 46–116)
ALT SERPL W P-5'-P-CCNC: 17 U/L (ref 12–78)
ANION GAP SERPL CALCULATED.3IONS-SCNC: 10 MMOL/L (ref 4–13)
AST SERPL W P-5'-P-CCNC: 22 U/L (ref 5–45)
BILIRUB SERPL-MCNC: 1.18 MG/DL (ref 0.2–1)
BUN SERPL-MCNC: 23 MG/DL (ref 5–25)
CALCIUM SERPL-MCNC: 8.9 MG/DL (ref 8.3–10.1)
CHLORIDE SERPL-SCNC: 101 MMOL/L (ref 100–108)
CO2 SERPL-SCNC: 26 MMOL/L (ref 21–32)
CREAT SERPL-MCNC: 1.46 MG/DL (ref 0.6–1.3)
ERYTHROCYTE [DISTWIDTH] IN BLOOD BY AUTOMATED COUNT: 12.8 % (ref 11.6–15.1)
GFR SERPL CREATININE-BSD FRML MDRD: 48 ML/MIN/1.73SQ M
GLUCOSE SERPL-MCNC: 138 MG/DL (ref 65–140)
HCT VFR BLD AUTO: 42.3 % (ref 36.5–49.3)
HGB BLD-MCNC: 14.1 G/DL (ref 12–17)
MCH RBC QN AUTO: 29.6 PG (ref 26.8–34.3)
MCHC RBC AUTO-ENTMCNC: 33.3 G/DL (ref 31.4–37.4)
MCV RBC AUTO: 89 FL (ref 82–98)
PLATELET # BLD AUTO: 178 THOUSANDS/UL (ref 149–390)
PMV BLD AUTO: 11.4 FL (ref 8.9–12.7)
POTASSIUM SERPL-SCNC: 3.7 MMOL/L (ref 3.5–5.3)
PROT SERPL-MCNC: 7.9 G/DL (ref 6.4–8.2)
RBC # BLD AUTO: 4.77 MILLION/UL (ref 3.88–5.62)
SODIUM SERPL-SCNC: 137 MMOL/L (ref 136–145)
WBC # BLD AUTO: 9.36 THOUSAND/UL (ref 4.31–10.16)

## 2022-04-03 PROCEDURE — 99239 HOSP IP/OBS DSCHRG MGMT >30: CPT | Performed by: INTERNAL MEDICINE

## 2022-04-03 PROCEDURE — 80053 COMPREHEN METABOLIC PANEL: CPT

## 2022-04-03 PROCEDURE — 85027 COMPLETE CBC AUTOMATED: CPT

## 2022-04-03 RX ORDER — AMLODIPINE BESYLATE 5 MG/1
5 TABLET ORAL DAILY
Qty: 30 TABLET | Refills: 0 | Status: SHIPPED | OUTPATIENT
Start: 2022-04-04 | End: 2022-04-06 | Stop reason: SDUPTHER

## 2022-04-03 RX ORDER — TAMSULOSIN HYDROCHLORIDE 0.4 MG/1
0.4 CAPSULE ORAL
Qty: 30 CAPSULE | Refills: 0 | Status: SHIPPED | OUTPATIENT
Start: 2022-04-03 | End: 2022-04-29 | Stop reason: SDUPTHER

## 2022-04-03 RX ADMIN — AMLODIPINE BESYLATE 5 MG: 5 TABLET ORAL at 08:45

## 2022-04-03 RX ADMIN — ENOXAPARIN SODIUM 40 MG: 40 INJECTION SUBCUTANEOUS at 08:46

## 2022-04-03 NOTE — ASSESSMENT & PLAN NOTE
Recent Labs     04/01/22  0539 04/02/22  0442 04/03/22  0438   CREATININE 1 64* 1 57* 1 46*   EGFR 42 44 48     Estimated Creatinine Clearance: 60 3 mL/min (A) (by C-G formula based on SCr of 1 46 mg/dL (H))   POA  Unknown baseline  Cannot determine if YANELIS without baseline   UA: negative   Imaging: CT a/p completed outpatient - Moderate to severe bilateral hydroureteronephrosis as well as a distended bladder exhibiting an apex diverticulum all suggesting chronic bladder outlet obstruction related to prostatomegaly  Recommend prompt urologic consultation  Additionally, there is some irregular left mid to upper renal pole cortical enhancement evident for which infection is of concern and correlation with urinalysis is advised     Likely post renal due to urinary obstruction secondary to prostatomegaly, possible pre renal with recent gastrointestinal illness with diarrhea    Creatinine has been improving daily    Plan:   Maintain Reyes catheter   o Urology consulted; appreciate recommendations, see above   UA negative, patient afebrile, patient with mild leukocytosis x2 days before, resolved on 4/3/2022, urine culture negative   Recheck BMP outpatient   Avoid hypoperfusion of the kidneys, patient discharged on decrease regimen for his hypertension   Encourage PO intake

## 2022-04-03 NOTE — DISCHARGE SUMMARY
[facility]  Discharge- Britt Faganri 1955, 74 y o  male MRN: [id number]  Unit/Bed#: W -01 Encounter: [id number]  Primary Care Provider: Roney Bah DO   Date and time admitted to hospital: 3/31/2022  1:33 PM    * Urinary (tract) obstruction  Assessment & Plan  Patient with abdominal pain x2 weeks, initially started with gastrointestinal upset/diarrhea  He and son were sick and presented to PCP  On physical exam, abdomen was more distended compared to baseline  Sent patient for outpatient CT a/p, results outlined below  Patient with history of BPH diagnosed > 10 years ago, patient has not taken medications for BPH or follow with urology  CT a/p 03/25: Moderate to severe bilateral hydroureteronephrosis as well as a distended bladder exhibiting an apex diverticulum all suggesting chronic bladder outlet obstruction related to prostatomegaly  Recommend prompt urologic consultation  Additionally, there is some irregular left mid to upper renal pole cortical enhancement evident for which infection is of concern and correlation with urinalysis is advised  Patient had abdominal pain and distention of abdomen on presentation  PVR was assessed and was 940 mL, patient urinated 220 mL, repeat PVR was > 999 mL  Nathan catheter was inserted in the ED  Patient with mild hematuria and small clots after nathan insertion, irrigated by nursing, no requirement for CBI  Ultrasound kidney and bladder 04/02: Bilateral hydronephrosis similar to prior CT given intermodality differences  Proximal ureters are dilated at the level of the kidneys, distally at the level of the bladder they are not identified  Nathan catheter decompresses the urinary bladder      Plan:  · Maintain Nathan catheter; urinary retention protocol  · Patient with mild leukocytosis x2 days before, resolved on 4/3/2022, continue to monitor, urine culture from 4/1/2022 is negative   · Urology consulted; appreciate recommendations   · Discussed ultrasound findings and hematuria with urology  · Current recommendation is to follow up outpatient for trial of void in 10 days and full BPH work up with potential cystoscopy/TRUS  · Urology signed off, the patient is getting discharged today  · Continue tamsulosin 0 4 mg QDinner    Elevated serum creatinine  Assessment & Plan  Recent Labs     04/01/22  0539 04/02/22  0442 04/03/22  0438   CREATININE 1 64* 1 57* 1 46*   EGFR 42 44 48     Estimated Creatinine Clearance: 60 3 mL/min (A) (by C-G formula based on SCr of 1 46 mg/dL (H))   POA  Unknown baseline  Cannot determine if YANELIS without baseline   UA: negative   Imaging: CT a/p completed outpatient - Moderate to severe bilateral hydroureteronephrosis as well as a distended bladder exhibiting an apex diverticulum all suggesting chronic bladder outlet obstruction related to prostatomegaly  Recommend prompt urologic consultation  Additionally, there is some irregular left mid to upper renal pole cortical enhancement evident for which infection is of concern and correlation with urinalysis is advised   Likely post renal due to urinary obstruction secondary to prostatomegaly, possible pre renal with recent gastrointestinal illness with diarrhea    Creatinine has been improving daily    Plan:   Maintain Reyes catheter   o Urology consulted; appreciate recommendations, see above   UA negative, patient afebrile, patient with mild leukocytosis x2 days before, resolved on 4/3/2022, urine culture negative   Recheck BMP outpatient   Avoid hypoperfusion of the kidneys, patient discharged on decrease regimen for his hypertension   Encourage PO intake    Constipation  Assessment & Plan  Possibly secondary to enlarged urinary bladder  Patient started on Miralax 17 g daily and Senokot QHS    Miralax was increased to BID, the patient had a bowel movement this AM, is okay to be discharged today    Hypokalemia  Assessment & Plan  Patient with hypokalemia this morning likely due secondary to post obstructive diuresis    Lab Results   Component Value Date    K 3 7 04/03/2022    K 3 5 04/02/2022     Plan:  · Resolved;   · Recheck BMP outpatient      Varicose veins of both lower extremities  Assessment & Plan  Patient with history of varicose veins bilaterally  Patient has history of bleeding periodically if he hits against something or occasionally from showers  Management with pressure as needed and compression stockings daily  Plan:  · Continue compression stockings as patient wears at home        Hypertensive urgency  Assessment & Plan  Patient with BP of 216/105 mmHg on presentation likely secondary to pain and anxiety  Patient's pressure improved to 179/84 mmHg  Pressure increased again to 197/96 mmHg  No evidence of end organ damage  Last Blood Pressure: 156/95 mmHg    Plan:  · Continue amlodipine 5 mg daily  · Patient will follow-up with PCP regarding antihypertensive medications  Hydroureteronephrosis  Assessment & Plan  Evidence on CT a/p on 03/25/22  See urinary tract obstruction above  Medical Problems             Resolved Problems  Date Reviewed: 4/3/2022    None              Discharging Resident: Aashish Puckett MD  Discharging Attending: No att  providers found  PCP: Danny Kyle DO  Admission Date:   Admission Orders (From admission, onward)     Ordered        03/31/22 1616  Inpatient Admission  Once                      Discharge Date: 04/03/22    Consultations During Hospital Stay:  · Urology    Procedures Performed:   · Reyes placement    Significant Findings / Test Results:   US kidney and bladder   Final Result by Dot Barthel, DO (04/02 1108)      Bilateral hydronephrosis similar to prior CT given intermodality differences  Proximal ureters are dilated at the level of the kidneys, distally at the level of the bladder they are not identified        Reyes catheter decompresses the urinary bladder  Workstation performed: [id number]             Incidental Findings:   · None     Test Results Pending at Discharge (will require follow up): · None     Outpatient Tests Requested:  · BMP    Complications:  None    Reason for Admission:  Urinary tract obstruction    Hospital Course:   Luis Esqueda is a 74 y o  male patient who originally presented to the hospital on 3/31/2022 due to abdominal pain and distension  He has PMH of BPH diagnosed more than 10 years ago, history of epilepsy with last known seizure in his 19 is not currently on medication, but varicose veins  The patient was sent to the emergency department by PCP because of the CT scan of abdomen and pelvis findings and physical exam   He visited his PCP due to diarrhea that was obscuring for 2-3 days on 03/21, PCP noticed that his abdomen was having increase distension and palpated a mass in the left lower quadrant  He also was having abdominal pain so he was sent to get CT scan and the patient returned to PCP to review the results, UA was negative in the office and CT scan showed bilateral hydroureteronephrosis moderate to severe  So the patient was sent to the ED  His CT scan also showed suggestions for chronic bladder outlet obstruction related to prostatomegaly  In the ED, patient was having hypertensive urgency, initially more than 245 systolic blood pressure, however decreased without intervention when patient had Jenna Colder for pain and improvement in anxiousness about arriving to the hospital   His creatinine was increased to 2 06 but we did not have his baseline since it is unknown  Other lab findings have been in normal limits  UA in the ED was negative  PVR showed more than 1 L after urination  Urology was consulted, and Reyes catheter was placed  The patient had immediate relief of abdominal discomfort  Since then his creatinine has been trending down    Today urology signed off and they recommended discharging the patient was Reyes, with outpatient follow-up in the office of Urology  Patient was also started on Flomax  In the office, per Urology they might continue maintaining Reyes or the Might do PVR recheck and reassessment  Today the patient was excited about getting discharged  His aware that he is going to repeat BMP, to recheck his creatinine  Please see above list of diagnoses and related plan for additional information  Condition at Discharge: fair    Discharge Day Visit / Exam:   Subjective: The patient says that he feels well, he denies any abdominal pain or chest pain  He says that he does not have any complaints and feels well  He was delighted to hear that he was getting discharged today  Vitals: Blood Pressure: 156/95 (04/03/22 0710)  Pulse: 71 (04/03/22 0710)  Temperature: 97 6 °F (36 4 °C) (04/03/22 0710)  Temp Source: Oral (04/03/22 0710)  Respirations: 16 (04/03/22 0710)  Height: 5' 9" (175 3 cm) (03/31/22 1313)  Weight - Scale: 114 kg (252 lb) (03/31/22 1313)  SpO2: 98 % (04/03/22 0710)  Exam:   Physical Exam  Vitals reviewed  Constitutional:       Appearance: He is obese  HENT:      Head: Normocephalic and atraumatic  Comments: Poor dentition     Right Ear: External ear normal       Left Ear: External ear normal       Nose: Nose normal       Mouth/Throat:      Mouth: Mucous membranes are moist       Pharynx: Oropharynx is clear  Eyes:      General: No scleral icterus  Conjunctiva/sclera: Conjunctivae normal       Pupils: Pupils are equal, round, and reactive to light  Cardiovascular:      Rate and Rhythm: Normal rate and regular rhythm  Pulses: Normal pulses  Heart sounds: Normal heart sounds  Pulmonary:      Effort: Pulmonary effort is normal  No respiratory distress  Breath sounds: Normal breath sounds  No wheezing or rales  Abdominal:      General: Bowel sounds are normal  There is no distension (Improved)  Palpations: Abdomen is soft  Tenderness: There is no abdominal tenderness  There is no guarding  Genitourinary:     Comments: Reyes catheter still draining clear urine  Skin:     General: Skin is warm and dry  Capillary Refill: Capillary refill takes less than 2 seconds  Coloration: Skin is not jaundiced  Comments: Bilateral varicose veins   Neurological:      Mental Status: He is alert and oriented to person, place, and time  Mental status is at baseline  Motor: No weakness  Psychiatric:         Mood and Affect: Mood normal          Behavior: Behavior normal           Discussion with Family: Updated  (wife) at bedside  Discharge instructions/Information to patient and family:   See after visit summary for information provided to patient and family  Provisions for Follow-Up Care:  See after visit summary for information related to follow-up care and any pertinent home health orders  Disposition:   Other: Home with home services    Planned Readmission:  None    Discharge Medications:  See after visit summary for reconciled discharge medications provided to patient and/or family        **Please Note: This note may have been constructed using a voice recognition system**

## 2022-04-03 NOTE — PLAN OF CARE
Problem: Potential for Falls  Goal: Patient will remain free of falls  Description: INTERVENTIONS:  - Educate patient/family on patient safety including physical limitations  - Instruct patient to call for assistance with activity   - Consult OT/PT to assist with strengthening/mobility   - Keep Call bell within reach  - Keep bed low and locked with side rails adjusted as appropriate  - Keep care items and personal belongings within reach  - Initiate and maintain comfort rounds  4/3/2022 1336 by Agnes Gallagher  Outcome: Adequate for Discharge  4/3/2022 1336 by Agnes Aileen  Outcome: Progressing  4/3/2022 0712 by Agnes Aileen  Outcome: Progressing     Problem: PAIN - ADULT  Goal: Verbalizes/displays adequate comfort level or baseline comfort level  Description: Interventions:  - Encourage patient to monitor pain and request assistance  - Assess pain using appropriate pain scale  - Administer analgesics based on type and severity of pain and evaluate response  - Implement non-pharmacological measures as appropriate and evaluate response  - Consider cultural and social influences on pain and pain management  - Notify physician/advanced practitioner if interventions unsuccessful or patient reports new pain  4/3/2022 1336 by Agnes Gallagher  Outcome: Adequate for Discharge  4/3/2022 1336 by Agnes Aileen  Outcome: Progressing  4/3/2022 0712 by Secret Saleso  Outcome: Progressing     Problem: INFECTION - ADULT  Goal: Absence or prevention of progression during hospitalization  Description: INTERVENTIONS:  - Assess and monitor for signs and symptoms of infection  - Monitor lab/diagnostic results  - Monitor all insertion sites, i e  indwelling lines, tubes, and drains  - Monitor endotracheal if appropriate and nasal secretions for changes in amount and color  - [place] appropriate cooling/warming therapies per order  - Administer medications as ordered  - Instruct and encourage patient and family to use good hand hygiene technique  - Identify and instruct in appropriate isolation precautions for identified infection/condition  4/3/2022 1336 by Marcos Her  Outcome: Adequate for Discharge  4/3/2022 1336 by Marcos Her  Outcome: Progressing  4/3/2022 0712 by Marcos Her  Outcome: Progressing     Problem: DISCHARGE PLANNING  Goal: Discharge to home or other facility with appropriate resources  Description: INTERVENTIONS:  - Identify barriers to discharge w/patient and caregiver  - Arrange for needed discharge resources and transportation as appropriate  - Identify discharge learning needs (meds, wound care, etc )  - Arrange for interpretive services to assist at discharge as needed  - Refer to Case Management Department for coordinating discharge planning if the patient needs post-hospital services based on physician/advanced practitioner order or complex needs related to functional status, cognitive ability, or social support system  4/3/2022 1336 by Marcos Her  Outcome: Adequate for Discharge  4/3/2022 1336 by Marcos Her  Outcome: Progressing  4/3/2022 0712 by Marcos Her  Outcome: Progressing     Problem: Knowledge Deficit  Goal: Patient/family/caregiver demonstrates understanding of disease process, treatment plan, medications, and discharge instructions  Description: Complete learning assessment and assess knowledge base    Interventions:  - Provide teaching at level of understanding  - Provide teaching via preferred learning methods  4/3/2022 1336 by Winnie Richmond  Outcome: Adequate for Discharge  4/3/2022 1336 by Marcos Her  Outcome: Progressing  4/3/2022 0712 by Marcos Her  Outcome: Progressing

## 2022-04-03 NOTE — ASSESSMENT & PLAN NOTE
Possibly secondary to enlarged urinary bladder  Patient started on Miralax 17 g daily and Senokot QHS    Miralax was increased to BID, the patient had a bowel movement this AM, is okay to be discharged today

## 2022-04-03 NOTE — ASSESSMENT & PLAN NOTE
Patient with BP of 216/105 mmHg on presentation likely secondary to pain and anxiety  Patient's pressure improved to 179/84 mmHg  Pressure increased again to 197/96 mmHg  No evidence of end organ damage    Last Blood Pressure: 156/95 mmHg    Plan:  · PRN IV labetalol 10 mg q 6h for SBP > 180 mmHg  · Continue amlodipine 5 mg daily  · Monitor blood pressures and for adverse effects of antihypertensive

## 2022-04-03 NOTE — CASE MANAGEMENT
Case Management Progress Note    Patient name Shea Hudson  Location W /W [id number] MRN [id number]  : 1955 Date 4/3/2022       LOS (days): 3  Geometric Mean LOS (GMLOS) (days): 2 20  Days to GMLOS:-0 5        OBJECTIVE:        Current admission status: Inpatient  Preferred Pharmacy:   [place] [address] [address], [place] [facility] [id number]  Phone: [phone] Fax: [phone] [facility] ([address], [address] [facility] ([facility])  [address] [address] [place] [address] [id number])  [place] [address]  Phone: [phone] Fax: [phone]    Primary Care Provider: Margarita Sauceda DO    Primary Insurance: MEDICARE  Secondary Insurance: A[employer]    PROGRESS NOTE:    CM confirmed [facility] and placed fax # in comm mgmt, FU on d/c

## 2022-04-03 NOTE — ASSESSMENT & PLAN NOTE
Patient with hypokalemia this morning likely due secondary to post obstructive diuresis    Lab Results   Component Value Date    K 3 7 04/03/2022    K 3 5 04/02/2022     Plan:  · Resolved;   · Recheck BMP outpatient

## 2022-04-03 NOTE — ASSESSMENT & PLAN NOTE
Patient with BP of 216/105 mmHg on presentation likely secondary to pain and anxiety  Patient's pressure improved to 179/84 mmHg  Pressure increased again to 197/96 mmHg  No evidence of end organ damage  Last Blood Pressure: 156/95 mmHg    Plan:  · Continue amlodipine 5 mg daily  · Patient will follow-up with PCP regarding antihypertensive medications

## 2022-04-03 NOTE — DISCHARGE INSTR - AVS FIRST PAGE
Dear Teresia Kussmaul,     It was our pleasure to care for you here at [facility]  It is our hope that we were always able to exceed the expected standards for your care during your stay  You were hospitalized due to Urinary retention and kidney injury  You were cared for on the [facility] floor by Luis Enrique Ochoa MD under the service of Isac Chopra MD with the [facility] who covers for your primary care physician (PCP), Juan Carlos Dent DO, while you were hospitalized  If you have any questions or concerns related to this hospitalization, you may contact us at [phone]  For follow up as well as any medication refills, we recommend that you follow up with your primary care physician  A registered nurse will reach out to you by phone within a few days after your discharge to answer any additional questions that you may have after going home  However, at this time we provide for you here, the most important instructions / recommendations at discharge:     Notable Medication Adjustments -   Take 5 mg Norvasc daily for blood pressure  Take Flomax every day 0 4 mg to help with obstruction  Testing Required after Discharge -   BMP (blood test) to recheck creatinine (kidney function)  Important follow up information -   Please follow up with your PCP  Please follow up with Urology in 10 days, make sure to get your blood test done before going to Urology appointment  Other Instructions -   Try to limit the amount of protein and salt  Please review this entire after visit summary as additional general instructions including medication list, appointments, activity, diet, any pertinent wound care, and other additional recommendations from your care team that may be provided for you        Sincerely,     Luis Enrique Ochoa MD

## 2022-04-03 NOTE — ASSESSMENT & PLAN NOTE
Patient with history of varicose veins bilaterally  Patient has history of bleeding periodically if he hits against something or occasionally from showers  Management with pressure as needed and compression stockings daily      Plan:  · Continue compression stockings as patient wears at home
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
## 2022-04-03 NOTE — ASSESSMENT & PLAN NOTE
Patient with abdominal pain x2 weeks, initially started with gastrointestinal upset/diarrhea  He and son were sick and presented to PCP  On physical exam, abdomen was more distended compared to baseline  Sent patient for outpatient CT a/p, results outlined below  Patient with history of BPH diagnosed > 10 years ago, patient has not taken medications for BPH or follow with urology  CT a/p 03/25: Moderate to severe bilateral hydroureteronephrosis as well as a distended bladder exhibiting an apex diverticulum all suggesting chronic bladder outlet obstruction related to prostatomegaly  Recommend prompt urologic consultation  Additionally, there is some irregular left mid to upper renal pole cortical enhancement evident for which infection is of concern and correlation with urinalysis is advised  Patient had abdominal pain and distention of abdomen on presentation  PVR was assessed and was 940 mL, patient urinated 220 mL, repeat PVR was > 999 mL  Nathan catheter was inserted in the ED  Patient with mild hematuria and small clots after nathan insertion, irrigated by nursing, no requirement for CBI  Ultrasound kidney and bladder 04/02: Bilateral hydronephrosis similar to prior CT given intermodality differences  Proximal ureters are dilated at the level of the kidneys, distally at the level of the bladder they are not identified  Nathan catheter decompresses the urinary bladder      Plan:  · Maintain Nathan catheter; urinary retention protocol  · Patient with mild leukocytosis x2 days before, resolved on 4/3/2022, continue to monitor, urine culture from 4/1/2022 is negative   · Urology consulted; appreciate recommendations   · Discussed ultrasound findings and hematuria with urology  · Current recommendation is to follow up outpatient for trial of void in 10 days and full BPH work up with potential cystoscopy/TRUS  · Urology signed off  · Continue tamsulosin 0 4 mg QDinner  · Daily weights and Is&Os  · Irrigate nathan catheter as needed for red-tinged/hematuria of urine in bag QID  Monitor for clots in bag

## 2022-04-03 NOTE — PLAN OF CARE
Problem: Potential for Falls  Goal: Patient will remain free of falls  Description: INTERVENTIONS:  - Educate patient/family on patient safety including physical limitations  - Instruct patient to call for assistance with activity   - Consult OT/PT to assist with strengthening/mobility   - Keep Call bell within reach  - Keep bed low and locked with side rails adjusted as appropriate  - Keep care items and personal belongings within reach  - Initiate and maintain comfort rounds  Outcome: Progressing     Problem: PAIN - ADULT  Goal: Verbalizes/displays adequate comfort level or baseline comfort level  Description: Interventions:  - Encourage patient to monitor pain and request assistance  - Assess pain using appropriate pain scale  - Administer analgesics based on type and severity of pain and evaluate response  - Implement non-pharmacological measures as appropriate and evaluate response  - Consider cultural and social influences on pain and pain management  - Notify physician/advanced practitioner if interventions unsuccessful or patient reports new pain  Outcome: Progressing     Problem: INFECTION - ADULT  Goal: Absence or prevention of progression during hospitalization  Description: INTERVENTIONS:  - Assess and monitor for signs and symptoms of infection  - Monitor lab/diagnostic results  - Monitor all insertion sites, i e  indwelling lines, tubes, and drains  - Monitor endotracheal if appropriate and nasal secretions for changes in amount and color  - [place] appropriate cooling/warming therapies per order  - Administer medications as ordered  - Instruct and encourage patient and family to use good hand hygiene technique  - Identify and instruct in appropriate isolation precautions for identified infection/condition  Outcome: Progressing     Problem: DISCHARGE PLANNING  Goal: Discharge to home or other facility with appropriate resources  Description: INTERVENTIONS:  - Identify barriers to discharge w/patient and caregiver  - Arrange for needed discharge resources and transportation as appropriate  - Identify discharge learning needs (meds, wound care, etc )  - Arrange for interpretive services to assist at discharge as needed  - Refer to Case Management Department for coordinating discharge planning if the patient needs post-hospital services based on physician/advanced practitioner order or complex needs related to functional status, cognitive ability, or social support system  Outcome: Progressing     Problem: Knowledge Deficit  Goal: Patient/family/caregiver demonstrates understanding of disease process, treatment plan, medications, and discharge instructions  Description: Complete learning assessment and assess knowledge base    Interventions:  - Provide teaching at level of understanding  - Provide teaching via preferred learning methods  Outcome: Progressing

## 2022-04-03 NOTE — ASSESSMENT & PLAN NOTE
Patient with hypokalemia this morning likely due secondary to post obstructive diuresis    Lab Results   Component Value Date    K 3 7 04/03/2022    K 3 5 04/02/2022     Plan:  · Resolved; continue to monitor and replete as needed  · Repleted with 20 mEq KDur 04/01  · Patient  Received plasmalyte fluid hydration at 100 mL/hr night of admission, transitioned to 45% normal saline at 75 mL/hr and stopped on 04/01

## 2022-04-03 NOTE — DISCHARGE INSTRUCTIONS
Acute Kidney Injury   WHAT YOU NEED TO KNOW:   Acute kidney injury (YANELIS) is also called acute kidney failure, or acute renal failure  YANELIS happens when your kidneys suddenly stop working correctly  Normally, the kidneys remove fluid, chemicals, and waste from your blood  These wastes are turned into urine by your kidneys  YANELIS usually happens over hours or days  When you have YANELIS, your kidneys do not remove the waste, chemicals, or extra fluid from your body  A normal amount of urine is not produced  YANELIS is usually temporary, it can take days to months to recover  YANELIS can also become a chronic kidney condition  DISCHARGE INSTRUCTIONS:   Call 911 if:   · You have sudden chest pain or trouble breathing  Seek care immediately if:   · Your symptoms get worse  Contact your healthcare provider if:   · Your symptoms return  · Your blood sugar or blood pressure level is not within the range your healthcare provider recommends  · You have questions or concerns about your condition or care  Nutrition:  Your healthcare provider may tell you to eat food low in sodium (salt), potassium, phosphorus, or protein  A dietitian can help you plan your meals  Drink liquids as directed: Your healthcare provider may recommend that you drink a certain amount of liquids  This will help your kidneys work better and decrease your risk for dehydration  Ask how much liquid to drink each day and which liquids are best for you  Prevent acute kidney injury:   · Manage other health conditions  such as diabetes, high blood pressure, or heart disease  These conditions increase your risk for acute kidney injury  Take your medicines for these conditions as directed  Also, monitor your blood sugar and blood pressure levels as directed  Contact your healthcare provider if your levels are not in the range he or she says it should be  · Talk to your healthcare provider before you take over-the-counter-medicine    NSAIDs, stomach medicine, or laxatives may harm your kidneys and increase your risk for acute kidney injury  If it is okay to take the medicine, follow the directions on the package  Do not take more than directed  · Tell healthcare providers you have had acute kidney injury  before you get contrast liquid for an x-ray or CT scan  Your healthcare provider may give you medicine to prevent kidney problems caused by the liquid  Follow up with your healthcare provider as directed: You will need to return for more tests to make sure your kidneys are working properly  You may also be referred to a kidney specialist  Write down your questions so you remember to ask them during your visits  © Copyright [employer] 2022 Information is for End User's use only and may not be sold, redistributed or otherwise used for commercial purposes  All illustrations and images included in CareNotes® are the copyrighted property of [employer]  or [employer]  The above information is an  only  It is not intended as medical advice for individual conditions or treatments  Talk to your doctor, nurse or pharmacist before following any medical regimen to see if it is safe and effective for you

## 2022-04-03 NOTE — ASSESSMENT & PLAN NOTE
Recent Labs     04/01/22  0539 04/02/22  0442 04/03/22  0438   CREATININE 1 64* 1 57* 1 46*   EGFR 42 44 48     Estimated Creatinine Clearance: 60 3 mL/min (A) (by C-G formula based on SCr of 1 46 mg/dL (H))   POA  Unknown baseline  Cannot determine if YANELIS without baseline   UA: negative   Imaging: CT a/p completed outpatient - Moderate to severe bilateral hydroureteronephrosis as well as a distended bladder exhibiting an apex diverticulum all suggesting chronic bladder outlet obstruction related to prostatomegaly  Recommend prompt urologic consultation  Additionally, there is some irregular left mid to upper renal pole cortical enhancement evident for which infection is of concern and correlation with urinalysis is advised     Likely post renal due to urinary obstruction secondary to prostatomegaly, possible pre renal with recent gastrointestinal illness with diarrhea    Creatinine has been improving daily    Plan:   Maintain Reyes catheter   o Urology consulted; appreciate recommendations, see above   UA negative, patient afebrile, patient with mild leukocytosis x2 days before, resolved on 4/3/2022, urine culture negative   Urinary retention protocol, Bladder scan, Daily weights, I/O   Monitor BMP daily and observe for downward trend of creatinine   Avoid hypoperfusion of the kidneys, minimize nephrotoxins   Encourage PO intake

## 2022-04-03 NOTE — ASSESSMENT & PLAN NOTE
Possibly secondary to enlarged urinary bladder  Patient started on Miralax 17 g daily and Senokot QHS  Miralax was increased to BID, the patient had a bowel movement this AM  Monitor for bowel movement

## 2022-04-03 NOTE — ASSESSMENT & PLAN NOTE
Patient with abdominal pain x2 weeks, initially started with gastrointestinal upset/diarrhea  He and son were sick and presented to PCP  On physical exam, abdomen was more distended compared to baseline  Sent patient for outpatient CT a/p, results outlined below  Patient with history of BPH diagnosed > 10 years ago, patient has not taken medications for BPH or follow with urology  CT a/p 03/25: Moderate to severe bilateral hydroureteronephrosis as well as a distended bladder exhibiting an apex diverticulum all suggesting chronic bladder outlet obstruction related to prostatomegaly  Recommend prompt urologic consultation  Additionally, there is some irregular left mid to upper renal pole cortical enhancement evident for which infection is of concern and correlation with urinalysis is advised  Patient had abdominal pain and distention of abdomen on presentation  PVR was assessed and was 940 mL, patient urinated 220 mL, repeat PVR was > 999 mL  Nathan catheter was inserted in the ED  Patient with mild hematuria and small clots after nathan insertion, irrigated by nursing, no requirement for CBI  Ultrasound kidney and bladder 04/02: Bilateral hydronephrosis similar to prior CT given intermodality differences  Proximal ureters are dilated at the level of the kidneys, distally at the level of the bladder they are not identified  Nathan catheter decompresses the urinary bladder      Plan:  · Maintain Nathan catheter; urinary retention protocol  · Patient with mild leukocytosis x2 days before, resolved on 4/3/2022, continue to monitor, urine culture from 4/1/2022 is negative   · Urology consulted; appreciate recommendations   · Discussed ultrasound findings and hematuria with urology  · Current recommendation is to follow up outpatient for trial of void in 10 days and full BPH work up with potential cystoscopy/TRUS  · Urology signed off, the patient is getting discharged today  · Continue tamsulosin 0 4 mg QDinner

## 2022-04-04 ENCOUNTER — TRANSITIONAL CARE MANAGEMENT (OUTPATIENT)
Dept: FAMILY MEDICINE CLINIC | Facility: CLINIC | Age: 67
End: 2022-04-04

## 2022-04-05 PROBLEM — I16.0 HYPERTENSIVE URGENCY: Status: RESOLVED | Noted: 2022-03-31 | Resolved: 2022-04-05

## 2022-04-06 ENCOUNTER — OFFICE VISIT (OUTPATIENT)
Dept: FAMILY MEDICINE CLINIC | Facility: CLINIC | Age: 67
End: 2022-04-06
Payer: MEDICARE

## 2022-04-06 VITALS
WEIGHT: 250 LBS | RESPIRATION RATE: 16 BRPM | HEIGHT: 69 IN | OXYGEN SATURATION: 97 % | SYSTOLIC BLOOD PRESSURE: 140 MMHG | TEMPERATURE: 98 F | BODY MASS INDEX: 37.03 KG/M2 | HEART RATE: 92 BPM | DIASTOLIC BLOOD PRESSURE: 80 MMHG

## 2022-04-06 DIAGNOSIS — N13.30 HYDROURETERONEPHROSIS: ICD-10-CM

## 2022-04-06 DIAGNOSIS — N13.9 URINARY (TRACT) OBSTRUCTION: ICD-10-CM

## 2022-04-06 DIAGNOSIS — E66.9 OBESITY (BMI 30-39.9): ICD-10-CM

## 2022-04-06 DIAGNOSIS — E66.01 OBESITY, MORBID (HCC): ICD-10-CM

## 2022-04-06 DIAGNOSIS — I10 PRIMARY HYPERTENSION: Primary | ICD-10-CM

## 2022-04-06 DIAGNOSIS — R79.89 ELEVATED SERUM CREATININE: ICD-10-CM

## 2022-04-06 PROBLEM — E87.6 HYPOKALEMIA: Status: RESOLVED | Noted: 2022-04-01 | Resolved: 2022-04-06

## 2022-04-06 PROCEDURE — 99496 TRANSJ CARE MGMT HIGH F2F 7D: CPT | Performed by: FAMILY MEDICINE

## 2022-04-06 RX ORDER — AMLODIPINE BESYLATE 10 MG/1
10 TABLET ORAL DAILY
Qty: 90 TABLET | Refills: 1 | Status: SHIPPED | OUTPATIENT
Start: 2022-04-06 | End: 2022-07-22 | Stop reason: SDUPTHER

## 2022-04-06 NOTE — PATIENT INSTRUCTIONS
To better control your blood pressure to a resting goal of <140/90, please increase the amlodipine from 5mg once a day to 10mg once a day

## 2022-04-06 NOTE — PROGRESS NOTES
Assessment/Plan:    No problem-specific Assessment & Plan notes found for this encounter     htn not at goal  Increase amlodipine 5mg/d to 10mg/d  Recheck in 2w  Avoid nsaids    Elevated creatinine  Some improvement  Avoid dehydration  Baseline? Hydroureteronephrosis, urology f/u, not much change in hospital, no pain    Bph, now on flomax, tolerating    Obesity unchanged     Diagnoses and all orders for this visit:    Primary hypertension  -     amLODIPine (NORVASC) 10 mg tablet; Take 1 tablet (10 mg total) by mouth daily    Elevated serum creatinine    Hydroureteronephrosis    Obesity, morbid (HCC)    Urinary (tract) obstruction    Obesity (BMI 30-39  9)    BMI 36 0-36 9,adult       Return in about 2 weeks (around 4/20/2022)  Subjective:      Patient ID: Madeline Watson is a 74 y o  male  Chief Complaint   Patient presents with    Transition of Care Management     [facility]  TCM Call (since 3/6/2022)     Date and time call was made  4/4/2022  9:37 AM    Hospital care reviewed  Records reviewed        Patient was hospitialized at  [address] [place] [address]        Date of Admission  03/31/22    Date of discharge  04/03/22    Diagnosis  Urinary (tract) obstruction    Disposition  Home    Were the patients medications reviewed and updated  Yes    Current Symptoms  None      TCM Call (since 3/6/2022)     Post hospital issues  None    Should patient be enrolled in anticoag monitoring? No    Scheduled for follow up? Yes    Patients specialists  Urologist    Urologist name  [facility] (Urology) in 10 days (4/12/2022);  Service will contact     Did you obtain your prescribed medications  Yes    Do you need help managing your prescriptions or medications  No    Is transportation to your appointment needed  Yes    Specify why  He does not drive    I have advised the patient to call PCP with any new or worsening symptoms  [address] or [employer] other Support System  Family    The type of support provided  Physical; Emotional    Do you have social support  Yes, as much as I need    Are you recieving any outpatient services  No    Are you recieving home care services  Yes    Types of home care services  Nurse visit    Interperter language line needed  No    Counseling  Patient; Family    Counseling topics  Activities of daily living; Importance of RX compliance; patient and family education; instructions for management; Risk factor reduction; Home health agency benefits    Comments  I spoke with both Juan Griffin and his wife on 4/4/22  He states that he is doing fine  He has the indwelling catheter intact and visiting nurses will be calling to set up a home care visit  They know to report any fevers or any uncontrolled pain, catheter problems etc to us or to [facility]          Has nathan with bag  No blood or cloudy urine or pressure sensation  No fever  Saw uro and nephro  Has f/u for voiding trial  Bmp pending to reassess creatinine if CKD    htn new, on amlodipine 5mg/d  No edema    Can do more with diet    The following portions of the patient's history were reviewed and updated as appropriate: allergies, current medications, past family history, past medical history, past social history, past surgical history and problem list     Review of Systems   Constitutional: Negative for chills and fever  Gastrointestinal: Negative for abdominal distention and abdominal pain  Current Outpatient Medications   Medication Sig Dispense Refill    amLODIPine (NORVASC) 10 mg tablet Take 1 tablet (10 mg total) by mouth daily 90 tablet 1    Multiple Vitamin (MULTIVITAMINS PO) Take by mouth Daily      tamsulosin (FLOMAX) 0 4 mg Take 1 capsule (0 4 mg total) by mouth daily with dinner 30 capsule 0     No current facility-administered medications for this visit         Objective:    /80   Pulse 92   Temp 98 °F (36 7 °C)   Resp 16   Ht 5' 9" (1 753 m)   Wt 113 kg (250 lb)   SpO2 97%   BMI 36 92 kg/m²        Physical Exam  Vitals and nursing note reviewed  Constitutional:       General: He is not in acute distress  Appearance: He is well-developed  He is obese  He is not ill-appearing  HENT:      Head: Normocephalic  Right Ear: Tympanic membrane and ear canal normal       Left Ear: Tympanic membrane and ear canal normal       Mouth/Throat:      Pharynx: No oropharyngeal exudate  Eyes:      General: No scleral icterus  Conjunctiva/sclera: Conjunctivae normal    Cardiovascular:      Rate and Rhythm: Normal rate and regular rhythm  Pulmonary:      Effort: Pulmonary effort is normal  No respiratory distress  Breath sounds: No wheezing  Abdominal:      Palpations: Abdomen is soft  Tenderness: There is no abdominal tenderness  There is no right CVA tenderness or left CVA tenderness  Musculoskeletal:         General: No deformity  Cervical back: Neck supple  No rigidity  Skin:     General: Skin is warm and dry  Coloration: Skin is not jaundiced or pale  Neurological:      Mental Status: He is alert  Motor: No weakness  Gait: Gait normal    Psychiatric:         Mood and Affect: Mood normal          Behavior: Behavior normal          Thought Content:  Thought content normal                 Tatiana Goodpasture,

## 2022-04-08 ENCOUNTER — APPOINTMENT (OUTPATIENT)
Dept: LAB | Facility: HOSPITAL | Age: 67
End: 2022-04-08
Attending: FAMILY MEDICINE
Payer: MEDICARE

## 2022-04-08 DIAGNOSIS — N17.9 AKI (ACUTE KIDNEY INJURY) (HCC): ICD-10-CM

## 2022-04-08 DIAGNOSIS — Z13.6 SCREENING FOR CARDIOVASCULAR, RESPIRATORY, AND GENITOURINARY DISEASES: ICD-10-CM

## 2022-04-08 DIAGNOSIS — Z13.89 SCREENING FOR CARDIOVASCULAR, RESPIRATORY, AND GENITOURINARY DISEASES: ICD-10-CM

## 2022-04-08 DIAGNOSIS — Z13.1 SCREENING FOR DIABETES MELLITUS (DM): ICD-10-CM

## 2022-04-08 DIAGNOSIS — Z11.59 NEED FOR HEPATITIS C SCREENING TEST: ICD-10-CM

## 2022-04-08 DIAGNOSIS — R79.89 ELEVATED SERUM CREATININE: ICD-10-CM

## 2022-04-08 DIAGNOSIS — Z12.5 PROSTATE CANCER SCREENING: ICD-10-CM

## 2022-04-08 DIAGNOSIS — Z13.83 SCREENING FOR CARDIOVASCULAR, RESPIRATORY, AND GENITOURINARY DISEASES: ICD-10-CM

## 2022-04-08 LAB
ALBUMIN SERPL BCP-MCNC: 3.6 G/DL (ref 3.5–5)
ALP SERPL-CCNC: 81 U/L (ref 46–116)
ALT SERPL W P-5'-P-CCNC: 19 U/L (ref 12–78)
ANION GAP SERPL CALCULATED.3IONS-SCNC: 11 MMOL/L (ref 4–13)
AST SERPL W P-5'-P-CCNC: 13 U/L (ref 5–45)
BILIRUB SERPL-MCNC: 0.97 MG/DL (ref 0.2–1)
BUN SERPL-MCNC: 18 MG/DL (ref 5–25)
CALCIUM SERPL-MCNC: 9.2 MG/DL (ref 8.3–10.1)
CHLORIDE SERPL-SCNC: 99 MMOL/L (ref 100–108)
CHOLEST SERPL-MCNC: 174 MG/DL
CO2 SERPL-SCNC: 28 MMOL/L (ref 21–32)
CREAT SERPL-MCNC: 1.34 MG/DL (ref 0.6–1.3)
GFR SERPL CREATININE-BSD FRML MDRD: 54 ML/MIN/1.73SQ M
GLUCOSE P FAST SERPL-MCNC: 137 MG/DL (ref 65–99)
HCV AB SER QL: NORMAL
HDLC SERPL-MCNC: 50 MG/DL
LDLC SERPL CALC-MCNC: 112 MG/DL (ref 0–100)
POTASSIUM SERPL-SCNC: 4.4 MMOL/L (ref 3.5–5.3)
PROT SERPL-MCNC: 7.8 G/DL (ref 6.4–8.2)
PSA SERPL-MCNC: 2.7 NG/ML (ref 0–4)
SODIUM SERPL-SCNC: 138 MMOL/L (ref 136–145)
TRIGL SERPL-MCNC: 62 MG/DL

## 2022-04-08 PROCEDURE — 80061 LIPID PANEL: CPT

## 2022-04-08 PROCEDURE — 80053 COMPREHEN METABOLIC PANEL: CPT

## 2022-04-08 PROCEDURE — 86803 HEPATITIS C AB TEST: CPT

## 2022-04-08 PROCEDURE — 36415 COLL VENOUS BLD VENIPUNCTURE: CPT

## 2022-04-08 PROCEDURE — G0103 PSA SCREENING: HCPCS

## 2022-04-15 ENCOUNTER — PROCEDURE VISIT (OUTPATIENT)
Dept: UROLOGY | Facility: CLINIC | Age: 67
End: 2022-04-15
Payer: MEDICARE

## 2022-04-15 VITALS
WEIGHT: 248 LBS | BODY MASS INDEX: 36.73 KG/M2 | DIASTOLIC BLOOD PRESSURE: 98 MMHG | RESPIRATION RATE: 18 BRPM | HEIGHT: 69 IN | SYSTOLIC BLOOD PRESSURE: 186 MMHG

## 2022-04-15 DIAGNOSIS — N40.1 BPH WITH OBSTRUCTION/LOWER URINARY TRACT SYMPTOMS: Primary | ICD-10-CM

## 2022-04-15 DIAGNOSIS — N17.9 ACUTE KIDNEY INJURY (HCC): ICD-10-CM

## 2022-04-15 DIAGNOSIS — N13.8 BPH WITH OBSTRUCTION/LOWER URINARY TRACT SYMPTOMS: Primary | ICD-10-CM

## 2022-04-15 LAB — POST-VOID RESIDUAL VOLUME, ML POC: 587 ML

## 2022-04-15 PROCEDURE — 51798 US URINE CAPACITY MEASURE: CPT

## 2022-04-15 PROCEDURE — 51702 INSERT TEMP BLADDER CATH: CPT

## 2022-04-15 NOTE — PROGRESS NOTES
4/15/2022    Brittany Gaspar Dilts  1955  [id number]    Diagnosis  Chief Complaint     Urinary Retention          Patient presents for nathan removal and void trial s/p acute large volume retention, b/l hydro and YANELIS managed by our office    Plan  Patient will keep catheter in place until cysto/TRUS appt  He will follow up for catheter change appt in May and then cysto/TRUS in June as scheduled  Procedure Nathan removal/voiding trial    Nathan catheter removed after deflation of an intact balloon  Patient tolerated well  Encouraged patient to hydrate well and return this afternoon for post void residual   he knows he may return early if uncomfortable and unable to urinate  Patient agrees to this plan  Patient returned this afternoon  Patient states able to void, but only small amounts  Patient voided 'dribble' while in office  Bladder ultrasound performed and PVR measured 587ml  Decision made to replace catheter  Discussed with Carmela ARITA in office  Due to clinical picture, patient should keep nathan in place until cysto/TRUS  Patient agreeable to plan  Recent Results (from the past 4 hour(s))   POCT Measure PVR    Collection Time: 04/15/22  2:25 PM   Result Value Ref Range    POST-VOID RESIDUAL VOLUME, ML  mL     Universal Protocol:  Consent: Verbal consent obtained    Risks and benefits: risks, benefits and alternatives were discussed  Consent given by: patient  Patient understanding: patient states understanding of the procedure being performed  Patient consent: the patient's understanding of the procedure matches consent given  Procedure consent: procedure consent matches procedure scheduled  Patient identity confirmed: verbally with patient      Bladder catheterization    Date/Time: 4/15/2022 2:15 PM  Performed by: Wesly Mayorga RN  Authorized by: Franklin Renee MD     Patient location:  Bedside  Consent:     Consent given by:  Patient  Universal protocol:     Procedure explained and questions answered to patient or proxy's satisfaction: yes      Patient identity confirmed:  Verbally with patient  Pre-procedure details:     Procedure purpose:  Therapeutic  Anesthesia (see MAR for exact dosages): Anesthesia method:  None  Procedure details:     Catheter insertion:  Indwelling    Approach: natural orifice      Catheter type:  Coude, Reyes and latex    Catheter size:  16 Fr    Number of attempts:  1    Successful placement: yes      Urine characteristics:  Clear and yellow  Post-procedure details:     Patient tolerance of procedure: Tolerated well, no immediate complications  Comments:      Bladder drained for 900ml clear yellow urine  Balloon inflated with 10mL sterile water  Reyes attached to leg drainage bag  I reviewed with patient proper catheter care, how to switch catheter bags and how to use plug  He was provided with additional supplies as well             Vitals:    04/15/22 0802   BP: (!) 186/98   Resp: 18   Weight: 112 kg (248 lb)   Height: 5' 9" (1 753 m)           Shabnam Boss RN BSN

## 2022-04-15 NOTE — PROGRESS NOTES
I supervised the Advanced Practitioner  I reviewed the Advanced Practitioner note and agree      Phil Black MD 04/15/22

## 2022-04-20 ENCOUNTER — APPOINTMENT (OUTPATIENT)
Dept: LAB | Facility: CLINIC | Age: 67
End: 2022-04-20
Payer: MEDICARE

## 2022-04-20 DIAGNOSIS — N17.9 ACUTE KIDNEY INJURY (HCC): ICD-10-CM

## 2022-04-20 LAB
ANION GAP SERPL CALCULATED.3IONS-SCNC: 5 MMOL/L (ref 4–13)
BUN SERPL-MCNC: 20 MG/DL (ref 5–25)
CALCIUM SERPL-MCNC: 9.6 MG/DL (ref 8.3–10.1)
CHLORIDE SERPL-SCNC: 100 MMOL/L (ref 100–108)
CO2 SERPL-SCNC: 27 MMOL/L (ref 21–32)
CREAT SERPL-MCNC: 1.29 MG/DL (ref 0.6–1.3)
GFR SERPL CREATININE-BSD FRML MDRD: 56 ML/MIN/1.73SQ M
GLUCOSE SERPL-MCNC: 134 MG/DL (ref 65–140)
POTASSIUM SERPL-SCNC: 3.9 MMOL/L (ref 3.5–5.3)
SODIUM SERPL-SCNC: 132 MMOL/L (ref 136–145)

## 2022-04-20 PROCEDURE — 36415 COLL VENOUS BLD VENIPUNCTURE: CPT

## 2022-04-20 PROCEDURE — 80048 BASIC METABOLIC PNL TOTAL CA: CPT

## 2022-04-21 ENCOUNTER — OFFICE VISIT (OUTPATIENT)
Dept: FAMILY MEDICINE CLINIC | Facility: CLINIC | Age: 67
End: 2022-04-21
Payer: MEDICARE

## 2022-04-21 VITALS
HEIGHT: 69 IN | RESPIRATION RATE: 20 BRPM | OXYGEN SATURATION: 99 % | DIASTOLIC BLOOD PRESSURE: 60 MMHG | WEIGHT: 244 LBS | HEART RATE: 98 BPM | BODY MASS INDEX: 36.14 KG/M2 | TEMPERATURE: 98.1 F | SYSTOLIC BLOOD PRESSURE: 130 MMHG

## 2022-04-21 DIAGNOSIS — I10 PRIMARY HYPERTENSION: Primary | ICD-10-CM

## 2022-04-21 DIAGNOSIS — N13.9 URINARY (TRACT) OBSTRUCTION: ICD-10-CM

## 2022-04-21 DIAGNOSIS — N40.1 ENLARGED PROSTATE WITH URINARY OBSTRUCTION: ICD-10-CM

## 2022-04-21 DIAGNOSIS — Z91.89 FRAMINGHAM CARDIAC RISK >20% IN NEXT 10 YEARS: ICD-10-CM

## 2022-04-21 DIAGNOSIS — Z12.11 COLON CANCER SCREENING: ICD-10-CM

## 2022-04-21 DIAGNOSIS — N18.31 STAGE 3A CHRONIC KIDNEY DISEASE (HCC): ICD-10-CM

## 2022-04-21 DIAGNOSIS — R73.09 ABNORMAL GLUCOSE: ICD-10-CM

## 2022-04-21 DIAGNOSIS — N13.8 ENLARGED PROSTATE WITH URINARY OBSTRUCTION: ICD-10-CM

## 2022-04-21 DIAGNOSIS — E66.9 OBESITY (BMI 30-39.9): ICD-10-CM

## 2022-04-21 PROCEDURE — 99214 OFFICE O/P EST MOD 30 MIN: CPT | Performed by: FAMILY MEDICINE

## 2022-04-21 NOTE — PROGRESS NOTES
Assessment/Plan:    No problem-specific Assessment & Plan notes found for this encounter     htn improved, cont lifestyle efforts and norvasc 10mg/d    DM2 criteria advised, wants 3m to see if he can avoid meds    fram score aware, declines statin    bph with LUTS, chronic nathan, failed last void trial, uro f/u    Creat slightly better, now 1 2, probable CKD     Diagnoses and all orders for this visit:    Primary hypertension    Colon cancer screening  -     Ambulatory referral for colonoscopy; Future    [place] cardiac risk >20% in next 10 years    Abnormal glucose  -     Comprehensive metabolic panel; Future  -     Hemoglobin A1C; Future    Enlarged prostate with urinary obstruction    Urinary (tract) obstruction    Obesity (BMI 30-39  9)    Stage 3a chronic kidney disease ([facility] [address] )        Return in about 3 months (around 7/27/2022) for Recheck  Subjective:      Patient ID: Ruddy Post is a 74 y o  male  Chief Complaint   Patient presents with    Follow-up     on HTN, jlopezcma        HPI  Tolerating norvasc  Some edema in legs  Not dizzy  Failed voiding trial  Labs reviewed    The following portions of the patient's history were reviewed and updated as appropriate: allergies, current medications, past family history, past medical history, past social history, past surgical history and problem list     Review of Systems   Constitutional: Negative for fever  Respiratory: Negative for shortness of breath  Current Outpatient Medications   Medication Sig Dispense Refill    amLODIPine (NORVASC) 10 mg tablet Take 1 tablet (10 mg total) by mouth daily 90 tablet 1    Multiple Vitamin (MULTIVITAMINS PO) Take by mouth Daily      tamsulosin (FLOMAX) 0 4 mg Take 1 capsule (0 4 mg total) by mouth daily with dinner 30 capsule 0     No current facility-administered medications for this visit         Objective:    /60   Pulse 98   Temp 98 1 °F (36 7 °C)   Resp 20   Ht 5' 9" (1 753 m)   Wt 111 kg (244 lb)   SpO2 99%   BMI 36 03 kg/m²        Physical Exam  Vitals and nursing note reviewed  Constitutional:       Appearance: He is well-developed  He is obese  He is not ill-appearing  HENT:      Head: Normocephalic  Eyes:      General: No scleral icterus  Conjunctiva/sclera: Conjunctivae normal    Cardiovascular:      Rate and Rhythm: Normal rate and regular rhythm  Heart sounds: No murmur heard  Pulmonary:      Effort: Pulmonary effort is normal  No respiratory distress  Breath sounds: No rales  Abdominal:      Palpations: Abdomen is soft  Musculoskeletal:         General: No deformity  Cervical back: Neck supple  Right lower leg: Edema present  Left lower leg: Edema present  Skin:     General: Skin is warm and dry  Coloration: Skin is not pale  Neurological:      Mental Status: He is alert  Psychiatric:         Behavior: Behavior normal          Thought Content:  Thought content normal                 Ghazala España,

## 2022-04-29 DIAGNOSIS — N17.9 AKI (ACUTE KIDNEY INJURY) (HCC): ICD-10-CM

## 2022-04-29 DIAGNOSIS — N13.9 URINARY OBSTRUCTION: ICD-10-CM

## 2022-04-29 RX ORDER — TAMSULOSIN HYDROCHLORIDE 0.4 MG/1
0.4 CAPSULE ORAL
Qty: 30 CAPSULE | Refills: 0 | Status: SHIPPED | OUTPATIENT
Start: 2022-04-29 | End: 2022-05-26 | Stop reason: SDUPTHER

## 2022-05-02 ENCOUNTER — RA CDI HCC (OUTPATIENT)
Dept: OTHER | Facility: HOSPITAL | Age: 67
End: 2022-05-02

## 2022-05-02 NOTE — PROGRESS NOTES
[facility] [address]  coding opportunities       Chart reviewed, no opportunity found: CHART REVIEWED, NO OPPORTUNITY FOUND        Patients Insurance     Medicare Insurance: Medicare

## 2022-05-20 ENCOUNTER — PROCEDURE VISIT (OUTPATIENT)
Dept: UROLOGY | Facility: CLINIC | Age: 67
End: 2022-05-20
Payer: MEDICARE

## 2022-05-20 VITALS
BODY MASS INDEX: 34.41 KG/M2 | HEART RATE: 95 BPM | SYSTOLIC BLOOD PRESSURE: 162 MMHG | OXYGEN SATURATION: 99 % | DIASTOLIC BLOOD PRESSURE: 82 MMHG | WEIGHT: 233 LBS

## 2022-05-20 DIAGNOSIS — R33.8 ACUTE RETENTION OF URINE: Primary | ICD-10-CM

## 2022-05-20 PROCEDURE — 51702 INSERT TEMP BLADDER CATH: CPT

## 2022-05-20 NOTE — PROGRESS NOTES
5/20/2022  Marielena Goins is a 69 y o  male  [id number]    Diagnosis:  Chief Complaint     nathan change          Patient presents for routine nathan exchange managed by our office    Plan: Will follow up as scheduled for cysto trus   Patient instructed to call with any questions or concerns in the meantime  No orders of the defined types were placed in this encounter  Vitals:    05/20/22 1255   BP: 162/82   Pulse: 95   SpO2: 99%   Weight: 106 kg (233 lb)           Procedure:    Universal Protocol:  Consent: Verbal consent obtained  Risks and benefits: risks, benefits and alternatives were discussed  Consent given by: patient  Patient understanding: patient states understanding of the procedure being performed  Patient consent: the patient's understanding of the procedure matches consent given  Patient identity confirmed: verbally with patient      Bladder catheterization    Date/Time: 5/20/2022 3:59 PM  Performed by: Lucero Xiong RN  Authorized by: Rashida Kaiser MD     Patient location:  Bedside  Consent:     Consent given by:  Patient  Universal protocol:     Procedure explained and questions answered to patient or proxy's satisfaction: yes      Patient identity confirmed:  Verbally with patient  Pre-procedure details:     Procedure purpose:  Therapeutic  Anesthesia (see MAR for exact dosages): Anesthesia method:  None  Procedure details:     Bladder irrigation: no      Catheter insertion:  Indwelling    Approach: natural orifice      Catheter type:  Coude, latex and Nathan    Catheter size:  16 Fr    Number of attempts:  1    Successful placement: yes      Urine characteristics:  Yellow  Comments:      10 ml removed from old nathan balloon  Nathan removed without issue  New catheter inserted without issue    Attached to leg bag and extra leg bags supplied            Lucero Xiong RN

## 2022-05-26 DIAGNOSIS — N13.9 URINARY OBSTRUCTION: ICD-10-CM

## 2022-05-26 DIAGNOSIS — N17.9 AKI (ACUTE KIDNEY INJURY) (HCC): ICD-10-CM

## 2022-05-26 RX ORDER — TAMSULOSIN HYDROCHLORIDE 0.4 MG/1
0.4 CAPSULE ORAL
Qty: 30 CAPSULE | Refills: 2 | Status: SHIPPED | OUTPATIENT
Start: 2022-05-26 | End: 2022-07-22 | Stop reason: SDUPTHER

## 2022-06-08 ENCOUNTER — TELEPHONE (OUTPATIENT)
Dept: UROLOGY | Facility: CLINIC | Age: 67
End: 2022-06-08

## 2022-06-09 NOTE — TELEPHONE ENCOUNTER
Pt called to return VM and pt is looking to see if he could have the 1:15 appt instead of 9;30 on 7/29/22 due to transportation   Dr Mo Young has that appt on hold for r/s only    Pt call [facility]-[phone] or [phone]

## 2022-07-22 ENCOUNTER — OFFICE VISIT (OUTPATIENT)
Dept: FAMILY MEDICINE CLINIC | Facility: CLINIC | Age: 67
End: 2022-07-22
Payer: MEDICARE

## 2022-07-22 VITALS
HEIGHT: 69 IN | WEIGHT: 230 LBS | OXYGEN SATURATION: 98 % | HEART RATE: 68 BPM | RESPIRATION RATE: 16 BRPM | BODY MASS INDEX: 34.07 KG/M2 | DIASTOLIC BLOOD PRESSURE: 72 MMHG | TEMPERATURE: 97.6 F | SYSTOLIC BLOOD PRESSURE: 156 MMHG

## 2022-07-22 DIAGNOSIS — R73.01 IFG (IMPAIRED FASTING GLUCOSE): ICD-10-CM

## 2022-07-22 DIAGNOSIS — N13.9 URINARY OBSTRUCTION: ICD-10-CM

## 2022-07-22 DIAGNOSIS — R73.09 ABNORMAL GLUCOSE: Primary | ICD-10-CM

## 2022-07-22 DIAGNOSIS — N13.8 ENLARGED PROSTATE WITH URINARY OBSTRUCTION: ICD-10-CM

## 2022-07-22 DIAGNOSIS — N40.1 ENLARGED PROSTATE WITH URINARY OBSTRUCTION: ICD-10-CM

## 2022-07-22 DIAGNOSIS — Z12.11 COLON CANCER SCREENING: ICD-10-CM

## 2022-07-22 DIAGNOSIS — I10 PRIMARY HYPERTENSION: ICD-10-CM

## 2022-07-22 LAB
ALBUMIN SERPL-MCNC: 4.5 G/DL (ref 3.8–4.8)
ALBUMIN/GLOB SERPL: 1.7 {RATIO} (ref 1.2–2.2)
ALP SERPL-CCNC: 85 IU/L (ref 44–121)
ALT SERPL-CCNC: 12 IU/L (ref 0–44)
AST SERPL-CCNC: 14 IU/L (ref 0–40)
BILIRUB SERPL-MCNC: 0.9 MG/DL (ref 0–1.2)
BUN SERPL-MCNC: 22 MG/DL (ref 8–27)
BUN/CREAT SERPL: 19 (ref 10–24)
CALCIUM SERPL-MCNC: 9.9 MG/DL (ref 8.6–10.2)
CHLORIDE SERPL-SCNC: 100 MMOL/L (ref 96–106)
CO2 SERPL-SCNC: 24 MMOL/L (ref 20–29)
CREAT SERPL-MCNC: 1.13 MG/DL (ref 0.76–1.27)
EGFR: 70 ML/MIN/1.73
GLOBULIN SER-MCNC: 2.7 G/DL (ref 1.5–4.5)
GLUCOSE SERPL-MCNC: 132 MG/DL (ref 65–99)
HBA1C MFR BLD: 6.3 % (ref 4.8–5.6)
POTASSIUM SERPL-SCNC: 5.1 MMOL/L (ref 3.5–5.2)
PROT SERPL-MCNC: 7.2 G/DL (ref 6–8.5)
SODIUM SERPL-SCNC: 140 MMOL/L (ref 134–144)

## 2022-07-22 PROCEDURE — 99214 OFFICE O/P EST MOD 30 MIN: CPT | Performed by: FAMILY MEDICINE

## 2022-07-22 RX ORDER — AMLODIPINE BESYLATE 10 MG/1
10 TABLET ORAL DAILY
Qty: 90 TABLET | Refills: 1 | Status: SHIPPED | OUTPATIENT
Start: 2022-07-22 | End: 2022-10-21 | Stop reason: SDUPTHER

## 2022-07-22 RX ORDER — TAMSULOSIN HYDROCHLORIDE 0.4 MG/1
0.4 CAPSULE ORAL
Qty: 90 CAPSULE | Refills: 1 | Status: SHIPPED | OUTPATIENT
Start: 2022-07-22 | End: 2022-10-21 | Stop reason: SDUPTHER

## 2022-07-22 NOTE — PROGRESS NOTES
Assessment/Plan:    No problem-specific Assessment & Plan notes found for this encounter     htn not at goal, he plans to work on it harder, continue norvasc 10mg    bph with obstruction, urology f/u    Continue flomax, continuation per urology pending procedure    Diabetes range glucose but a1c <7 for now, monitor for indication to start medication, diet advised     Diagnoses and all orders for this visit:    Abnormal glucose    Colon cancer screening  -     Ambulatory referral for colonoscopy; Future    Enlarged prostate with urinary obstruction    BMI 33 0-33 9,adult    Primary hypertension  -     amLODIPine (NORVASC) 10 mg tablet; Take 1 tablet (10 mg total) by mouth daily    Urinary obstruction  -     tamsulosin (FLOMAX) 0 4 mg; Take 1 capsule (0 4 mg total) by mouth daily with dinner    IFG (impaired fasting glucose)  -     Hemoglobin A1C; Future  -     Comprehensive metabolic panel; Future              Return in about 6 months (around 1/22/2023) for Recheck  Subjective:      Patient ID: Cielo Brandon is a 69 y o  male  Chief Complaint   Patient presents with    Follow-up     Labs mz cma       HPI  Planning urologic f/u which was delayed  Chronic nathan  No blood in urine  No fever    Taking flomax  No side effects    Taking norvasc  10mg/d  Is losing wt  Cutting calories, wife manages it  Some exercise and stationary bike    Labs reviewed  fbs over 125 recurrently but a1c 6 3  Creatinine 1 13    The following portions of the patient's history were reviewed and updated as appropriate: allergies, current medications, past family history, past medical history, past social history, past surgical history and problem list     Review of Systems   Constitutional: Negative for chills and fever  Gastrointestinal: Negative for abdominal pain           Current Outpatient Medications   Medication Sig Dispense Refill    amLODIPine (NORVASC) 10 mg tablet Take 1 tablet (10 mg total) by mouth daily 90 tablet 1    Multiple Vitamin (MULTIVITAMINS PO) Take by mouth Daily      tamsulosin (FLOMAX) 0 4 mg Take 1 capsule (0 4 mg total) by mouth daily with dinner 90 capsule 1     No current facility-administered medications for this visit  Objective:    /72   Pulse 68   Temp 97 6 °F (36 4 °C)   Resp 16   Ht 5' 9" (1 753 m)   Wt 104 kg (230 lb)   SpO2 98%   BMI 33 97 kg/m²        Physical Exam  Vitals and nursing note reviewed  Constitutional:       Appearance: He is well-developed  He is obese  He is not ill-appearing  HENT:      Head: Normocephalic  Right Ear: Tympanic membrane normal       Left Ear: Tympanic membrane normal    Eyes:      General: No scleral icterus  Conjunctiva/sclera: Conjunctivae normal    Cardiovascular:      Rate and Rhythm: Normal rate and regular rhythm  Heart sounds: No murmur heard  Pulmonary:      Effort: Pulmonary effort is normal  No respiratory distress  Breath sounds: No wheezing  Abdominal:      General: There is no distension  Palpations: Abdomen is soft  Tenderness: There is no abdominal tenderness  There is no right CVA tenderness or left CVA tenderness  Musculoskeletal:         General: No deformity  Cervical back: Neck supple  Right lower leg: No edema  Left lower leg: No edema  Skin:     General: Skin is warm and dry  Coloration: Skin is not pale  Neurological:      Mental Status: He is alert  Gait: Gait normal    Psychiatric:         Mood and Affect: Mood normal          Behavior: Behavior normal          Thought Content:  Thought content normal                 Vernadine Leys, DO

## 2022-07-28 PROBLEM — Z71.2 ENCOUNTER TO DISCUSS TEST RESULTS: Status: ACTIVE | Noted: 2022-07-28

## 2022-07-28 NOTE — PROGRESS NOTES
Problem List Items Addressed This Visit        Genitourinary    Hydroureteronephrosis    Relevant Orders    Case request operating room: TRANSURETHRAL RESECTION OF PROSTATE (TURP) (Completed)    Urinary (tract) obstruction    Enlarged prostate with urinary obstruction - Primary    Relevant Medications    finasteride (PROSCAR) 5 mg tablet    Other Relevant Orders    Case request operating room: TRANSURETHRAL RESECTION OF PROSTATE (TURP) (Completed)    Stage 3a chronic kidney disease ([facility] [address] )       Other    Prostate cancer screening    Elevated serum creatinine    Encounter to discuss test results            Discussion:    Niles Contreras and his son and I had a productive consultation today  He initially presented with bilateral hydroureteronephrosis and urinary tract obstruction with a baseline of chronic kidney disease causing worsened acute kidney injury  He has been wearing a Reyes catheter with a failed trial of void since that time  With regard to prostate cancer screening his PSA is well within the normal range  He does meet the indication for surgery regarding renal failure due to bladder outlet obstruction  Cystoscopy today shows trilobar hypertrophy with high-grade obstruction the bladder outlet, no malignant findings, any 45 7 for gram gland regarding the portion of the prostate below the bladder, the median lobe itself is approximately 20 grams as well  I counseled him on the initiation of a 5 alpha reductase inhibitor in terms of maximal medical therapy while awaiting surgery  We talked about the pre, blas, postoperative care for transurethral resection of prostate  He does understand the potential for changes in sexual and ejaculatory function after this procedure    He does understand the need for overnight admission for continuous bladder irrigation and the potential complications of infection, bleeding, pain, damage to surrounding structures, need for additional procedures, risk of failure of the procedure, risk of anesthesia, risk of positioning complications including neurapraxia, chronic pain, and paralysis as well as risk of rhabdomyolysis and compartment syndrome  Risk of deep venous thrombosis and venous thromboembolism as well as pneumonia and other potential unpredictable complications were also discussed with the patient  He does provide informed consent to proceed  He will return for a trial of void in 2 weeks as he wishes to be catheter free while awaiting surgery if possible  Assessment and plan:       Please see problem oriented charting for the assessment plan of today's urological complaints      Joyce Smith MD      Chief Complaint     Chief Complaint   Patient presents with    Cystoscopy     TRUS         History of Present Illness     Kenny Baeza is a 69 y o  man with bladder outlet obstruction and bilateral hydronephrosis due to this  He has been living with a catheter (catheter dependent urinary retention) for some months now  Wishes to be catheter free  Extensive discussion as above regarding outlet surgery  I recommend a TURP  The following portions of the patient's history were reviewed and updated as appropriate: allergies, current medications, past family history, past medical history, past social history, past surgical history and problem list     Detailed Urologic History     - please refer to HPI    Review of Systems     Review of Systems   Constitutional: Negative  HENT: Negative  Eyes: Negative  Respiratory: Negative  Cardiovascular: Positive for leg swelling (Longstanding and associated with varicose veins)  Denies chest pain or shortness of breath, able to walk a number of blocks and climb stairs without stopping per report   Gastrointestinal: Negative  Endocrine: Negative  Genitourinary: Positive for difficulty urinating  Musculoskeletal: Negative  Skin: Negative  Allergic/Immunologic: Negative  Neurological: Negative  Hematological: Negative  Psychiatric/Behavioral: Negative  Allergies     No Known Allergies    Physical Exam     Physical Exam  Vitals reviewed  Constitutional:       General: He is not in acute distress  Appearance: Normal appearance  He is not ill-appearing, toxic-appearing or diaphoretic  HENT:      Head: Normocephalic and atraumatic  Eyes:      General: No scleral icterus  Right eye: No discharge  Left eye: No discharge  Cardiovascular:      Pulses: Normal pulses  Pulmonary:      Effort: Pulmonary effort is normal    Abdominal:      General: There is distension (Due to abdominal obesity)  Genitourinary:     Comments: Uncircumcised, foreskin reduced over the head of the penis at the end of today's cystoscopy  Musculoskeletal:      Right lower leg: Edema present  Left lower leg: Edema present  Comments: Bilateral varicose veins present   Skin:     General: Skin is warm  Neurological:      General: No focal deficit present  Mental Status: He is alert and oriented to person, place, and time  Psychiatric:         Mood and Affect: Mood normal          Behavior: Behavior normal          Thought Content:  Thought content normal          Judgment: Judgment normal              Vital Signs  Vitals:    07/29/22 1315   BP: 166/84   BP Location: Left arm   Patient Position: Sitting   Cuff Size: Large   Pulse: 104   Resp: 16   SpO2: 98%   Weight: 104 kg (228 lb 3 2 oz)   Height: 5' 9" (1 753 m)         Current Medications       Current Outpatient Medications:     amLODIPine (NORVASC) 10 mg tablet, Take 1 tablet (10 mg total) by mouth daily, Disp: 90 tablet, Rfl: 1    finasteride (PROSCAR) 5 mg tablet, Take 1 tablet (5 mg total) by mouth daily, Disp: 90 tablet, Rfl: 3    Multiple Vitamin (MULTIVITAMINS PO), Take by mouth Daily, Disp: , Rfl:     tamsulosin (FLOMAX) 0 4 mg, Take 1 capsule (0 4 mg total) by mouth daily with dinner, Disp: 90 capsule, Rfl: 1      Active Problems     Patient Active Problem List   Diagnosis    Allergic rhinitis    BMI 33 0-33 9,adult    Obesity (BMI 30-39  9)    Immunization due    Colon cancer screening    Prostate cancer screening    Screening for diabetes mellitus (DM)    Screening for cardiovascular, respiratory, and genitourinary diseases    Need for hepatitis C screening test    Hydroureteronephrosis    Urinary (tract) obstruction    Elevated serum creatinine    Enlarged prostate with urinary obstruction    Medicare annual wellness visit, subsequent    Varicose veins of both lower extremities    Constipation    Primary hypertension    Obesity, morbid ([facility] )    [place] cardiac risk >20% in next 10 years    Abnormal glucose    Stage 3a chronic kidney disease ([facility] )    Encounter to discuss test results         Past Medical History     Past Medical History:   Diagnosis Date    Abnormal weight gain 06/12/2008    LAST ASSESSED: 6/12/08    Anal fissure 03/05/2010    LAST ASSESSED: 3/5/10         Surgical History     History reviewed  No pertinent surgical history  Family History     Family History   Problem Relation Age of Onset    Diabetes Mother         MELLITUS    Hypertension Mother          Social History     Social History     Social History     Tobacco Use   Smoking Status Former Smoker   Smokeless Tobacco Never Used         Pertinent Lab Values     Lab Results   Component Value Date    CREATININE 1 13 07/21/2022       Lab Results   Component Value Date    PSA 2 7 04/08/2022             Pertinent Imaging       The patient's images were reviewed by me personally and also in real time with them in the examination room using our PACS imaging system  The imaging findings are significant for bilateral hydronephrosis and prostatomegaly

## 2022-07-29 ENCOUNTER — PROCEDURE VISIT (OUTPATIENT)
Dept: UROLOGY | Facility: CLINIC | Age: 67
End: 2022-07-29
Payer: MEDICARE

## 2022-07-29 VITALS
RESPIRATION RATE: 16 BRPM | HEART RATE: 104 BPM | DIASTOLIC BLOOD PRESSURE: 84 MMHG | HEIGHT: 69 IN | SYSTOLIC BLOOD PRESSURE: 166 MMHG | WEIGHT: 228.2 LBS | BODY MASS INDEX: 33.8 KG/M2 | OXYGEN SATURATION: 98 %

## 2022-07-29 DIAGNOSIS — N13.30 HYDROURETERONEPHROSIS: ICD-10-CM

## 2022-07-29 DIAGNOSIS — Z12.5 PROSTATE CANCER SCREENING: ICD-10-CM

## 2022-07-29 DIAGNOSIS — Z71.2 ENCOUNTER TO DISCUSS TEST RESULTS: ICD-10-CM

## 2022-07-29 DIAGNOSIS — N40.1 ENLARGED PROSTATE WITH URINARY OBSTRUCTION: Primary | ICD-10-CM

## 2022-07-29 DIAGNOSIS — N13.9 URINARY (TRACT) OBSTRUCTION: ICD-10-CM

## 2022-07-29 DIAGNOSIS — N18.31 STAGE 3A CHRONIC KIDNEY DISEASE (HCC): ICD-10-CM

## 2022-07-29 DIAGNOSIS — N13.8 ENLARGED PROSTATE WITH URINARY OBSTRUCTION: Primary | ICD-10-CM

## 2022-07-29 DIAGNOSIS — R79.89 ELEVATED SERUM CREATININE: ICD-10-CM

## 2022-07-29 PROCEDURE — 99214 OFFICE O/P EST MOD 30 MIN: CPT | Performed by: UROLOGY

## 2022-07-29 PROCEDURE — 52000 CYSTOURETHROSCOPY: CPT | Performed by: UROLOGY

## 2022-07-29 PROCEDURE — 76872 US TRANSRECTAL: CPT | Performed by: UROLOGY

## 2022-07-29 RX ORDER — FINASTERIDE 5 MG/1
5 TABLET, FILM COATED ORAL DAILY
Qty: 90 TABLET | Refills: 3 | Status: SHIPPED | OUTPATIENT
Start: 2022-07-29 | End: 2022-10-27

## 2022-07-29 RX ORDER — ACETAMINOPHEN 325 MG/1
975 TABLET ORAL ONCE
Status: CANCELLED | OUTPATIENT
Start: 2022-07-29 | End: 2022-07-29

## 2022-07-29 NOTE — PROGRESS NOTES
Office Cystoscopy Procedure Note    Indication:     urinary retention     Informed consent   The risks, benefits, complications, treatment options, and expected outcomes were discussed with the patient  The patient concurred with the proposed plan and provided informed consent  Anesthesia  Lidocaine jelly 2%    Antibiotic prophylaxis   None    Procedure  The patient was placed in the supineposition, was prepped and draped in the usual manner using sterile technique, and 2% lidocaine jelly instilled into the urethra  A 17 F flexible cystoscope was then inserted into the urethra and the urethra and bladder carefully examined  The following findings were noted:    Findings:  Urethra:  Normal  Prostate:  Trilobar hypertrophy, high-grade obstruction  Bladder:  No lesions tumors defects or stones  Ureteral orifices:  Orthotopic  Other findings:  None, retroflexed view confirms    Specimens: None                 Complications:    None; patient tolerated the procedure well           Disposition: To home            Condition: Stable    Plan: Trilobar hypertrophy with high-grade obstruction the bladder outlet, patient is a candidate for TURP       Cystoscopy     Date/Time 7/29/2022 1:54 PM     Performed by  Kenzie Pagan MD     Authorized by Kenzie Pagan MD      Universal Protocol:  Consent: Verbal consent obtained  Written consent obtained  Risks and benefits: risks, benefits and alternatives were discussed  Consent given by: patient  Patient understanding: patient states understanding of the procedure being performed  Patient consent: the patient's understanding of the procedure matches consent given  Procedure consent: procedure consent matches procedure scheduled  Relevant documents: relevant documents present and verified  Test results: test results available and properly labeled  Site marked: the operative site was not marked  Radiology Images displayed and confirmed   If images not available, report reviewed: imaging studies available  Required items: required blood products, implants, devices, and special equipment available  Patient identity confirmed: provided demographic data        Procedure Details:  Procedure type: cystoscopy    Patient tolerance: Patient tolerated the procedure well with no immediate complications    Additional Procedure Details: Office Cystoscopy Procedure Note    Indication:     urinary retention     Informed consent   The risks, benefits, complications, treatment options, and expected outcomes were discussed with the patient  The patient concurred with the proposed plan and provided informed consent  Anesthesia  Lidocaine jelly 2%    Antibiotic prophylaxis   None    Procedure  The patient was placed in the supineposition, was prepped and draped in the usual manner using sterile technique, and 2% lidocaine jelly instilled into the urethra  A 17 F flexible cystoscope was then inserted into the urethra and the urethra and bladder carefully examined    The following findings were noted:    Findings:  Urethra:  Normal  Prostate:  Trilobar hypertrophy, high-grade obstruction  Bladder:  No lesions tumors defects or stones  Ureteral orifices:  Orthotopic  Other findings:  None, retroflexed view confirms    Specimens: None                 Complications:    None; patient tolerated the procedure well           Disposition: To home            Condition: Stable    Plan: Trilobar hypertrophy with high-grade obstruction the bladder outlet, patient is a candidate for TURP

## 2022-07-29 NOTE — PROGRESS NOTES
Office TRUS no biopsy    Indication    Urinary retention    Informed consent   The risks, benefits and alternatives to TRUS discussed with patient, informed consent obtained      Transrectal ultrasonography  The patient was placed in the left lateral decubitus position  After an attentive digital rectal examination, a 7 5 mHz sidefire ultrasound probe was gently inserted into the rectum and biplanar imaging of the prostate was done with the findings noted below  Images were taken of any abnormal findings and also to document prostate size  Bladder  The bladder base appeared normal     Prostate      Ultrasound size measurements:  -Volume:  45 7 cm3    Ultrasound findings:  -Cysts: None  -Masses: None  -Median lobe: present                  Complications  There were no procedural complications  Disposition  The patient was dismissed to home     Post-procedure instructions: Today he underwent an uncomplicated transrectal ultrasound showing a 45 7 gram gland with trilobar hypertrophy and high-grade obstruction  Biopsy prostate     Date/Time 7/29/2022 1:56 PM     Performed by  Marko Flanagan MD     Authorized by Marko Flanagan MD      Universal Protocol   Consent: Verbal consent obtained  Written consent obtained  Risks and benefits: risks, benefits and alternatives were discussed  Consent given by: patient  Patient understanding: patient states understanding of the procedure being performed  Patient consent: the patient's understanding of the procedure matches consent given  Procedure consent: procedure consent matches procedure scheduled  Relevant documents: relevant documents present and verified  Test results: test results available and properly labeled  Site marked: the operative site was not marked  Radiology Images displayed and confirmed   If images not available, report reviewed: imaging studies available  Required items: required blood products, implants, devices, and special equipment available  Patient identity confirmed: verbally with patient and provided demographic data        Local anesthesia used: no     Anesthesia   Local anesthesia used: no     Sedation   Patient sedated: no        Specimen: no    Culture: no   Procedure Details   Procedure Notes: Office TRUS no biopsy    Indication    Urinary retention    Informed consent   The risks, benefits and alternatives to TRUS discussed with patient, informed consent obtained      Transrectal ultrasonography  The patient was placed in the left lateral decubitus position  After an attentive digital rectal examination, a 7 5 mHz sidefire ultrasound probe was gently inserted into the rectum and biplanar imaging of the prostate was done with the findings noted below  Images were taken of any abnormal findings and also to document prostate size  Bladder  The bladder base appeared normal     Prostate      Ultrasound size measurements:  -Volume:  45 7 cm3    Ultrasound findings:  -Cysts: None  -Masses: None  -Median lobe: present                  Complications  There were no procedural complications  Disposition  The patient was dismissed to home     Post-procedure instructions:      Today he underwent an uncomplicated transrectal ultrasound showing a 45 7 gram gland with trilobar hypertrophy and high-grade obstruction  Patient Transportation: confirmed  Patient tolerance: patient tolerated the procedure well with no immediate complications

## 2022-08-01 ENCOUNTER — PREP FOR PROCEDURE (OUTPATIENT)
Dept: UROLOGY | Facility: CLINIC | Age: 67
End: 2022-08-01

## 2022-08-01 ENCOUNTER — TELEPHONE (OUTPATIENT)
Dept: UROLOGY | Facility: CLINIC | Age: 67
End: 2022-08-01

## 2022-08-01 DIAGNOSIS — N13.8 ENLARGED PROSTATE WITH URINARY OBSTRUCTION: Primary | ICD-10-CM

## 2022-08-01 DIAGNOSIS — Z01.812 PRE-OPERATIVE LABORATORY EXAMINATION: ICD-10-CM

## 2022-08-01 DIAGNOSIS — N40.1 ENLARGED PROSTATE WITH URINARY OBSTRUCTION: Primary | ICD-10-CM

## 2022-08-01 DIAGNOSIS — N13.30 HYDROURETERONEPHROSIS: ICD-10-CM

## 2022-08-01 DIAGNOSIS — R39.89 SUSPECTED UTI: ICD-10-CM

## 2022-08-01 DIAGNOSIS — Z79.01 LONG TERM (CURRENT) USE OF ANTICOAGULANTS: ICD-10-CM

## 2022-08-01 NOTE — TELEPHONE ENCOUNTER
----- Message from Marcy Lozano MD sent at 8/1/2022 11:09 AM EDT -----  10/14 week works  ----- Message -----  From: Grace Martin  Sent: 8/1/2022  11:07 AM EDT  To: Marcy Lozano MD    Hi Dr Tiarra Chapin,    I was just going through your case depot and see an order for this patient  Just wanted let you know your next available AN Main date is on 11/07  I am not sure if this case can wait until then or if you would be okay with me adding him on while you are on BA Call the week of 10/14-10/20? If you need him seen sooner and it would be okay with you I can add him as a second case on 8/25 while you are on BA Call   Please let me know what would work best  Thank you

## 2022-08-01 NOTE — TELEPHONE ENCOUNTER
Called and spoke with patient to schedule sx  Patient informed of all PAT's needed prior to sx and advised to stop any anticoagulant medication including Multi-vitamins, Fish oil, Krill oil and NSAID 7days prior  Informed patient nothing to eat or drink after midnight the night before  Patient has also been inform that the hospital will call the day before sx with arrival time and procedure time  Patient also informed to have a  bring them to and from hospital  Bowel Prep and Showering Instruction has been told to the patient and will also be included in Surgical Packet to be mailed      Sx Date: 10/14  Location: [facility]  Physician: Harriet SARABIA Date:  On admit  Clearance Date: medical 10/07  Consent: on admit  Pre-cert Added to  list on : none mc/ satish    PAT's Ordered to be done on:  9/23 cbc/ bmp/ ptt/ inr/t&s/ ucx    Post Op: 11/04    Surgical Packet to be: mail

## 2022-08-12 ENCOUNTER — PROCEDURE VISIT (OUTPATIENT)
Dept: UROLOGY | Facility: CLINIC | Age: 67
End: 2022-08-12
Payer: MEDICARE

## 2022-08-12 VITALS
WEIGHT: 228 LBS | BODY MASS INDEX: 33.77 KG/M2 | HEIGHT: 69 IN | SYSTOLIC BLOOD PRESSURE: 140 MMHG | DIASTOLIC BLOOD PRESSURE: 90 MMHG

## 2022-08-12 DIAGNOSIS — N40.1 ENLARGED PROSTATE WITH URINARY OBSTRUCTION: Primary | ICD-10-CM

## 2022-08-12 DIAGNOSIS — N13.8 ENLARGED PROSTATE WITH URINARY OBSTRUCTION: Primary | ICD-10-CM

## 2022-08-12 LAB — POST-VOID RESIDUAL VOLUME, ML POC: 311 ML

## 2022-08-12 PROCEDURE — 51798 US URINE CAPACITY MEASURE: CPT

## 2022-08-12 PROCEDURE — 51702 INSERT TEMP BLADDER CATH: CPT

## 2022-08-12 NOTE — PROGRESS NOTES
8/12/2022    Lalo Hamilton  1955  [id number]    Diagnosis  Chief Complaint     Reyes removal / void trial          Patient presents for Reyes removal / void trial  managed by Dr Too Kim  Return for next catheter change    Procedure Reyes removal/voiding trial    Reyes catheter removed after deflation of an intact balloon  Patient tolerated well  Encouraged patient to hydrate well and return this afternoon for post void residual   he knows he may return early if uncomfortable and unable to urinate  Patient agrees to this plan  Patient returned this afternoon earlier than scheduled due to feeling uncomfortable  Patient states unable to void  Patient voided droplets while in office  Bladder ultrasound performed and PVR measured 311ml  Spoke with KATIE Weller who advised to place Reyes catheter and maintain until surgery  Recent Results (from the past 4 hour(s))   POCT Measure PVR    Collection Time: 08/12/22  2:16 PM   Result Value Ref Range    POST-VOID RESIDUAL VOLUME, ML  mL       Universal Protocol:  Consent: Verbal consent obtained  Risks and benefits: risks, benefits and alternatives were discussed  Consent given by: patient  Patient understanding: patient states understanding of the procedure being performed  Patient identity confirmed: verbally with patient      Bladder catheterization    Date/Time: 8/12/2022 2:20 PM  Performed by: Mi Gonzales RN  Authorized by: Justin Stevenson PA-C     Patient location:  Bedside  Consent:     Consent given by:  Patient  Universal protocol:     Procedure explained and questions answered to patient or proxy's satisfaction: yes      Patient identity confirmed:  Verbally with patient  Pre-procedure details:     Procedure purpose:  Therapeutic    Preparation: Patient was prepped and draped in usual sterile fashion    Anesthesia (see MAR for exact dosages):      Anesthesia method:  None  Procedure details:     Bladder irrigation: no      Catheter insertion: Indwelling    Approach: natural orifice      Catheter type:  Coude, Reyes and latex    Catheter size:  16 Fr    Number of attempts:  1    Successful placement: yes      Urine characteristics:  Clear and yellow  Post-procedure details:     Patient tolerance of procedure: Tolerated well, no immediate complications  Comments:      After placement of Reyes catheter, bladder was drained of 650 mL clear yellow urine  Patient stated he felt relief once bladder was drained  Attached to leg bag and provided patient with 2 extra leg bags  He was scheduled for another Reyes catheter exchange 9/16/22          Vitals:    08/12/22 0818   BP: 140/90   BP Location: Left arm   Weight: 103 kg (228 lb)   Height: 5' 9" (1 753 m)           Bre Bangura, RN,BSN

## 2022-08-12 NOTE — PROGRESS NOTES
I supervised the Advanced Practitioner  I reviewed the Advanced Practitioner note and agree      Salome Hull MD 08/12/22

## 2022-08-13 ENCOUNTER — HOSPITAL ENCOUNTER (EMERGENCY)
Facility: HOSPITAL | Age: 67
Discharge: HOME | End: 2022-08-13
Attending: EMERGENCY MEDICINE
Payer: MEDICARE

## 2022-08-13 VITALS
RESPIRATION RATE: 18 BRPM | SYSTOLIC BLOOD PRESSURE: 127 MMHG | OXYGEN SATURATION: 97 % | HEART RATE: 90 BPM | DIASTOLIC BLOOD PRESSURE: 71 MMHG | TEMPERATURE: 97.8 F

## 2022-08-13 DIAGNOSIS — T83.091A: ICD-10-CM

## 2022-08-13 DIAGNOSIS — R33.9 URINARY RETENTION: Primary | ICD-10-CM

## 2022-08-13 PROCEDURE — 99284 EMERGENCY DEPT VISIT MOD MDM: CPT | Performed by: EMERGENCY MEDICINE

## 2022-08-13 PROCEDURE — 99283 EMERGENCY DEPT VISIT LOW MDM: CPT

## 2022-08-13 NOTE — ED NOTES
Traditional drainage back switched to leg bag for pt prior to d/c  Reyes patent and draining cloudy yellow urine at time of d/c  Pt reporting abd pain has resolved       Stephan Coles RN  08/13/22 1007

## 2022-08-13 NOTE — ED PROVIDER NOTES
History  Chief Complaint   Patient presents with    Urinary Retention     Pt arrives c/o urinary retention following nathan catheter reentry  (Had one removed yesterday and then replaced a few hours later)  Pt denies clots or blood in bag  States he has not had urine outpt since approx 0600 this morning  Pt c/o severe pain in lower abd and need to void  64-year-old male presents the emergency department for evaluation of urinary retention  Patient states that he had an indwelling Nathan catheter for the past 2 to 3 months  Yesterday he went to the urology office in the morning and had the catheter removed  He was not able to void effectively and the catheter was reinserted yesterday afternoon in the urologist office  Patient states that he last noticed output through the catheter around 630 this morning  Patient states that his bladder feels full and distended  Patient denies fevers or chills  No nausea vomiting  He has been having normal bowel movements  He has a known history of prostatomegaly and He is scheduled to have TURP in October  Initial attempt to flush the catheter was not successful  The 12 [place] Nathan catheter was removed a large clot was noted to be occluding the catheter  History provided by:  Patient, medical records and relative   used: No    Medical Problem  Associated symptoms: abdominal pain    Associated symptoms: no fever        Prior to Admission Medications   Prescriptions Last Dose Informant Patient Reported? Taking?    Multiple Vitamin (MULTIVITAMINS PO)  Self Yes No   Sig: Take by mouth Daily   amLODIPine (NORVASC) 10 mg tablet  Self No No   Sig: Take 1 tablet (10 mg total) by mouth daily   finasteride (PROSCAR) 5 mg tablet  Self No No   Sig: Take 1 tablet (5 mg total) by mouth daily   tamsulosin (FLOMAX) 0 4 mg  Self No No   Sig: Take 1 capsule (0 4 mg total) by mouth daily with dinner      Facility-Administered Medications: None       Past Medical History:   Diagnosis Date    Abnormal weight gain 06/12/2008    LAST ASSESSED: 6/12/08    Anal fissure 03/05/2010    LAST ASSESSED: 3/5/10    Diabetes mellitus ([facility] [address] )     Enlarged prostate     Hypertension        Past Surgical History:   Procedure Laterality Date    TONSILLECTOMY         Family History   Problem Relation Age of Onset    Diabetes Mother         MELLITUS    Hypertension Mother      I have reviewed and agree with the history as documented  E-Cigarette/Vaping    E-Cigarette Use Never User      E-Cigarette/Vaping Substances    Nicotine No     THC No     CBD No     Flavoring No     Other No     Unknown No      Social History     Tobacco Use    Smoking status: Former Smoker    Smokeless tobacco: Never Used   Vaping Use    Vaping Use: Never used   Substance Use Topics    Alcohol use: Never    Drug use: Never       Review of Systems   Constitutional: Negative for chills and fever  Gastrointestinal: Positive for abdominal pain  Genitourinary: Positive for decreased urine volume and difficulty urinating  Negative for flank pain, penile discharge, penile pain, penile swelling and scrotal swelling  Musculoskeletal: Negative for back pain  All other systems reviewed and are negative  Physical Exam  Physical Exam  Vitals reviewed  Exam conducted with a chaperone present  Constitutional:       General: He is in acute distress  Appearance: Normal appearance  He is well-developed  HENT:      Head: Normocephalic and atraumatic  Eyes:      Conjunctiva/sclera: Conjunctivae normal       Pupils: Pupils are equal, round, and reactive to light  Cardiovascular:      Rate and Rhythm: Regular rhythm  Tachycardia present  Heart sounds: Normal heart sounds  Pulmonary:      Effort: Pulmonary effort is normal  No accessory muscle usage or respiratory distress  Breath sounds: Normal breath sounds  Chest:      Chest wall: No tenderness     Abdominal:      General: Abdomen is protuberant  Bowel sounds are normal  There is no distension  Palpations: Abdomen is soft  Tenderness: There is abdominal tenderness in the suprapubic area  There is no right CVA tenderness, left CVA tenderness, guarding or rebound  Hernia: No hernia is present  Genitourinary:     Penis: Normal and uncircumcised  Musculoskeletal:         General: No tenderness or deformity  Normal range of motion  Cervical back: Normal range of motion and neck supple  Lymphadenopathy:      Cervical: No cervical adenopathy  Skin:     General: Skin is warm and dry  Findings: No rash  Neurological:      Mental Status: He is alert and oriented to person, place, and time  Coordination: Coordination normal       Deep Tendon Reflexes: Reflexes are normal and symmetric  Psychiatric:         Behavior: Behavior normal          Thought Content: Thought content normal          Judgment: Judgment normal          Vital Signs  ED Triage Vitals [08/13/22 1523]   Temperature Pulse Respirations Blood Pressure SpO2   97 8 °F (36 6 °C) (!) 114 18 (!) 184/86 96 %      Temp Source Heart Rate Source Patient Position - Orthostatic VS BP Location FiO2 (%)   Oral Monitor Sitting Left arm --      Pain Score       --           Vitals:    08/13/22 1523 08/13/22 1558   BP: (!) 184/86 127/71   Pulse: (!) 114 90   Patient Position - Orthostatic VS: Sitting Sitting         Visual Acuity      ED Medications  Medications - No data to display    Diagnostic Studies  Results Reviewed     None                 No orders to display              Procedures  Procedures         ED Course        Patient had 16 [place] Reyes catheter exchange and approximately 1 L of urine output immediately  Patient  had significant improvement in discomfort  No clots noted in the collection bag  Patient tolerated the procedure well  He will follow up with Urology as scheduled                                    MDM  Number of Diagnoses or Management Options  Obstructed Reyes catheter [facility]): new and requires workup  Urinary retention: new and requires workup     Amount and/or Complexity of Data Reviewed  Decide to obtain previous medical records or to obtain history from someone other than the patient: yes  Obtain history from someone other than the patient: yes  Independent visualization of images, tracings, or specimens: yes    Risk of Complications, Morbidity, and/or Mortality  General comments: 22-year-old male presents with obstructed Reyes catheter  The Reyes was removed a clot was noted to be obstructing the catheter output  A new Reyes catheter was placed and patient had immediate improvement in symptoms and drain approximately 1 L of clear yellow urine  Patient will continue to have indwelling Reyes catheter until re-evaluated by his urologist     Patient Progress  Patient progress: improved      Disposition  Final diagnoses:   Urinary retention   Obstructed Reyes catheter [facility])     Time reflects when diagnosis was documented in both MDM as applicable and the Disposition within this note     Time User Action Codes Description Comment    8/13/2022  4:12 PM Farideh Coffman Add [R33 9] Urinary retention     8/13/2022  4:13 PM Farideh Coffman Add [T83 091A] Obstructed Reyes catheter [facility])       ED Disposition     ED Disposition   Discharge    Condition   Stable    Date/Time   Sat Aug 13, 2022  4:12 PM    Comment   Aleah Hamilton discharge to home/self care                 Follow-up Information     Follow up With Specialties Details Why Contact Info    Kenzie Pagan MD Urology Schedule an appointment as soon as possible for a visit  For recheck of current symptoms [address] [place] [address]  [phone]            Discharge Medication List as of 8/13/2022  4:21 PM      CONTINUE these medications which have NOT CHANGED    Details   amLODIPine (NORVASC) 10 mg tablet Take 1 tablet (10 mg total) by mouth daily, Starting Fri 7/22/2022, Until Sun 8/21/2022, Normal      finasteride (PROSCAR) 5 mg tablet Take 1 tablet (5 mg total) by mouth daily, Starting Fri 7/29/2022, Until Thu 10/27/2022, Normal      Multiple Vitamin (MULTIVITAMINS PO) Take by mouth Daily, Historical Med      tamsulosin (FLOMAX) 0 4 mg Take 1 capsule (0 4 mg total) by mouth daily with dinner, Starting Fri 7/22/2022, Until Sun 8/21/2022, Normal             No discharge procedures on file      PDMP Review     None          ED Provider  Electronically Signed by           Martha Hayes, DO  08/13/22 2606 [facility], DO  08/13/22 6080

## 2022-08-19 ENCOUNTER — NURSE TRIAGE (OUTPATIENT)
Dept: OTHER | Facility: OTHER | Age: 67
End: 2022-08-19

## 2022-08-19 DIAGNOSIS — N32.89 BLADDER SPASM: Primary | ICD-10-CM

## 2022-08-19 RX ORDER — OXYBUTYNIN CHLORIDE 5 MG/1
5 TABLET ORAL 3 TIMES DAILY PRN
Qty: 30 TABLET | Refills: 3 | Status: SHIPPED | OUTPATIENT
Start: 2022-08-19

## 2022-08-19 NOTE — TELEPHONE ENCOUNTER
Patient is calling with concerns about his catheter leaking  He states that it was changed last Saturday in the ED and a blood clot was found on the tip of the cathter at that time  He is still passing urine through cathter now but he is having leakage of urine at the insertion site  Denies fever or abdominal pain or any blood in urine

## 2022-08-19 NOTE — TELEPHONE ENCOUNTER
Returned call to patient   Last office visit was for nathan voiding trial/ pvr nathan was placed  States that he is having some leakage when nathan is draining into bag  Not occurring every time  Urine is reported as yellow in color  No pain, fevers or chills  Patient is currently taking Finasteride 5mg and Tamsulosin 0 4mg   Patient is currently using the leg bag  Advised patient to try switching over to the over night  Check all connects to check for kinks in tubing  No kinks in tubing reported by patient at this time  Advised patient to increase hydration to water , avoid bladder irritants and monitor  Report any changes or worsening of symptoms to our practice  Pharmacy confirmed as   [facility] [place], [place]     Patient verbalized understand and agreement

## 2022-08-19 NOTE — TELEPHONE ENCOUNTER
Patient likely experiencing bladder spasms  Prescription for oxybutynin was electronically sent to his pharmacy and he may take as needed

## 2022-08-19 NOTE — TELEPHONE ENCOUNTER
Relayed to patient he is likely having bladder spasms as long as it still drains in the bag  Explained we sent abx to his pharmacy  Explained potential side effects  Advised to call office with any further issues   Patient verbalized understanding

## 2022-08-19 NOTE — TELEPHONE ENCOUNTER
Regarding: Catheter leakage   ----- Message from Suraj Leger RN sent at 8/19/2022 11:02 AM EDT -----  " I was seen in ED over the weekend for clogged catheter   It was replaced but now im having urine leaking into my underwear "

## 2022-08-19 NOTE — TELEPHONE ENCOUNTER
Reason for Disposition   Leakage of urine around catheter    Answer Assessment - Initial Assessment Questions  1  SYMPTOMS: "What symptoms are you concerned about?"      Catheter leakage  2  ONSET:  "When did the symptoms start?"      Last weekend  3  FEVER: "Is there a fever?" If Yes, ask: "What is the temperature, how was it measured, and when did it start?"      No  4  ABDOMINAL PAIN: "Is there any abdominal pain?" (e g , Scale 1-10; or mild, moderate, severe)      No  5  URINE COLOR: "What color is the urine?"  "Is there blood present in the urine?" (e g , clear, yellow, cloudy, tea-colored, blood streaks, bright red)      yellow  6  ONSET: "When was the catheter inserted?"      Over weekend  7  OTHER SYMPTOMS: "Do you have any other symptoms?" (e g , back pain, bad urine odor)       Blood clot a few days ago  8   PREGNANCY: "Is there any chance you are pregnant?" "When was your last menstrual period?"      No    Protocols used: URINARY CATHETER SYMPTOMS AND QUESTIONS-ADULT-[place]

## 2022-08-19 NOTE — TELEPHONE ENCOUNTER
Called and left VM asking for call back   Also advised patient we sent a medication that should help, but I would like like him to call us back

## 2022-09-16 ENCOUNTER — PROCEDURE VISIT (OUTPATIENT)
Dept: UROLOGY | Facility: CLINIC | Age: 67
End: 2022-09-16
Payer: MEDICARE

## 2022-09-16 VITALS
SYSTOLIC BLOOD PRESSURE: 180 MMHG | BODY MASS INDEX: 31.99 KG/M2 | WEIGHT: 216 LBS | HEART RATE: 95 BPM | OXYGEN SATURATION: 99 % | HEIGHT: 69 IN | RESPIRATION RATE: 16 BRPM | DIASTOLIC BLOOD PRESSURE: 88 MMHG

## 2022-09-16 DIAGNOSIS — R33.8 ACUTE RETENTION OF URINE: Primary | ICD-10-CM

## 2022-09-16 PROCEDURE — 51702 INSERT TEMP BLADDER CATH: CPT

## 2022-09-19 ENCOUNTER — OFFICE VISIT (OUTPATIENT)
Dept: FAMILY MEDICINE CLINIC | Facility: CLINIC | Age: 67
End: 2022-09-19
Payer: MEDICARE

## 2022-09-19 VITALS
HEART RATE: 100 BPM | RESPIRATION RATE: 18 BRPM | BODY MASS INDEX: 31.99 KG/M2 | HEIGHT: 69 IN | DIASTOLIC BLOOD PRESSURE: 60 MMHG | TEMPERATURE: 99.2 F | SYSTOLIC BLOOD PRESSURE: 136 MMHG | OXYGEN SATURATION: 97 % | WEIGHT: 216 LBS

## 2022-09-19 DIAGNOSIS — N13.8 ENLARGED PROSTATE WITH URINARY OBSTRUCTION: Primary | ICD-10-CM

## 2022-09-19 DIAGNOSIS — Z12.11 COLON CANCER SCREENING: ICD-10-CM

## 2022-09-19 DIAGNOSIS — N40.1 ENLARGED PROSTATE WITH URINARY OBSTRUCTION: Primary | ICD-10-CM

## 2022-09-19 DIAGNOSIS — R73.09 ABNORMAL GLUCOSE: ICD-10-CM

## 2022-09-19 DIAGNOSIS — E66.9 OBESITY (BMI 30-39.9): ICD-10-CM

## 2022-09-19 DIAGNOSIS — I10 PRIMARY HYPERTENSION: ICD-10-CM

## 2022-09-19 PROCEDURE — 93000 ELECTROCARDIOGRAM COMPLETE: CPT | Performed by: FAMILY MEDICINE

## 2022-09-19 PROCEDURE — 99214 OFFICE O/P EST MOD 30 MIN: CPT | Performed by: FAMILY MEDICINE

## 2022-09-19 NOTE — PROGRESS NOTES
Assessment/Plan:    No problem-specific Assessment & Plan notes found for this encounter  preop clearance ok  BPH with obstruction  ekg acceptable in office  PAT pending    htn stable on norvasc 10mg/d  No edema    Aware to take his amlodipine the am of surgery  Elevated glucose, he is working on diet, a1c acceptable w/o medications at this time     Diagnoses and all orders for this visit:    Enlarged prostate with urinary obstruction    Colon cancer screening  -     Ambulatory referral for colonoscopy; Future    Primary hypertension  -     POCT ECG    Obesity (BMI 30-39  9)    Abnormal glucose          Return if symptoms worsen or fail to improve  Subjective:      Patient ID: Shayne Duffy is a 69 y o  male  Chief Complaint   Patient presents with    Pre-op Exam     Sas cma       HPI    Planned procedure:    Surgeon:  Dr Rashard Khoury  Date:  10/14/22  Anesthesia type:  unstated    Prior major surgeries:  Left ankle ORIF, tonsillectomy  Problems with anesthesia in past:  Tachycardia after left ankle surgery, self resolved    Can walk 4 blocks or 2 flights of stairs:  y  History of excessive bleeding:  n  History of excessive clotting:  n  Personal history of DVT or Pe:  n    Taking NSAIDS:  n  Takings vitamins D or E or A or fish oil supplements:  n    Usual am medications:  Amlodipine 10mg/d    The following portions of the patient's history were reviewed and updated as appropriate: allergies, current medications, past family history, past medical history, past social history, past surgical history and problem list     Review of Systems   Constitutional: Negative for fever  Neurological: Negative for syncope           Current Outpatient Medications   Medication Sig Dispense Refill    amLODIPine (NORVASC) 10 mg tablet Take 1 tablet (10 mg total) by mouth daily 90 tablet 1    finasteride (PROSCAR) 5 mg tablet Take 1 tablet (5 mg total) by mouth daily 90 tablet 3    Multiple Vitamin (MULTIVITAMINS PO) Take by mouth Daily      oxybutynin (DITROPAN) 5 mg tablet Take 1 tablet (5 mg total) by mouth 3 (three) times a day as needed (as needed) 30 tablet 3    tamsulosin (FLOMAX) 0 4 mg Take 1 capsule (0 4 mg total) by mouth daily with dinner 90 capsule 1     No current facility-administered medications for this visit  Objective:    /60   Pulse 100   Temp 99 2 °F (37 3 °C)   Resp 18   Ht 5' 9" (1 753 m)   Wt 98 kg (216 lb)   SpO2 97%   BMI 31 90 kg/m²        Physical Exam  Vitals and nursing note reviewed  Constitutional:       Appearance: He is well-developed  He is obese  He is not ill-appearing  HENT:      Head: Normocephalic  Eyes:      General: No scleral icterus  Conjunctiva/sclera: Conjunctivae normal    Cardiovascular:      Rate and Rhythm: Normal rate and regular rhythm  Pulmonary:      Effort: Pulmonary effort is normal  No respiratory distress  Breath sounds: No wheezing or rales  Abdominal:      General: Bowel sounds are normal       Palpations: Abdomen is soft  Tenderness: There is no abdominal tenderness  There is no rebound  Musculoskeletal:         General: No deformity  Cervical back: Neck supple  Skin:     General: Skin is warm and dry  Coloration: Skin is not pale  Neurological:      Mental Status: He is alert  Motor: No weakness  Gait: Gait normal    Psychiatric:         Mood and Affect: Mood normal          Behavior: Behavior normal          Thought Content:  Thought content normal           ekg rbbb, 1st deg avb       Ginger Devine DO

## 2022-09-23 ENCOUNTER — APPOINTMENT (OUTPATIENT)
Dept: LAB | Facility: HOSPITAL | Age: 67
End: 2022-09-23
Payer: MEDICARE

## 2022-09-23 ENCOUNTER — LAB REQUISITION (OUTPATIENT)
Dept: LAB | Facility: HOSPITAL | Age: 67
End: 2022-09-23
Payer: MEDICARE

## 2022-09-23 DIAGNOSIS — N13.30 HYDROURETERONEPHROSIS: ICD-10-CM

## 2022-09-23 DIAGNOSIS — Z01.812 PRE-OPERATIVE LABORATORY EXAMINATION: ICD-10-CM

## 2022-09-23 DIAGNOSIS — N40.1 BENIGN PROSTATIC HYPERPLASIA WITH LOWER URINARY TRACT SYMPTOMS: ICD-10-CM

## 2022-09-23 DIAGNOSIS — R39.89 SUSPECTED UTI: ICD-10-CM

## 2022-09-23 DIAGNOSIS — N40.1 ENLARGED PROSTATE WITH URINARY OBSTRUCTION: ICD-10-CM

## 2022-09-23 DIAGNOSIS — R73.09 ABNORMAL GLUCOSE: ICD-10-CM

## 2022-09-23 DIAGNOSIS — N13.8 ENLARGED PROSTATE WITH URINARY OBSTRUCTION: ICD-10-CM

## 2022-09-23 DIAGNOSIS — Z79.01 LONG TERM (CURRENT) USE OF ANTICOAGULANTS: ICD-10-CM

## 2022-09-23 DIAGNOSIS — R73.01 IFG (IMPAIRED FASTING GLUCOSE): ICD-10-CM

## 2022-09-23 LAB
ABO GROUP BLD: NORMAL
ALBUMIN SERPL BCP-MCNC: 3.8 G/DL (ref 3.5–5)
ALP SERPL-CCNC: 81 U/L (ref 46–116)
ALT SERPL W P-5'-P-CCNC: 21 U/L (ref 12–78)
ANION GAP SERPL CALCULATED.3IONS-SCNC: 8 MMOL/L (ref 4–13)
APTT PPP: 37 SECONDS (ref 23–37)
AST SERPL W P-5'-P-CCNC: 14 U/L (ref 5–45)
BASOPHILS # BLD AUTO: 0.05 THOUSANDS/ΜL (ref 0–0.1)
BASOPHILS NFR BLD AUTO: 0 % (ref 0–1)
BILIRUB SERPL-MCNC: 1.05 MG/DL (ref 0.2–1)
BLD GP AB SCN SERPL QL: NEGATIVE
BUN SERPL-MCNC: 19 MG/DL (ref 5–25)
CALCIUM SERPL-MCNC: 9.8 MG/DL (ref 8.3–10.1)
CHLORIDE SERPL-SCNC: 101 MMOL/L (ref 96–108)
CO2 SERPL-SCNC: 31 MMOL/L (ref 21–32)
CREAT SERPL-MCNC: 1.1 MG/DL (ref 0.6–1.3)
EOSINOPHIL # BLD AUTO: 0.18 THOUSAND/ΜL (ref 0–0.61)
EOSINOPHIL NFR BLD AUTO: 2 % (ref 0–6)
ERYTHROCYTE [DISTWIDTH] IN BLOOD BY AUTOMATED COUNT: 14.1 % (ref 11.6–15.1)
EST. AVERAGE GLUCOSE BLD GHB EST-MCNC: 137 MG/DL
GFR SERPL CREATININE-BSD FRML MDRD: 68 ML/MIN/1.73SQ M
GLUCOSE P FAST SERPL-MCNC: 132 MG/DL (ref 65–99)
HBA1C MFR BLD: 6.4 %
HCT VFR BLD AUTO: 42.9 % (ref 36.5–49.3)
HGB BLD-MCNC: 14 G/DL (ref 12–17)
IMM GRANULOCYTES # BLD AUTO: 0.03 THOUSAND/UL (ref 0–0.2)
IMM GRANULOCYTES NFR BLD AUTO: 0 % (ref 0–2)
INR PPP: 0.96 (ref 0.84–1.19)
LYMPHOCYTES # BLD AUTO: 1.82 THOUSANDS/ΜL (ref 0.6–4.47)
LYMPHOCYTES NFR BLD AUTO: 16 % (ref 14–44)
MCH RBC QN AUTO: 28.3 PG (ref 26.8–34.3)
MCHC RBC AUTO-ENTMCNC: 32.6 G/DL (ref 31.4–37.4)
MCV RBC AUTO: 87 FL (ref 82–98)
MONOCYTES # BLD AUTO: 0.83 THOUSAND/ΜL (ref 0.17–1.22)
MONOCYTES NFR BLD AUTO: 7 % (ref 4–12)
NEUTROPHILS # BLD AUTO: 8.66 THOUSANDS/ΜL (ref 1.85–7.62)
NEUTS SEG NFR BLD AUTO: 75 % (ref 43–75)
NRBC BLD AUTO-RTO: 0 /100 WBCS
PLATELET # BLD AUTO: 258 THOUSANDS/UL (ref 149–390)
PMV BLD AUTO: 9.5 FL (ref 8.9–12.7)
POTASSIUM SERPL-SCNC: 4.5 MMOL/L (ref 3.5–5.3)
PROT SERPL-MCNC: 8.3 G/DL (ref 6.4–8.4)
PROTHROMBIN TIME: 12.9 SECONDS (ref 11.6–14.5)
RBC # BLD AUTO: 4.94 MILLION/UL (ref 3.88–5.62)
RH BLD: POSITIVE
SODIUM SERPL-SCNC: 140 MMOL/L (ref 135–147)
SPECIMEN EXPIRATION DATE: NORMAL
WBC # BLD AUTO: 11.57 THOUSAND/UL (ref 4.31–10.16)

## 2022-09-23 PROCEDURE — 87077 CULTURE AEROBIC IDENTIFY: CPT

## 2022-09-23 PROCEDURE — 85025 COMPLETE CBC W/AUTO DIFF WBC: CPT

## 2022-09-23 PROCEDURE — 85730 THROMBOPLASTIN TIME PARTIAL: CPT

## 2022-09-23 PROCEDURE — 36415 COLL VENOUS BLD VENIPUNCTURE: CPT

## 2022-09-23 PROCEDURE — 86850 RBC ANTIBODY SCREEN: CPT | Performed by: UROLOGY

## 2022-09-23 PROCEDURE — 86900 BLOOD TYPING SEROLOGIC ABO: CPT | Performed by: UROLOGY

## 2022-09-23 PROCEDURE — 83036 HEMOGLOBIN GLYCOSYLATED A1C: CPT

## 2022-09-23 PROCEDURE — 86901 BLOOD TYPING SEROLOGIC RH(D): CPT | Performed by: UROLOGY

## 2022-09-23 PROCEDURE — 80053 COMPREHEN METABOLIC PANEL: CPT

## 2022-09-23 PROCEDURE — 87086 URINE CULTURE/COLONY COUNT: CPT

## 2022-09-23 PROCEDURE — 87186 SC STD MICRODIL/AGAR DIL: CPT

## 2022-09-23 PROCEDURE — 85610 PROTHROMBIN TIME: CPT

## 2022-09-25 LAB
BACTERIA UR CULT: ABNORMAL
BACTERIA UR CULT: ABNORMAL

## 2022-09-26 ENCOUNTER — TELEPHONE (OUTPATIENT)
Dept: UROLOGY | Facility: CLINIC | Age: 67
End: 2022-09-26

## 2022-09-26 ENCOUNTER — TELEPHONE (OUTPATIENT)
Dept: FAMILY MEDICINE CLINIC | Facility: CLINIC | Age: 67
End: 2022-09-26

## 2022-09-26 DIAGNOSIS — R82.71 BACTERIURIA: Primary | ICD-10-CM

## 2022-09-26 LAB
BACTERIA UR CULT: ABNORMAL

## 2022-09-26 RX ORDER — SULFAMETHOXAZOLE AND TRIMETHOPRIM 800; 160 MG/1; MG/1
1 TABLET ORAL EVERY 12 HOURS SCHEDULED
Qty: 10 TABLET | Refills: 0 | Status: SHIPPED | OUTPATIENT
Start: 2022-09-26 | End: 2022-10-01

## 2022-09-26 NOTE — TELEPHONE ENCOUNTER
Please let Trinity Morrell know that I have sent him Bactrim to be started 5 days prior to surgery to ready him for TURP

## 2022-09-26 NOTE — TELEPHONE ENCOUNTER
Spoke with patient and informed him of Bactrim being sent to pharmacy  Advised him he should start it on 10/9/22  Patient verbalized understanding

## 2022-09-26 NOTE — TELEPHONE ENCOUNTER
Patient called today regarding PCP informed Patient of a Bladder Infection  Patient is Scheduled for TURP Procedure on October 14, 2022  Patient is concerned that he needs Antibiotics to clear up his Infection before his Procedure

## 2022-10-02 PROBLEM — Z71.2 ENCOUNTER TO DISCUSS TEST RESULTS: Status: RESOLVED | Noted: 2022-07-28 | Resolved: 2022-10-02

## 2022-10-02 PROBLEM — E66.01 OBESITY, MORBID (HCC): Status: RESOLVED | Noted: 2022-04-06 | Resolved: 2022-10-02

## 2022-10-03 ENCOUNTER — RA CDI HCC (OUTPATIENT)
Dept: OTHER | Facility: HOSPITAL | Age: 67
End: 2022-10-03

## 2022-10-10 RX ORDER — MULTIVIT WITH MINERALS/LUTEIN
1000 TABLET ORAL DAILY
COMMUNITY

## 2022-10-10 NOTE — PRE-PROCEDURE INSTRUCTIONS
Pre-Surgery Instructions:   Medication Instructions   • amLODIPine (NORVASC) 10 mg tablet Take day of surgery  • Ascorbic Acid (vitamin C) 1000 MG tablet Stop taking 7 days prior to surgery  • finasteride (PROSCAR) 5 mg tablet Take night before surgery   • Multiple Vitamin (MULTIVITAMINS PO) Stop taking 7 days prior to surgery  • oxybutynin (DITROPAN) 5 mg tablet Uses PRN- DO NOT take day of surgery   • tamsulosin (FLOMAX) 0 4 mg Take night before surgery    INSTR ON BABAK CALL,  REPORT LOC , BRING PHOTO ID/MED LIST/INS  INFO ,SHOWER REV , STOP ASA/NSAID/VIT 7 DAY PREOP, PT VERBALIZES UNDERSTANDING W/ NO FURTHER QUESTIONS

## 2022-10-11 PROBLEM — Z00.00 MEDICARE ANNUAL WELLNESS VISIT, SUBSEQUENT: Status: RESOLVED | Noted: 2022-03-31 | Resolved: 2022-10-11

## 2022-10-12 PROBLEM — Z12.5 PROSTATE CANCER SCREENING: Status: RESOLVED | Noted: 2021-01-29 | Resolved: 2022-10-12

## 2022-10-12 PROBLEM — Z12.11 COLON CANCER SCREENING: Status: RESOLVED | Noted: 2021-01-29 | Resolved: 2022-10-12

## 2022-10-12 PROBLEM — Z11.59 NEED FOR HEPATITIS C SCREENING TEST: Status: RESOLVED | Noted: 2021-01-29 | Resolved: 2022-10-12

## 2022-10-12 PROBLEM — Z13.89 SCREENING FOR CARDIOVASCULAR, RESPIRATORY, AND GENITOURINARY DISEASES: Status: RESOLVED | Noted: 2021-01-29 | Resolved: 2022-10-12

## 2022-10-12 PROBLEM — Z13.83 SCREENING FOR CARDIOVASCULAR, RESPIRATORY, AND GENITOURINARY DISEASES: Status: RESOLVED | Noted: 2021-01-29 | Resolved: 2022-10-12

## 2022-10-12 PROBLEM — Z13.6 SCREENING FOR CARDIOVASCULAR, RESPIRATORY, AND GENITOURINARY DISEASES: Status: RESOLVED | Noted: 2021-01-29 | Resolved: 2022-10-12

## 2022-10-12 PROBLEM — Z13.1 SCREENING FOR DIABETES MELLITUS (DM): Status: RESOLVED | Noted: 2021-01-29 | Resolved: 2022-10-12

## 2022-10-14 ENCOUNTER — HOSPITAL ENCOUNTER (INPATIENT)
Facility: HOSPITAL | Age: 67
LOS: 3 days | Discharge: HOME HEALTH | End: 2022-10-17
Attending: UROLOGY | Admitting: FAMILY MEDICINE
Payer: MEDICARE

## 2022-10-14 ENCOUNTER — TELEPHONE (OUTPATIENT)
Dept: UROLOGY | Facility: CLINIC | Age: 67
End: 2022-10-14

## 2022-10-14 ENCOUNTER — PREP FOR PROCEDURE (OUTPATIENT)
Dept: UROLOGY | Facility: CLINIC | Age: 67
End: 2022-10-14

## 2022-10-14 ENCOUNTER — APPOINTMENT (OUTPATIENT)
Dept: RADIOLOGY | Facility: HOSPITAL | Age: 67
End: 2022-10-14
Attending: FAMILY MEDICINE
Payer: MEDICARE

## 2022-10-14 ENCOUNTER — ANESTHESIA (OUTPATIENT)
Dept: PERIOP | Facility: HOSPITAL | Age: 67
End: 2022-10-14
Payer: MEDICARE

## 2022-10-14 ENCOUNTER — ANESTHESIA EVENT (OUTPATIENT)
Dept: PERIOP | Facility: HOSPITAL | Age: 67
End: 2022-10-14
Payer: MEDICARE

## 2022-10-14 DIAGNOSIS — Z91.89 FRAMINGHAM CARDIAC RISK 10-20% IN NEXT 10 YEARS: ICD-10-CM

## 2022-10-14 DIAGNOSIS — N40.1 ENLARGED PROSTATE WITH URINARY OBSTRUCTION: Primary | ICD-10-CM

## 2022-10-14 DIAGNOSIS — N18.31 STAGE 3A CHRONIC KIDNEY DISEASE (HCC): ICD-10-CM

## 2022-10-14 DIAGNOSIS — N39.0 COMPLICATED UTI (URINARY TRACT INFECTION): ICD-10-CM

## 2022-10-14 DIAGNOSIS — R33.8 URINARY RETENTION DUE TO BENIGN PROSTATIC HYPERPLASIA: Primary | ICD-10-CM

## 2022-10-14 DIAGNOSIS — N13.9 URINARY (TRACT) OBSTRUCTION: Primary | ICD-10-CM

## 2022-10-14 DIAGNOSIS — N13.8 ENLARGED PROSTATE WITH URINARY OBSTRUCTION: Primary | ICD-10-CM

## 2022-10-14 DIAGNOSIS — N40.1 URINARY RETENTION DUE TO BENIGN PROSTATIC HYPERPLASIA: Primary | ICD-10-CM

## 2022-10-14 DIAGNOSIS — R00.0 TACHYCARDIA: ICD-10-CM

## 2022-10-14 PROBLEM — E87.1 HYPONATREMIA: Status: ACTIVE | Noted: 2022-10-14

## 2022-10-14 PROBLEM — R50.9 FEVER: Status: ACTIVE | Noted: 2022-10-14

## 2022-10-14 PROBLEM — A41.9 SEPSIS (HCC): Status: ACTIVE | Noted: 2022-10-14

## 2022-10-14 LAB
ABO GROUP BLD: NORMAL
ALBUMIN SERPL BCP-MCNC: 3.3 G/DL (ref 3.5–5)
ALP SERPL-CCNC: 70 U/L (ref 46–116)
ALT SERPL W P-5'-P-CCNC: 21 U/L (ref 12–78)
ANION GAP SERPL CALCULATED.3IONS-SCNC: 11 MMOL/L (ref 4–13)
AST SERPL W P-5'-P-CCNC: 19 U/L (ref 5–45)
BACTERIA UR QL AUTO: ABNORMAL /HPF
BASOPHILS # BLD AUTO: 0.02 THOUSANDS/ÂΜL (ref 0–0.1)
BASOPHILS NFR BLD AUTO: 0 % (ref 0–1)
BILIRUB SERPL-MCNC: 1.51 MG/DL (ref 0.2–1)
BILIRUB UR QL STRIP: NEGATIVE
BUN SERPL-MCNC: 18 MG/DL (ref 5–25)
CALCIUM ALBUM COR SERPL-MCNC: 10.2 MG/DL (ref 8.3–10.1)
CALCIUM SERPL-MCNC: 9.6 MG/DL (ref 8.3–10.1)
CAOX CRY URNS QL MICRO: ABNORMAL /HPF
CHLORIDE SERPL-SCNC: 99 MMOL/L (ref 96–108)
CLARITY UR: CLEAR
CO2 SERPL-SCNC: 20 MMOL/L (ref 21–32)
COLOR UR: YELLOW
CREAT SERPL-MCNC: 1.52 MG/DL (ref 0.6–1.3)
EOSINOPHIL # BLD AUTO: 0 THOUSAND/ÂΜL (ref 0–0.61)
EOSINOPHIL NFR BLD AUTO: 0 % (ref 0–6)
ERYTHROCYTE [DISTWIDTH] IN BLOOD BY AUTOMATED COUNT: 15.3 % (ref 11.6–15.1)
GFR SERPL CREATININE-BSD FRML MDRD: 46 ML/MIN/1.73SQ M
GLUCOSE SERPL-MCNC: 164 MG/DL (ref 65–140)
GLUCOSE SERPL-MCNC: 164 MG/DL (ref 65–140)
GLUCOSE UR STRIP-MCNC: NEGATIVE MG/DL
HCT VFR BLD AUTO: 45.8 % (ref 36.5–49.3)
HGB BLD-MCNC: 15.2 G/DL (ref 12–17)
HGB UR QL STRIP.AUTO: ABNORMAL
IMM GRANULOCYTES # BLD AUTO: 0.04 THOUSAND/UL (ref 0–0.2)
IMM GRANULOCYTES NFR BLD AUTO: 1 % (ref 0–2)
KETONES UR STRIP-MCNC: ABNORMAL MG/DL
LACTATE SERPL-SCNC: 3.7 MMOL/L (ref 0.5–2)
LEUKOCYTE ESTERASE UR QL STRIP: ABNORMAL
LYMPHOCYTES # BLD AUTO: 0.37 THOUSANDS/ÂΜL (ref 0.6–4.47)
LYMPHOCYTES NFR BLD AUTO: 5 % (ref 14–44)
MCH RBC QN AUTO: 28.5 PG (ref 26.8–34.3)
MCHC RBC AUTO-ENTMCNC: 33.2 G/DL (ref 31.4–37.4)
MCV RBC AUTO: 86 FL (ref 82–98)
MONOCYTES # BLD AUTO: 0.26 THOUSAND/ÂΜL (ref 0.17–1.22)
MONOCYTES NFR BLD AUTO: 3 % (ref 4–12)
NEUTROPHILS # BLD AUTO: 6.93 THOUSANDS/ÂΜL (ref 1.85–7.62)
NEUTS SEG NFR BLD AUTO: 91 % (ref 43–75)
NITRITE UR QL STRIP: NEGATIVE
NON-SQ EPI CELLS URNS QL MICRO: ABNORMAL /HPF
NRBC BLD AUTO-RTO: 0 /100 WBCS
PH UR STRIP.AUTO: 5.5 [PH]
PLATELET # BLD AUTO: 155 THOUSANDS/UL (ref 149–390)
PMV BLD AUTO: 10.1 FL (ref 8.9–12.7)
POTASSIUM SERPL-SCNC: 3.7 MMOL/L (ref 3.5–5.3)
PROCALCITONIN SERPL-MCNC: 0.59 NG/ML
PROT SERPL-MCNC: 8.6 G/DL (ref 6.4–8.4)
PROT UR STRIP-MCNC: ABNORMAL MG/DL
RBC # BLD AUTO: 5.33 MILLION/UL (ref 3.88–5.62)
RBC #/AREA URNS AUTO: ABNORMAL /HPF
RH BLD: POSITIVE
SODIUM SERPL-SCNC: 130 MMOL/L (ref 135–147)
SP GR UR STRIP.AUTO: 1.02 (ref 1–1.03)
UROBILINOGEN UR STRIP-ACNC: <2 MG/DL
WBC # BLD AUTO: 7.62 THOUSAND/UL (ref 4.31–10.16)
WBC #/AREA URNS AUTO: ABNORMAL /HPF

## 2022-10-14 PROCEDURE — 87040 BLOOD CULTURE FOR BACTERIA: CPT | Performed by: FAMILY MEDICINE

## 2022-10-14 PROCEDURE — 82948 REAGENT STRIP/BLOOD GLUCOSE: CPT

## 2022-10-14 PROCEDURE — 83605 ASSAY OF LACTIC ACID: CPT | Performed by: FAMILY MEDICINE

## 2022-10-14 PROCEDURE — 71045 X-RAY EXAM CHEST 1 VIEW: CPT

## 2022-10-14 PROCEDURE — 84145 PROCALCITONIN (PCT): CPT | Performed by: FAMILY MEDICINE

## 2022-10-14 PROCEDURE — 85025 COMPLETE CBC W/AUTO DIFF WBC: CPT | Performed by: FAMILY MEDICINE

## 2022-10-14 PROCEDURE — 99223 1ST HOSP IP/OBS HIGH 75: CPT | Performed by: FAMILY MEDICINE

## 2022-10-14 PROCEDURE — 80053 COMPREHEN METABOLIC PANEL: CPT | Performed by: FAMILY MEDICINE

## 2022-10-14 PROCEDURE — 81001 URINALYSIS AUTO W/SCOPE: CPT | Performed by: FAMILY MEDICINE

## 2022-10-14 RX ORDER — ONDANSETRON 2 MG/ML
4 INJECTION INTRAMUSCULAR; INTRAVENOUS EVERY 6 HOURS PRN
Status: DISCONTINUED | OUTPATIENT
Start: 2022-10-14 | End: 2022-10-17 | Stop reason: HOSPADM

## 2022-10-14 RX ORDER — CEFAZOLIN SODIUM 2 G/50ML
2000 SOLUTION INTRAVENOUS ONCE
Status: DISCONTINUED | OUTPATIENT
Start: 2022-10-14 | End: 2022-10-14 | Stop reason: HOSPADM

## 2022-10-14 RX ORDER — FINASTERIDE 5 MG/1
5 TABLET, FILM COATED ORAL DAILY
Status: DISCONTINUED | OUTPATIENT
Start: 2022-10-14 | End: 2022-10-17 | Stop reason: HOSPADM

## 2022-10-14 RX ORDER — ACETAMINOPHEN 325 MG/1
975 TABLET ORAL ONCE
OUTPATIENT
Start: 2022-10-14 | End: 2022-10-14

## 2022-10-14 RX ORDER — LEVOFLOXACIN 5 MG/ML
750 INJECTION, SOLUTION INTRAVENOUS EVERY 24 HOURS
Status: DISCONTINUED | OUTPATIENT
Start: 2022-10-15 | End: 2022-10-17 | Stop reason: HOSPADM

## 2022-10-14 RX ORDER — ENOXAPARIN SODIUM 100 MG/ML
40 INJECTION SUBCUTANEOUS DAILY
Status: DISCONTINUED | OUTPATIENT
Start: 2022-10-14 | End: 2022-10-17 | Stop reason: HOSPADM

## 2022-10-14 RX ORDER — ACETAMINOPHEN 325 MG/1
650 TABLET ORAL EVERY 6 HOURS PRN
Status: DISCONTINUED | OUTPATIENT
Start: 2022-10-14 | End: 2022-10-17 | Stop reason: HOSPADM

## 2022-10-14 RX ORDER — SODIUM CHLORIDE, SODIUM LACTATE, POTASSIUM CHLORIDE, CALCIUM CHLORIDE 600; 310; 30; 20 MG/100ML; MG/100ML; MG/100ML; MG/100ML
100 INJECTION, SOLUTION INTRAVENOUS CONTINUOUS
Status: DISCONTINUED | OUTPATIENT
Start: 2022-10-14 | End: 2022-10-14

## 2022-10-14 RX ORDER — SODIUM CHLORIDE, SODIUM GLUCONATE, SODIUM ACETATE, POTASSIUM CHLORIDE, MAGNESIUM CHLORIDE, SODIUM PHOSPHATE, DIBASIC, AND POTASSIUM PHOSPHATE .53; .5; .37; .037; .03; .012; .00082 G/100ML; G/100ML; G/100ML; G/100ML; G/100ML; G/100ML; G/100ML
1000 INJECTION, SOLUTION INTRAVENOUS ONCE
Status: COMPLETED | OUTPATIENT
Start: 2022-10-14 | End: 2022-10-14

## 2022-10-14 RX ORDER — TAMSULOSIN HYDROCHLORIDE 0.4 MG/1
0.4 CAPSULE ORAL
Status: DISCONTINUED | OUTPATIENT
Start: 2022-10-14 | End: 2022-10-17 | Stop reason: HOSPADM

## 2022-10-14 RX ORDER — ACETAMINOPHEN 325 MG/1
975 TABLET ORAL ONCE
Status: COMPLETED | OUTPATIENT
Start: 2022-10-14 | End: 2022-10-14

## 2022-10-14 RX ORDER — AMLODIPINE BESYLATE 10 MG/1
10 TABLET ORAL DAILY
Status: DISCONTINUED | OUTPATIENT
Start: 2022-10-15 | End: 2022-10-17 | Stop reason: HOSPADM

## 2022-10-14 RX ORDER — LEVOFLOXACIN 5 MG/ML
750 INJECTION, SOLUTION INTRAVENOUS ONCE
Status: COMPLETED | OUTPATIENT
Start: 2022-10-14 | End: 2022-10-14

## 2022-10-14 RX ADMIN — ACETAMINOPHEN 650 MG: 325 TABLET ORAL at 16:21

## 2022-10-14 RX ADMIN — SODIUM CHLORIDE, SODIUM GLUCONATE, SODIUM ACETATE, POTASSIUM CHLORIDE, MAGNESIUM CHLORIDE, SODIUM PHOSPHATE, DIBASIC, AND POTASSIUM PHOSPHATE 1000 ML: .53; .5; .37; .037; .03; .012; .00082 INJECTION, SOLUTION INTRAVENOUS at 19:27

## 2022-10-14 RX ADMIN — ACETAMINOPHEN 650 MG: 325 TABLET ORAL at 23:07

## 2022-10-14 RX ADMIN — SODIUM CHLORIDE, SODIUM LACTATE, POTASSIUM CHLORIDE, AND CALCIUM CHLORIDE 125 ML/HR: .6; .31; .03; .02 INJECTION, SOLUTION INTRAVENOUS at 09:49

## 2022-10-14 RX ADMIN — TAMSULOSIN HYDROCHLORIDE 0.4 MG: 0.4 CAPSULE ORAL at 15:40

## 2022-10-14 RX ADMIN — ONDANSETRON 4 MG: 2 INJECTION INTRAMUSCULAR; INTRAVENOUS at 15:38

## 2022-10-14 RX ADMIN — SODIUM CHLORIDE 1000 ML: 0.9 INJECTION, SOLUTION INTRAVENOUS at 15:29

## 2022-10-14 RX ADMIN — ACETAMINOPHEN 975 MG: 325 TABLET, FILM COATED ORAL at 10:09

## 2022-10-14 RX ADMIN — LEVOFLOXACIN 750 MG: 750 INJECTION, SOLUTION INTRAVENOUS at 12:08

## 2022-10-14 NOTE — H&P
[address] 1955, 69 y o  male MRN: [id number]  Unit/Bed#: -01 Encounter: [id number]  Primary Care Provider: Tavares Cruz DO   Date and time admitted to hospital: 10/14/2022  8:56 AM    * Fever  Assessment & Plan  Patient with history of enlarged prostate, acute urinary obstruction, presented to OR for elective TURP surgery with urology service  However he was found to be febrile and tachycardic  Therefore surgery is canceled and patient is a direct admit under Medicine Service  Upon arrival to [facility], fever has resolved, tachycardia has resolved  Start sepsis workup with CBC, CMP, lactic acid, procalcitonin, blood culture x2, urinalysis with reflex to culture, and chest x-ray  Time-out performed with charge nurse and staff nurse for urinalysis with reflex to culture  Very high suspicion of complicated UTI  Will administer 1 L IV fluid bolus  As per urology recommendation, will continue Levaquin IV    Complicated UTI (urinary tract infection)  Assessment & Plan  Plan as per above    Stage 3a chronic kidney disease [facility])  Assessment & Plan  Lab Results   Component Value Date    EGFR 68 09/23/2022    EGFR 70 07/21/2022    EGFR 56 04/20/2022    CREATININE 1 10 09/23/2022    CREATININE 1 13 07/21/2022    CREATININE 1 29 04/20/2022     Obtain CMP    Primary hypertension  Assessment & Plan  Resume amlodipine    Varicose veins of both lower extremities  Assessment & Plan  Patient has history of varicose vein with bilateral trace edema    Compression stockings    Enlarged prostate with urinary obstruction  Assessment & Plan  Patient currently has Reyes catheter in place, which was exchanged today by Urology  Continue Flomax and Proscar    VTE Prophylaxis: Enoxaparin (Lovenox)  / sequential compression device   Code Status:  Full code    Discussion with family:  Wife is present at bedside    Anticipated Length of Stay:  Patient will be admitted on an Inpatient basis with an anticipated length of stay of  > 2 midnights  Justification for Hospital Stay:  IV antibiotic    Total Time for Visit, including Counseling / Coordination of Care: 60 minutes  Greater than 50% of this total time spent on direct patient counseling and coordination of care  Chief Complaint:   Fever, direct admit from OR    History of Present Illness:    Fe Jimenez is a 69 y o  male with past medical history of enlarged prostate resulting in acute urinary obstruction status post Reyes catheter placement presented to OR today for scheduled TURP with Dr Isela Hudson  Unfortunately it was noted that patient had a fever of 100 8 and tachycardia of 120 concerning for complicated UTI  Therefore the surgery is canceled and direct admission was requested under Medicine Service  On my exam patient is alert, oriented, denies any chills, tremors, nausea, vomiting or abdominal pain  Dark alessandra urine noted in the Reyes bag  No other concern or complaint  Patient's T-max was 100 8°, pulse 120  Last vitals revealed blood pressure 129/75, temperature 98 2°, heart rate in 98  Review of Systems:    Review of Systems   Constitutional: Positive for fever  Negative for chills  HENT: Negative for ear pain and sore throat  Eyes: Negative for pain and visual disturbance  Respiratory: Negative for cough and shortness of breath  Cardiovascular: Negative for chest pain and palpitations  Gastrointestinal: Negative for abdominal pain and vomiting  Genitourinary: Negative for dysuria and hematuria  Musculoskeletal: Negative for arthralgias and back pain  Skin: Negative for color change and rash  Neurological: Negative for seizures and syncope  All other systems reviewed and are negative        Past Medical and Surgical History:     Past Medical History:   Diagnosis Date   • Abnormal weight gain 06/12/2008    LAST ASSESSED: 6/12/08   • Anal fissure 03/05/2010    LAST ASSESSED: 3/5/10   • Diabetes mellitus ([facility] [address] )    • Enlarged prostate    • Hypertension    • Indwelling catheter present on admission    • Varicose veins of both lower extremities        Past Surgical History:   Procedure Laterality Date   • ANKLE FRACTURE SURGERY      has plate & screws   • TONSILLECTOMY         Meds/Allergies:    Prior to Admission medications    Medication Sig Start Date End Date Taking? Authorizing Provider   amLODIPine (NORVASC) 10 mg tablet Take 1 tablet (10 mg total) by mouth daily 7/22/22 10/14/22 Yes Anson Feliz DO   Ascorbic Acid (vitamin C) 1000 MG tablet Take 1,000 mg by mouth daily   Yes Historical Provider, MD   finasteride (PROSCAR) 5 mg tablet Take 1 tablet (5 mg total) by mouth daily  Patient taking differently: Take 5 mg by mouth daily after dinner 7/29/22 10/27/22 Yes Eduardo Mccallum MD   Multiple Vitamin (MULTIVITAMINS PO) Take by mouth Daily   Yes Historical Provider, MD   tamsulosin (FLOMAX) 0 4 mg Take 1 capsule (0 4 mg total) by mouth daily with dinner 7/22/22 10/14/22 Yes Anson Feliz DO   oxybutynin (DITROPAN) 5 mg tablet Take 1 tablet (5 mg total) by mouth 3 (three) times a day as needed (as needed) 22   ELIEL Mancera     I have reviewed home medications using allscripts      Allergies: No Known Allergies    Social History:     Marital Status: /Civil Union     Substance Use History:   Social History     Substance and Sexual Activity   Alcohol Use Never     Social History     Tobacco Use   Smoking Status Former Smoker   • Types: Cigarettes   • Quit date:    • Years since quittin 8   Smokeless Tobacco Never Used     Social History     Substance and Sexual Activity   Drug Use Never       Family History:    Family History   Problem Relation Age of Onset   • Diabetes Mother         MELLITUS   • Hypertension Mother        Physical Exam:     Vitals:   Blood Pressure: 129/75 (10/14/22 1348)  Pulse: 98 (10/14/22 1348)  Temperature: 98 2 °F (36 8 °C) (10/14/22 1348)  Temp Source: Temporal (10/14/22 1158)  Respirations: 20 (10/14/22 1158)  Height: 5' 9" (175 3 cm) (10/14/22 0936)  Weight - Scale: 98 kg (216 lb) (10/14/22 0936)  SpO2: 97 % (10/14/22 1348)    Physical Exam  Vitals and nursing note reviewed  Constitutional:       Appearance: He is well-developed  HENT:      Head: Normocephalic and atraumatic  Eyes:      Conjunctiva/sclera: Conjunctivae normal    Cardiovascular:      Rate and Rhythm: Normal rate and regular rhythm  Heart sounds: No murmur heard  Pulmonary:      Effort: Pulmonary effort is normal  No respiratory distress  Breath sounds: Normal breath sounds  Abdominal:      Palpations: Abdomen is soft  Tenderness: There is no abdominal tenderness  Musculoskeletal:      Cervical back: Neck supple  Skin:     General: Skin is warm and dry  Neurological:      Mental Status: He is alert  Results from last 7 days   Lab Units 10/14/22  0945   POC GLUCOSE mg/dl 164*                 XR chest portable    (Results Pending)       EKG, Pathology, and Other Studies Reviewed on Admission:   · EKG:  None this admission    Allscripts / Epic Records Reviewed: Yes     ** Please Note: This note has been constructed using a voice recognition system   **

## 2022-10-14 NOTE — ASSESSMENT & PLAN NOTE
Patient currently has Reyes catheter in place, which was exchanged today by Urology  Continue Flomax and Proscar

## 2022-10-14 NOTE — ASSESSMENT & PLAN NOTE
Lab Results   Component Value Date    EGFR 68 09/23/2022    EGFR 70 07/21/2022    EGFR 56 04/20/2022    CREATININE 1 10 09/23/2022    CREATININE 1 13 07/21/2022    CREATININE 1 29 04/20/2022     Obtain CMP

## 2022-10-14 NOTE — NURSING NOTE
Pt prepared for TURP and waiting for surgery - no symptoms  1120: reported feeling warm, diaphoretic, clammy  VS checked - 100 8 tympanic temp  Texts sent to Yessenia Gutierrez sees pt  Later, Dr Larry Hoawrd examined pt and changed nathan  Levaquin hung IV  (Ancef to be hung next )  Surgery cancelled and to be admitted to [facility] 87 floor

## 2022-10-14 NOTE — PLAN OF CARE
Problem: Potential for Falls  Goal: Patient will remain free of falls  Description: INTERVENTIONS:  - Educate patient/family on patient safety including physical limitations  - Instruct patient to call for assistance with activity   - Consult OT/PT to assist with strengthening/mobility   - Keep Call bell within reach  - Keep bed low and locked with side rails adjusted as appropriate  - Keep care items and personal belongings within reach  - Initiate and maintain comfort rounds  - Make Fall Risk Sign visible to staff  - Offer Toileting every 2 Hours, in advance of need  - Initiate/Maintain alarm  - Obtain necessary fall risk management equipment  - Apply yellow socks and bracelet for high fall risk patients  - Consider moving patient to room near nurses station  Outcome: Progressing     Problem: GENITOURINARY - ADULT  Goal: Maintains or returns to baseline urinary function  Description: INTERVENTIONS:  - Assess urinary function  - Encourage oral fluids to ensure adequate hydration if ordered  - Administer IV fluids as ordered to ensure adequate hydration  - Administer ordered medications as needed  - Offer frequent toileting  - Follow urinary retention protocol if ordered  Outcome: Progressing  Goal: Absence of urinary retention  Description: INTERVENTIONS:  - Assess patient’s ability to void and empty bladder  - Monitor I/O  - Bladder scan as needed  - Discuss with physician/AP medications to alleviate retention as needed  - Discuss catheterization for long term situations as appropriate  Outcome: Progressing  Goal: Urinary catheter remains patent  Description: INTERVENTIONS:  - Assess patency of urinary catheter  - If patient has a chronic nathan, consider changing catheter if non-functioning  - Follow guidelines for intermittent irrigation of non-functioning urinary catheter  Outcome: Progressing     Problem: METABOLIC, FLUID AND ELECTROLYTES - ADULT  Goal: Electrolytes maintained within normal limits  Description: INTERVENTIONS:  - Monitor labs and assess patient for signs and symptoms of electrolyte imbalances  - Administer electrolyte replacement as ordered  - Monitor response to electrolyte replacements, including repeat lab results as appropriate  - Instruct patient on fluid and nutrition as appropriate  Outcome: Progressing  Goal: Fluid balance maintained  Description: INTERVENTIONS:  - Monitor labs   - Monitor I/O and WT  - Instruct patient on fluid and nutrition as appropriate  - Assess for signs & symptoms of volume excess or deficit  Outcome: Progressing     Problem: HEMATOLOGIC - ADULT  Goal: Maintains hematologic stability  Description: INTERVENTIONS  - Assess for signs and symptoms of bleeding or hemorrhage  - Monitor labs  - Administer supportive blood products/factors as ordered and appropriate  Outcome: Progressing     Problem: PAIN - ADULT  Goal: Verbalizes/displays adequate comfort level or baseline comfort level  Description: Interventions:  - Encourage patient to monitor pain and request assistance  - Assess pain using appropriate pain scale  - Administer analgesics based on type and severity of pain and evaluate response  - Implement non-pharmacological measures as appropriate and evaluate response  - Consider cultural and social influences on pain and pain management  - Notify physician/advanced practitioner if interventions unsuccessful or patient reports new pain  Outcome: Progressing     Problem: INFECTION - ADULT  Goal: Absence or prevention of progression during hospitalization  Description: INTERVENTIONS:  - Assess and monitor for signs and symptoms of infection  - Monitor lab/diagnostic results  - Monitor all insertion sites, i e  indwelling lines, tubes, and drains  - Monitor endotracheal if appropriate and nasal secretions for changes in amount and color  - [place] appropriate cooling/warming therapies per order  - Administer medications as ordered  - Instruct and encourage patient and family to use good hand hygiene technique  - Identify and instruct in appropriate isolation precautions for identified infection/condition  Outcome: Progressing

## 2022-10-14 NOTE — ASSESSMENT & PLAN NOTE
Patient with history of enlarged prostate, acute urinary obstruction, presented to OR for elective TURP surgery with urology service  However he was found to be febrile and tachycardic  Therefore surgery is canceled and patient is a direct admit under Medicine Service  Upon arrival to Med surge, fever has resolved, tachycardia has resolved  Start sepsis workup with CBC, CMP, lactic acid, procalcitonin, blood culture x2, urinalysis with reflex to culture, and chest x-ray  Time-out performed with charge nurse and staff nurse for urinalysis with reflex to culture  Very high suspicion of complicated UTI    Will administer 1 L IV fluid bolus  As per urology recommendation, will continue Levaquin IV

## 2022-10-14 NOTE — ANESTHESIA PREPROCEDURE EVALUATION
Procedure:  TRANSURETHRAL RESECTION OF PROSTATE (TURP) (N/A Abdomen)    Relevant Problems   CARDIO   (+) Primary hypertension      GI/HEPATIC  NPO confirmed   (-) Gastroesophageal reflux disease      /RENAL   (+) Enlarged prostate with urinary obstruction   (+) Hydroureteronephrosis   (+) Stage 3a chronic kidney disease (HCC)     Current Outpatient Medications   Medication Instructions   • amLODIPine (NORVASC) 10 mg, Oral, Daily   • finasteride (PROSCAR) 5 mg, Oral, Daily   • Multiple Vitamin (MULTIVITAMINS PO) Oral, Daily   • oxybutynin (DITROPAN) 5 mg, Oral, 3 times daily PRN   • tamsulosin (FLOMAX) 0 4 mg, Oral, Daily with dinner   • vitamin C 1,000 mg, Oral, Daily     Lab Results   Component Value Date    WBC 11 57 (H) 09/23/2022    HGB 14 0 09/23/2022    HCT 42 9 09/23/2022     09/23/2022    SODIUM 140 09/23/2022    K 4 5 09/23/2022     09/23/2022    CO2 31 09/23/2022    BUN 19 09/23/2022    CREATININE 1 10 09/23/2022    GLUC 132 (H) 07/21/2022    PROTIME 12 9 09/23/2022    PTT 37 09/23/2022    INR 0 96 09/23/2022          Physical Exam    Airway    Mallampati score: I  TM Distance: >3 FB  Neck ROM: full     Dental   Comment: Poor dentition,     Cardiovascular  Rhythm: regular, Rate: normal, Cardiovascular exam normal    Pulmonary  Pulmonary exam normal     Other Findings        Anesthesia Plan  ASA Score- 3     Anesthesia Type- general with ASA Monitors  Additional Monitors:   Airway Plan: ETT  Plan Factors-Exercise tolerance (METS): >4 METS  Chart reviewed  EKG reviewed  Imaging results reviewed  Existing labs reviewed  Patient summary reviewed  Patient is not a current smoker  Induction- intravenous  Postoperative Plan- Plan for postoperative opioid use  Informed Consent- Anesthetic plan and risks discussed with patient  I personally reviewed this patient with the CRNA  Discussed and agreed on the Anesthesia Plan with the CRNA  Brian Sewell

## 2022-10-14 NOTE — TELEPHONE ENCOUNTER
Tyler had tachycardia and fever upon presentation for TURP  Please look to get him rescheduled  with a repeat urine culture next week in preparation

## 2022-10-14 NOTE — QUICK NOTE
I was called about this patient having a fever and tachycardia in the holding area  The initial plan was for TURP today  I came to the holding area and evaluated him and he told me he was feeling cold and clammy with some chills  Evaluation of his vital signs show tachycardia between 118-120  T-max is 100 8 degrees, even after administration of preoperative acetaminophen  His abdomen is soft, he has no signs of Cheri's gangrene  I have exchange his Reyes catheter, he now has a fresh 18 [place] coude catheter placement a sterile fashion with a clean drainage bag  As per his preoperative cultures I am treating him with cefazolin and Levaquin  Given that he does remain tachycardic I recommend evaluation by the [address] [place] [address] Internal Medicine Service for admission to hospital for management of complicated UTI, tachycardia, and concern for sepsis due to urinary source      His TURP is being canceled, of course, given this development

## 2022-10-15 PROBLEM — R65.20 SEVERE SEPSIS (HCC): Status: ACTIVE | Noted: 2022-10-14

## 2022-10-15 LAB
LACTATE SERPL-SCNC: 1.8 MMOL/L (ref 0.5–2)
PROCALCITONIN SERPL-MCNC: 3.36 NG/ML

## 2022-10-15 PROCEDURE — 83605 ASSAY OF LACTIC ACID: CPT | Performed by: FAMILY MEDICINE

## 2022-10-15 PROCEDURE — 84145 PROCALCITONIN (PCT): CPT | Performed by: FAMILY MEDICINE

## 2022-10-15 PROCEDURE — 87493 C DIFF AMPLIFIED PROBE: CPT | Performed by: PHYSICIAN ASSISTANT

## 2022-10-15 PROCEDURE — 99232 SBSQ HOSP IP/OBS MODERATE 35: CPT | Performed by: UROLOGY

## 2022-10-15 PROCEDURE — 99232 SBSQ HOSP IP/OBS MODERATE 35: CPT | Performed by: PHYSICIAN ASSISTANT

## 2022-10-15 RX ADMIN — AMLODIPINE BESYLATE 10 MG: 10 TABLET ORAL at 08:30

## 2022-10-15 RX ADMIN — ACETAMINOPHEN 650 MG: 325 TABLET ORAL at 08:37

## 2022-10-15 RX ADMIN — LEVOFLOXACIN 750 MG: 750 INJECTION, SOLUTION INTRAVENOUS at 11:54

## 2022-10-15 RX ADMIN — FINASTERIDE 5 MG: 5 TABLET, FILM COATED ORAL at 08:30

## 2022-10-15 RX ADMIN — TAMSULOSIN HYDROCHLORIDE 0.4 MG: 0.4 CAPSULE ORAL at 17:35

## 2022-10-15 RX ADMIN — ENOXAPARIN SODIUM 40 MG: 40 INJECTION SUBCUTANEOUS at 08:30

## 2022-10-15 NOTE — ASSESSMENT & PLAN NOTE
· POA, evidenced by fever, tachycardia, elevated lactic acid, and presumed complicated UTI while in OR holding area prior to TURP  Procedure was cancelled   · Prior UCx with E  Coli and K   Pneumoniae sensitive to Levaquin   · Lactic acid resolved, fever curve improving  · Follow up blood cultures   · Continue Levaquin IV   · Urology following

## 2022-10-15 NOTE — PROGRESS NOTES
[facility]  Progress Note Hardeep Millard 1955, 69 y o  male MRN: [id number]  Unit/Bed#: -01 Encounter: [id number]  Primary Care Provider: Richmond Salcedo DO   Date and time admitted to hospital: 10/14/2022  8:56 AM    * Severe sepsis ([address] )  Assessment & Plan  · POA, evidenced by fever, tachycardia, elevated lactic acid, and presumed complicated UTI while in OR holding area prior to TURP  Procedure was cancelled   · Prior UCx with E  Coli and K  Pneumoniae sensitive to Levaquin   · Lactic acid resolved, fever curve improving  · Follow up blood cultures   · Continue Levaquin IV   · Urology following     Complicated UTI (urinary tract infection)  Assessment & Plan  · Plan as per above    Enlarged prostate with urinary obstruction  Assessment & Plan  · Patient currently has Reyes catheter in place, which was exchanged today by Urology  · Continue Flomax and Proscar  · Continue Reyes for now   · Will need re-timing of TURP per Urology     Stage 3a chronic kidney disease [facility])  Assessment & Plan  Lab Results   Component Value Date    EGFR 46 10/14/2022    EGFR 68 09/23/2022    EGFR 70 07/21/2022    CREATININE 1 52 (H) 10/14/2022    CREATININE 1 10 09/23/2022    CREATININE 1 13 07/21/2022   · Creatinine noted at 1 52, baseline appears to be 1 1-1 3  · Monitor creatinine daily   · Avoid hypotension/nephrotoxins     Varicose veins of both lower extremities  Assessment & Plan  · Patient has history of varicose vein with bilateral trace edema  · Compression stockings    Primary hypertension  Assessment & Plan  · BP acceptable   · Norvasc with hold parameters     Hyponatremia  Assessment & Plan  · Noted at 130 on admission, probably due to infection   · Status post fluid bolus  · Check BMP now and trend daily       VTE Pharmacologic Prophylaxis: VTE Score: 6 High Risk (Score >/= 5) - Pharmacological DVT Prophylaxis Ordered: enoxaparin (Lovenox)   Sequential Compression Devices Ordered  Patient Centered Rounds: I performed bedside rounds with nursing staff today  Discussions with Specialists or Other Care Team Provider: Discussed with RN, OLGA    Education and Discussions with Family / Patient: Updated  (wife) via phone  Time Spent for Care: 30 minutes  More than 50% of total time spent on counseling and coordination of care as described above  Current Length of Stay: 1 day(s)  Current Patient Status: Inpatient   Certification Statement: The patient will continue to require additional inpatient hospital stay due to pending culture data  Discharge Plan: Anticipate discharge in 24-48 hrs to home  Code Status: Level 1 - Full Code    Subjective:   Patient reports feeling better today  Reported fevers and chills overnight  States that he had episodes of diarrhea overnight with mild stomach discomfort/nausea  Denies chest pain or shortness of breath  Objective:     Vitals:   Temp (24hrs), Av 8 °F (37 7 °C), Min:97 7 °F (36 5 °C), Max:103 7 °F (39 8 °C)    Temp:  [97 7 °F (36 5 °C)-103 7 °F (39 8 °C)] 101 °F (38 3 °C)  HR:  [] 98  Resp:  [16-20] 19  BP: (120-159)/(69-93) 120/72  SpO2:  [94 %-98 %] 95 %  Body mass index is 31 9 kg/m²  Input and Output Summary (last 24 hours): Intake/Output Summary (Last 24 hours) at 10/15/2022 0839  Last data filed at 10/15/2022 0530  Gross per 24 hour   Intake --   Output 1275 ml   Net -1275 ml       Physical Exam:   Physical Exam  Constitutional:       General: He is not in acute distress  Appearance: Normal appearance  He is normal weight  He is not ill-appearing or diaphoretic  HENT:      Head: Normocephalic and atraumatic  Mouth/Throat:      Mouth: Mucous membranes are moist    Eyes:      General: No scleral icterus  Pupils: Pupils are equal, round, and reactive to light  Cardiovascular:      Rate and Rhythm: Normal rate and regular rhythm  Pulses: Normal pulses        Heart sounds: Normal heart sounds, S1 normal and S2 normal  No murmur heard  No systolic murmur is present  No diastolic murmur is present  No gallop  No S3 or S4 sounds  Pulmonary:      Effort: Pulmonary effort is normal  No accessory muscle usage or respiratory distress  Breath sounds: Normal breath sounds  No stridor  No decreased breath sounds, wheezing, rhonchi or rales  Chest:      Chest wall: No tenderness  Abdominal:      General: Bowel sounds are normal  There is no distension  Palpations: Abdomen is soft  Tenderness: There is no abdominal tenderness  There is no guarding  Musculoskeletal:      Right lower leg: No edema  Left lower leg: No edema  Skin:     General: Skin is warm and dry  Coloration: Skin is not jaundiced  Neurological:      General: No focal deficit present  Mental Status: He is alert  Mental status is at baseline  Motor: No tremor or seizure activity  Psychiatric:         Behavior: Behavior is cooperative            Additional Data:     Labs:  Results from last 7 days   Lab Units 10/14/22  1524   WBC Thousand/uL 7 62   HEMOGLOBIN g/dL 15 2   HEMATOCRIT % 45 8   PLATELETS Thousands/uL 155   NEUTROS PCT % 91*   LYMPHS PCT % 5*   MONOS PCT % 3*   EOS PCT % 0     Results from last 7 days   Lab Units 10/14/22  1524   SODIUM mmol/L 130*   POTASSIUM mmol/L 3 7   CHLORIDE mmol/L 99   CO2 mmol/L 20*   BUN mg/dL 18   CREATININE mg/dL 1 52*   ANION GAP mmol/L 11   CALCIUM mg/dL 9 6   ALBUMIN g/dL 3 3*   TOTAL BILIRUBIN mg/dL 1 51*   ALK PHOS U/L 70   ALT U/L 21   AST U/L 19   GLUCOSE RANDOM mg/dL 164*         Results from last 7 days   Lab Units 10/14/22  0945   POC GLUCOSE mg/dl 164*         Results from last 7 days   Lab Units 10/15/22  0437 10/14/22  1524   LACTIC ACID mmol/L 1 8 3 7*   PROCALCITONIN ng/ml 3 36* 0 59*       Lines/Drains:  Invasive Devices  Report    Peripheral Intravenous Line  Duration           Peripheral IV 10/14/22 Left;Ventral (anterior) Forearm <1 day          Drain  Duration           Urethral Catheter Coude 16 Fr  62 days              Urinary Catheter:  Goal for removal: N/A- Discharging with Reyes               Imaging: No pertinent imaging reviewed  Recent Cultures (last 7 days):   Results from last 7 days   Lab Units 10/14/22  1526 10/14/22  1525   BLOOD CULTURE  Received in Microbiology Lab  Culture in Progress  Received in Microbiology Lab  Culture in Progress  Last 24 Hours Medication List:   Current Facility-Administered Medications   Medication Dose Route Frequency Provider Last Rate   • acetaminophen  650 mg Oral Q6H PRN Shaye Walter MD     • amLODIPine  10 mg Oral Daily Shaye Walter MD     • enoxaparin  40 mg Subcutaneous Daily Shaye Walter MD     • finasteride  5 mg Oral Daily Shaye Walter MD     • levofloxacin  750 mg Intravenous Q24H Shaye Walter MD     • ondansetron  4 mg Intravenous Q6H PRN Shaye Walter MD     • tamsulosin  0 4 mg Oral Daily With Jose Davison MD          Today, Patient Was Seen By: Marco Porter    **Please Note: This note may have been constructed using a voice recognition system  **

## 2022-10-15 NOTE — ASSESSMENT & PLAN NOTE
· Patient currently has Reyes catheter in place, which was exchanged today by Urology  · Continue Flomax and Proscar  · Continue Reyes for now   · Will need re-timing of TURP per Urology

## 2022-10-15 NOTE — H&P (VIEW-ONLY)
UROLOGY PROGRESS NOTE   Patient Identifiers: Vicente Thomas (MRN [id number])  Date of Service: 10/15/2022        Assessment:   69year old man with catheter dependent urinary tension  We were to perform a TURP yesterday, he became febrile with chills in the holding area  As such his case was canceled  He is feeling better today, he has been having fevers throughout the night with his last temperature this morning  I recommend ongoing IV antibiosis until cultures are available     Plan:   Continued care per the [facility] Internal Medicine team     His Reyes catheter is draining well  We will look for a date for surgery for him upcoming  Urology signing off, please re-consult as necessary      Subjective:     24 HR EVENTS:   No significant events      Patient has  no new complaints, feeling some better relative to yesterday  Still having fevers as of this morning            Objective:     VITALS:    Vitals:    10/15/22 0732   BP: 120/72   Pulse: 98   Resp: 19   Temp: (!) 101 °F (38 3 °C)   SpO2: 95%       INS & OUTS:    Reviewed pertinent values I's/O's      LABS:  Lab Results   Component Value Date    HGB 15 2 10/14/2022    HCT 45 8 10/14/2022    WBC 7 62 10/14/2022     10/14/2022   ]    Lab Results   Component Value Date    K 3 7 10/14/2022    CL 99 10/14/2022    CO2 20 (L) 10/14/2022    BUN 18 10/14/2022    CREATININE 1 52 (H) 10/14/2022    CALCIUM 9 6 10/14/2022   ]    INPATIENT MEDS:    Current Facility-Administered Medications:   •  acetaminophen (TYLENOL) tablet 650 mg, 650 mg, Oral, Q6H PRN, Avni Sneed MD, 650 mg at 10/15/22 0837  •  amLODIPine (NORVASC) tablet 10 mg, 10 mg, Oral, Daily, Hilario Lema MD, 10 mg at 10/15/22 0830  •  enoxaparin (LOVENOX) subcutaneous injection 40 mg, 40 mg, Subcutaneous, Daily, Hilario Lema MD, 40 mg at 10/15/22 0830  •  finasteride (PROSCAR) tablet 5 mg, 5 mg, Oral, Daily, Hilario Lema MD, 5 mg at 10/15/22 0830  •  levofloxacin (LEVAQUIN) IVPB (premix in dextrose) 750 mg 150 mL, 750 mg, Intravenous, Q24H, Hilario Lema MD  •  ondansetron (ZOFRAN) injection 4 mg, 4 mg, Intravenous, Q6H PRN, Avni Sneed MD, 4 mg at 10/14/22 1538  •  tamsulosin (FLOMAX) capsule 0 4 mg, 0 4 mg, Oral, Daily With Geri Gold MD, 0 4 mg at 10/14/22 1540      Physical Exam:   /72   Pulse 98   Temp (!) 101 °F (38 3 °C)   Resp 19   Ht 5' 9" (1 753 m)   Wt 98 kg (216 lb)   SpO2 95%   BMI 31 90 kg/m²   GEN: alert and oriented x 3    RESP: breathing comfortably with no accessory muscle use    CARDIAC: peripheral pulses present    ABD: Normal   INCISION: None   EXT: bilateral peripheral edema , bilateral varices  DRAINS:  None  NEURO: cranial nerves 2-12 intact, no sensory deficits and no motor deficits   PSYCHIATRIC: normal mood and affect and normal train of thought    GENITOURINARY:uncircumcised            FELDER: in place draining clear yellow urine        RADIOLOGY:   No new films for my review

## 2022-10-15 NOTE — ASSESSMENT & PLAN NOTE
· Noted at 130 on admission, probably due to infection   · Status post fluid bolus  · Check BMP now and trend daily

## 2022-10-15 NOTE — PLAN OF CARE
Problem: Potential for Falls  Goal: Patient will remain free of falls  Description: INTERVENTIONS:  - Educate patient/family on patient safety including physical limitations  - Instruct patient to call for assistance with activity   - Consult OT/PT to assist with strengthening/mobility   - Keep Call bell within reach  - Keep bed low and locked with side rails adjusted as appropriate  - Keep care items and personal belongings within reach  - Initiate and maintain comfort rounds  - Make Fall Risk Sign visible to staff  - Offer Toileting every  Hours, in advance of need  - Initiate/Maintain alarm  - Obtain necessary fall risk management equipment:   - Apply yellow socks and bracelet for high fall risk patients  - Consider moving patient to room near nurses station  Outcome: Progressing     Problem: GENITOURINARY - ADULT  Goal: Maintains or returns to baseline urinary function  Description: INTERVENTIONS:  - Assess urinary function  - Encourage oral fluids to ensure adequate hydration if ordered  - Administer IV fluids as ordered to ensure adequate hydration  - Administer ordered medications as needed  - Offer frequent toileting  - Follow urinary retention protocol if ordered  Outcome: Progressing  Goal: Absence of urinary retention  Description: INTERVENTIONS:  - Assess patient’s ability to void and empty bladder  - Monitor I/O  - Bladder scan as needed  - Discuss with physician/AP medications to alleviate retention as needed  - Discuss catheterization for long term situations as appropriate  Outcome: Progressing  Goal: Urinary catheter remains patent  Description: INTERVENTIONS:  - Assess patency of urinary catheter  - If patient has a chronic nathan, consider changing catheter if non-functioning  - Follow guidelines for intermittent irrigation of non-functioning urinary catheter  Outcome: Progressing     Problem: METABOLIC, FLUID AND ELECTROLYTES - ADULT  Goal: Electrolytes maintained within normal limits  Description: INTERVENTIONS:  - Monitor labs and assess patient for signs and symptoms of electrolyte imbalances  - Administer electrolyte replacement as ordered  - Monitor response to electrolyte replacements, including repeat lab results as appropriate  - Instruct patient on fluid and nutrition as appropriate  Outcome: Progressing  Goal: Fluid balance maintained  Description: INTERVENTIONS:  - Monitor labs   - Monitor I/O and WT  - Instruct patient on fluid and nutrition as appropriate  - Assess for signs & symptoms of volume excess or deficit  Outcome: Progressing     Problem: HEMATOLOGIC - ADULT  Goal: Maintains hematologic stability  Description: INTERVENTIONS  - Assess for signs and symptoms of bleeding or hemorrhage  - Monitor labs  - Administer supportive blood products/factors as ordered and appropriate  Outcome: Progressing     Problem: PAIN - ADULT  Goal: Verbalizes/displays adequate comfort level or baseline comfort level  Description: Interventions:  - Encourage patient to monitor pain and request assistance  - Assess pain using appropriate pain scale  - Administer analgesics based on type and severity of pain and evaluate response  - Implement non-pharmacological measures as appropriate and evaluate response  - Consider cultural and social influences on pain and pain management  - Notify physician/advanced practitioner if interventions unsuccessful or patient reports new pain  Outcome: Progressing     Problem: INFECTION - ADULT  Goal: Absence or prevention of progression during hospitalization  Description: INTERVENTIONS:  - Assess and monitor for signs and symptoms of infection  - Monitor lab/diagnostic results  - Monitor all insertion sites, i e  indwelling lines, tubes, and drains  - Monitor endotracheal if appropriate and nasal secretions for changes in amount and color  - [place] appropriate cooling/warming therapies per order  - Administer medications as ordered  - Instruct and encourage patient and family to use good hand hygiene technique  - Identify and instruct in appropriate isolation precautions for identified infection/condition  Outcome: Progressing     Problem: Prexisting or High Potential for Compromised Skin Integrity  Goal: Skin integrity is maintained or improved  Description: INTERVENTIONS:  - Identify patients at risk for skin breakdown  - Assess and monitor skin integrity  - Assess and monitor nutrition and hydration status  - Monitor labs   - Assess for incontinence   - Turn and reposition patient  - Assist with mobility/ambulation  - Relieve pressure over bony prominences  - Avoid friction and shearing  - Provide appropriate hygiene as needed including keeping skin clean and dry  - Evaluate need for skin moisturizer/barrier cream  - Collaborate with interdisciplinary team   - Patient/family teaching  - Consider wound care consult   Outcome: Progressing

## 2022-10-15 NOTE — ASSESSMENT & PLAN NOTE
Lab Results   Component Value Date    EGFR 46 10/14/2022    EGFR 68 09/23/2022    EGFR 70 07/21/2022    CREATININE 1 52 (H) 10/14/2022    CREATININE 1 10 09/23/2022    CREATININE 1 13 07/21/2022   · Creatinine noted at 1 52, baseline appears to be 1 1-1 3  · Monitor creatinine daily   · Avoid hypotension/nephrotoxins

## 2022-10-15 NOTE — PROGRESS NOTES
UROLOGY PROGRESS NOTE   Patient Identifiers: Lisa Tyson (MRN [id number])  Date of Service: 10/15/2022        Assessment:   69year old man with catheter dependent urinary tension  We were to perform a TURP yesterday, he became febrile with chills in the holding area  As such his case was canceled  He is feeling better today, he has been having fevers throughout the night with his last temperature this morning  I recommend ongoing IV antibiosis until cultures are available     Plan:   Continued care per the [facility] Internal Medicine team     His Reyes catheter is draining well  We will look for a date for surgery for him upcoming  Urology signing off, please re-consult as necessary      Subjective:     24 HR EVENTS:   No significant events      Patient has  no new complaints, feeling some better relative to yesterday  Still having fevers as of this morning            Objective:     VITALS:    Vitals:    10/15/22 0732   BP: 120/72   Pulse: 98   Resp: 19   Temp: (!) 101 °F (38 3 °C)   SpO2: 95%       INS & OUTS:    Reviewed pertinent values I's/O's      LABS:  Lab Results   Component Value Date    HGB 15 2 10/14/2022    HCT 45 8 10/14/2022    WBC 7 62 10/14/2022     10/14/2022   ]    Lab Results   Component Value Date    K 3 7 10/14/2022    CL 99 10/14/2022    CO2 20 (L) 10/14/2022    BUN 18 10/14/2022    CREATININE 1 52 (H) 10/14/2022    CALCIUM 9 6 10/14/2022   ]    INPATIENT MEDS:    Current Facility-Administered Medications:   •  acetaminophen (TYLENOL) tablet 650 mg, 650 mg, Oral, Q6H PRN, Kala Meigs, MD, 650 mg at 10/15/22 0837  •  amLODIPine (NORVASC) tablet 10 mg, 10 mg, Oral, Daily, Hilario Lema MD, 10 mg at 10/15/22 0830  •  enoxaparin (LOVENOX) subcutaneous injection 40 mg, 40 mg, Subcutaneous, Daily, Hilario Lema MD, 40 mg at 10/15/22 0830  •  finasteride (PROSCAR) tablet 5 mg, 5 mg, Oral, Daily, Hilario Lema MD, 5 mg at 10/15/22 0830  •  levofloxacin (LEVAQUIN) IVPB (premix in dextrose) 750 mg 150 mL, 750 mg, Intravenous, Q24H, Hilario Lema MD  •  ondansetron (ZOFRAN) injection 4 mg, 4 mg, Intravenous, Q6H PRN, Jhonny Poe MD, 4 mg at 10/14/22 1538  •  tamsulosin (FLOMAX) capsule 0 4 mg, 0 4 mg, Oral, Daily With Andressa Perry MD, 0 4 mg at 10/14/22 1540      Physical Exam:   /72   Pulse 98   Temp (!) 101 °F (38 3 °C)   Resp 19   Ht 5' 9" (1 753 m)   Wt 98 kg (216 lb)   SpO2 95%   BMI 31 90 kg/m²   GEN: alert and oriented x 3    RESP: breathing comfortably with no accessory muscle use    CARDIAC: peripheral pulses present    ABD: Normal   INCISION: None   EXT: bilateral peripheral edema , bilateral varices  DRAINS:  None  NEURO: cranial nerves 2-12 intact, no sensory deficits and no motor deficits   PSYCHIATRIC: normal mood and affect and normal train of thought    GENITOURINARY:uncircumcised            FELDER: in place draining clear yellow urine        RADIOLOGY:   No new films for my review

## 2022-10-16 PROBLEM — R19.7 DIARRHEA: Status: ACTIVE | Noted: 2022-10-16

## 2022-10-16 LAB
ANION GAP SERPL CALCULATED.3IONS-SCNC: 7 MMOL/L (ref 4–13)
BUN SERPL-MCNC: 13 MG/DL (ref 5–25)
C DIFF TOX GENS STL QL NAA+PROBE: NEGATIVE
CALCIUM SERPL-MCNC: 8.6 MG/DL (ref 8.3–10.1)
CHLORIDE SERPL-SCNC: 101 MMOL/L (ref 96–108)
CO2 SERPL-SCNC: 24 MMOL/L (ref 21–32)
CREAT SERPL-MCNC: 0.99 MG/DL (ref 0.6–1.3)
ERYTHROCYTE [DISTWIDTH] IN BLOOD BY AUTOMATED COUNT: 15.1 % (ref 11.6–15.1)
GFR SERPL CREATININE-BSD FRML MDRD: 77 ML/MIN/1.73SQ M
GLUCOSE SERPL-MCNC: 135 MG/DL (ref 65–140)
HCT VFR BLD AUTO: 41 % (ref 36.5–49.3)
HGB BLD-MCNC: 13.4 G/DL (ref 12–17)
MCH RBC QN AUTO: 27.8 PG (ref 26.8–34.3)
MCHC RBC AUTO-ENTMCNC: 32.7 G/DL (ref 31.4–37.4)
MCV RBC AUTO: 85 FL (ref 82–98)
PLATELET # BLD AUTO: 143 THOUSANDS/UL (ref 149–390)
PMV BLD AUTO: 10.2 FL (ref 8.9–12.7)
POTASSIUM SERPL-SCNC: 3.4 MMOL/L (ref 3.5–5.3)
RBC # BLD AUTO: 4.82 MILLION/UL (ref 3.88–5.62)
SODIUM SERPL-SCNC: 132 MMOL/L (ref 135–147)
WBC # BLD AUTO: 4.67 THOUSAND/UL (ref 4.31–10.16)

## 2022-10-16 PROCEDURE — 80048 BASIC METABOLIC PNL TOTAL CA: CPT | Performed by: PHYSICIAN ASSISTANT

## 2022-10-16 PROCEDURE — 85027 COMPLETE CBC AUTOMATED: CPT | Performed by: PHYSICIAN ASSISTANT

## 2022-10-16 PROCEDURE — 99232 SBSQ HOSP IP/OBS MODERATE 35: CPT | Performed by: PHYSICIAN ASSISTANT

## 2022-10-16 RX ORDER — LOPERAMIDE HYDROCHLORIDE 2 MG/1
2 CAPSULE ORAL 4 TIMES DAILY PRN
Status: DISCONTINUED | OUTPATIENT
Start: 2022-10-16 | End: 2022-10-17 | Stop reason: HOSPADM

## 2022-10-16 RX ORDER — POTASSIUM CHLORIDE 20 MEQ/1
40 TABLET, EXTENDED RELEASE ORAL ONCE
Status: COMPLETED | OUTPATIENT
Start: 2022-10-16 | End: 2022-10-16

## 2022-10-16 RX ADMIN — AMLODIPINE BESYLATE 10 MG: 10 TABLET ORAL at 08:18

## 2022-10-16 RX ADMIN — TAMSULOSIN HYDROCHLORIDE 0.4 MG: 0.4 CAPSULE ORAL at 15:37

## 2022-10-16 RX ADMIN — POTASSIUM CHLORIDE 40 MEQ: 1500 TABLET, EXTENDED RELEASE ORAL at 08:51

## 2022-10-16 RX ADMIN — FINASTERIDE 5 MG: 5 TABLET, FILM COATED ORAL at 08:18

## 2022-10-16 RX ADMIN — ENOXAPARIN SODIUM 40 MG: 40 INJECTION SUBCUTANEOUS at 08:18

## 2022-10-16 RX ADMIN — LEVOFLOXACIN 750 MG: 750 INJECTION, SOLUTION INTRAVENOUS at 11:06

## 2022-10-16 NOTE — PROGRESS NOTES
[facility]  Progress Note Tyler López 1955, 69 y o  male MRN: [id number]  Unit/Bed#: -01 Encounter: [id number]  Primary Care Provider: Sarah Stephen DO   Date and time admitted to hospital: 10/14/2022  8:56 AM    * Severe sepsis ([address] )  Assessment & Plan  · POA, evidenced by fever, tachycardia, elevated lactic acid, and presumed complicated UTI while in OR holding area prior to TURP  Procedure was cancelled   · Prior UCx with E  Coli and K   Pneumoniae sensitive to Levaquin   · Lactic acid resolved, fever curve improving  · Blood cultures negative at 24 hours   · Follow up blood cultures   · Continue Levaquin IV   · Urology input appreciated, will need to reschedule TURP    Complicated UTI (urinary tract infection)  Assessment & Plan  · Plan as per above    Enlarged prostate with urinary obstruction  Assessment & Plan  · Patient currently has Reyes catheter in place, which was exchanged today by Urology  · Continue Flomax and Proscar  · Continue Reyes for now   · Will need re-timing of TURP per Urology     Stage 3a chronic kidney disease [facility])  Assessment & Plan  Lab Results   Component Value Date    EGFR 77 10/16/2022    EGFR 46 10/14/2022    EGFR 68 09/23/2022    CREATININE 0 99 10/16/2022    CREATININE 1 52 (H) 10/14/2022    CREATININE 1 10 09/23/2022   · Creatinine noted at 1 52, baseline appears to be 1 1-1 3  · Today is actually below baseline   · Monitor creatinine daily   · Avoid hypotension/nephrotoxins     Varicose veins of both lower extremities  Assessment & Plan  · Patient has history of varicose vein with bilateral trace edema  · Compression stockings    Primary hypertension  Assessment & Plan  · BP acceptable   · Norvasc with hold parameters     Hyponatremia  Assessment & Plan  · Noted at 130 on admission, probably due to infection   · Status post fluid bolus  · Improved slightly to 132  · Patient eating and drinking well, suspect will continue to improve with good solute intake   · Monitoring BMP    Diarrhea  Assessment & Plan  · C  Diff sent yesterday  Stool output improving   · Follow up C  Diff PCR         VTE Pharmacologic Prophylaxis: VTE Score: 6 High Risk (Score >/= 5) - Pharmacological DVT Prophylaxis Ordered: heparin  Sequential Compression Devices Ordered  Patient Centered Rounds: I performed bedside rounds with nursing staff today  Discussions with Specialists or Other Care Team Provider: Discussed with RN    Education and Discussions with Family / Patient: Updated  (wife) via phone  Time Spent for Care: 30 minutes  More than 50% of total time spent on counseling and coordination of care as described above  Current Length of Stay: 2 day(s)  Current Patient Status: Inpatient   Certification Statement: The patient will continue to require additional inpatient hospital stay due to further IV antibiotics, monitoring fever curve   Discharge Plan: Anticipate discharge tomorrow to home  Code Status: Level 1 - Full Code    Subjective:   Patient reports feeling overall improved  Reports eating and drinking well  Reports improved diarrhea  Denies fevers or chills  Objective:     Vitals:   Temp (24hrs), Av 6 °F (37 °C), Min:97 7 °F (36 5 °C), Max:100 °F (37 8 °C)    Temp:  [97 7 °F (36 5 °C)-100 °F (37 8 °C)] 98 °F (36 7 °C)  HR:  [] 85  Resp:  [16] 16  BP: (114-129)/(73-83) 129/83  SpO2:  [95 %-98 %] 98 %  Body mass index is 30 79 kg/m²  Input and Output Summary (last 24 hours): Intake/Output Summary (Last 24 hours) at 10/16/2022 0844  Last data filed at 10/16/2022 0600  Gross per 24 hour   Intake 1840 ml   Output 2100 ml   Net -260 ml       Physical Exam:   Physical Exam  Constitutional:       General: He is not in acute distress  Appearance: Normal appearance  He is normal weight  He is not ill-appearing or diaphoretic  HENT:      Head: Normocephalic and atraumatic        Mouth/Throat:      Mouth: Mucous membranes are moist    Eyes:      General: No scleral icterus  Pupils: Pupils are equal, round, and reactive to light  Cardiovascular:      Rate and Rhythm: Normal rate and regular rhythm  Pulses: Normal pulses  Heart sounds: Normal heart sounds, S1 normal and S2 normal  No murmur heard  No systolic murmur is present  No diastolic murmur is present  No gallop  No S3 or S4 sounds  Pulmonary:      Effort: Pulmonary effort is normal  No accessory muscle usage or respiratory distress  Breath sounds: Normal breath sounds  No stridor  No decreased breath sounds, wheezing, rhonchi or rales  Chest:      Chest wall: No tenderness  Abdominal:      General: Bowel sounds are normal  There is no distension  Palpations: Abdomen is soft  Tenderness: There is no abdominal tenderness  There is no guarding  Genitourinary:     Comments: Reyes with clear yellow urine draining   Musculoskeletal:      Right lower leg: No edema  Left lower leg: No edema  Skin:     General: Skin is warm and dry  Coloration: Skin is not jaundiced  Neurological:      General: No focal deficit present  Mental Status: He is alert  Mental status is at baseline  Motor: No tremor or seizure activity  Psychiatric:         Behavior: Behavior is cooperative            Additional Data:     Labs:  Results from last 7 days   Lab Units 10/16/22  0618 10/14/22  1524   WBC Thousand/uL 4 67 7 62   HEMOGLOBIN g/dL 13 4 15 2   HEMATOCRIT % 41 0 45 8   PLATELETS Thousands/uL 143* 155   NEUTROS PCT %  --  91*   LYMPHS PCT %  --  5*   MONOS PCT %  --  3*   EOS PCT %  --  0     Results from last 7 days   Lab Units 10/16/22  0618 10/14/22  1524   SODIUM mmol/L 132* 130*   POTASSIUM mmol/L 3 4* 3 7   CHLORIDE mmol/L 101 99   CO2 mmol/L 24 20*   BUN mg/dL 13 18   CREATININE mg/dL 0 99 1 52*   ANION GAP mmol/L 7 11   CALCIUM mg/dL 8 6 9 6   ALBUMIN g/dL  --  3 3*   TOTAL BILIRUBIN mg/dL  --  1 51*   ALK PHOS U/L  --  70   ALT U/L  --  21   AST U/L  --  19   GLUCOSE RANDOM mg/dL 135 164*         Results from last 7 days   Lab Units 10/14/22  0945   POC GLUCOSE mg/dl 164*         Results from last 7 days   Lab Units 10/15/22  0437 10/14/22  1524   LACTIC ACID mmol/L 1 8 3 7*   PROCALCITONIN ng/ml 3 36* 0 59*       Lines/Drains:  Invasive Devices  Report    Peripheral Intravenous Line  Duration           Peripheral IV 10/15/22 Left;Proximal;Ventral (anterior) Forearm <1 day          Drain  Duration           Urethral Catheter Coude 16 Fr  63 days              Urinary Catheter:  Goal for removal: N/A- Discharging with Reyes               Imaging: No pertinent imaging reviewed  Recent Cultures (last 7 days):   Results from last 7 days   Lab Units 10/14/22  1526 10/14/22  1525   BLOOD CULTURE  No Growth at 24 hrs  No Growth at 24 hrs  Last 24 Hours Medication List:   Current Facility-Administered Medications   Medication Dose Route Frequency Provider Last Rate   • acetaminophen  650 mg Oral Q6H PRN Sherwin Ibarra MD     • amLODIPine  10 mg Oral Daily Sherwin Ibarra MD     • enoxaparin  40 mg Subcutaneous Daily Sherwin Ibarra MD     • finasteride  5 mg Oral Daily Sherwin Ibarra MD     • levofloxacin  750 mg Intravenous Q24H Sherwin Ibarra  mg (10/15/22 1154)   • ondansetron  4 mg Intravenous Q6H PRN Sherwin Ibarra MD     • potassium chloride  40 mEq Oral Once Ammon Thompson PA-C     • tamsulosin  0 4 mg Oral Daily With Alda Power MD          Today, Patient Was Seen By: Stephanie Seth    **Please Note: This note may have been constructed using a voice recognition system  **

## 2022-10-16 NOTE — ASSESSMENT & PLAN NOTE
Lab Results   Component Value Date    EGFR 77 10/16/2022    EGFR 46 10/14/2022    EGFR 68 09/23/2022    CREATININE 0 99 10/16/2022    CREATININE 1 52 (H) 10/14/2022    CREATININE 1 10 09/23/2022   · Creatinine noted at 1 52, baseline appears to be 1 1-1 3  · Today is actually below baseline   · Monitor creatinine daily   · Avoid hypotension/nephrotoxins

## 2022-10-16 NOTE — ASSESSMENT & PLAN NOTE
· Noted at 130 on admission, probably due to infection   · Status post fluid bolus  · Improved slightly to 132  · Patient eating and drinking well, suspect will continue to improve with good solute intake   · Monitoring BMP

## 2022-10-16 NOTE — CASE MANAGEMENT
Case Management Assessment & Discharge Planning Note    Patient name Marianna Blanchard  Location [address] [id number] MRN [id number]  : 1955 Date 10/16/2022       Current Admission Date: 10/14/2022  Current Admission Diagnosis:Severe sepsis [facility])   Patient Active Problem List    Diagnosis Date Noted   • Diarrhea    • Complicated UTI (urinary tract infection) 10/14/2022   • Hyponatremia 10/14/2022   • Severe sepsis ([facility] ) 10/14/2022   • Rebuck cardiac risk 10-20% in next 10 years 2022   • Abnormal glucose 2022   • Stage 3a chronic kidney disease ([facility] [address] [facility] ) 2022   • Primary hypertension 2022   • Constipation 2022   • Hydroureteronephrosis 2022   • Urinary (tract) obstruction 2022   • Elevated serum creatinine 2022   • Enlarged prostate with urinary obstruction 2022   • Varicose veins of both lower extremities 2022   • BMI 33 0-33 9,adult 2021   • Obesity (BMI 30-39 9) 2021   • Immunization due 2021   • Allergic rhinitis 2013      LOS (days): 2  Geometric Mean LOS (GMLOS) (days): 2 80  Days to GMLOS:0 9     OBJECTIVE:    Risk of Unplanned Readmission Score: 7 93         Current admission status: Inpatient       Preferred Pharmacy:   [place] [address]- At [facility]  Phone: [phone] Fax: [facility] [place] [address] [facility]  Phone: [phone] Fax: [phone]    Primary Care Provider: Nyasia Cameron DO    Primary Insurance: MEDICARE  Secondary Insurance: AARP    ASSESSMENT:  [facility] Representative - Spouse   Primary Phone: [phone] (Mobile)               Patient Information  Admitted from[de-identified] Home  Mental Status: Alert  During Assessment patient was accompanied by: Not accompanied during assessment  Assessment information provided by[de-identified] Patient  Primary Caregiver: Self  Support Systems: Spouse/significant other, Family members  What city do you live in?: [place]  Type of Current Residence: 2 story home  Upon entering residence, is there a bedroom on the main floor (no further steps)?: No  A bedroom is located on the following floor levels of residence (select all that apply):: 2nd Floor  Upon entering residence, is there a bathroom on the main floor (no further steps)?: Yes  Number of steps to 2nd floor from main floor: One Flight  In the last 12 months, was there a time when you were not able to pay the mortgage or rent on time?: No  In the last 12 months, how many places have you lived?: 1  In the last 12 months, was there a time when you did not have a steady place to sleep or slept in a shelter (including now)?: No  Homeless/housing insecurity resource given?: N/A  Living Arrangements: Lives w/ Spouse/significant other    Activities of Daily Living Prior to Admission  Functional Status: Independent  Completes ADLs independently?: Yes  Ambulates independently?: Yes  Does patient use assisted devices?: No  Does patient currently own DME?: No  Does patient have a history of Outpatient Therapy (PT/OT)?: No  Does the patient have a history of Short-Term Rehab?: No  Does patient have a history of HHC?: Yes  Does patient currently have [facility]?: Yes ([facility])    [address] [facility]  Type of Current Home Care Services: Nurse visit  [address] [place] [address][de-identified] Other (please enter name in comment) ([facility])  [address] Provider[de-identified] PCP    Patient Information Continued  Income Source: Pension/FPC  Does patient have prescription coverage?: Yes  Within the past 12 months, you worried that your food would run out before you got the money to buy more : Never true  Within the past 12 months, the food you bought just didn't last and you didn't have money to get more  [place] Never true  Food insecurity resource given?: N/A  Does patient receive dialysis treatments?: No         Means of Transportation  Means of Transport to Appts[de-identified] Family transport  In the past 12 months, has lack of transportation kept you from medical appointments or from getting medications?: No  In the past 12 months, has lack of transportation kept you from meetings, work, or from getting things needed for daily living?: No  Was application for public transport provided?: N/A          Discharge planning discussed with[de-identified] patient  Freedom of Choice: Yes  Comments - Freedom of Choice: Choice offered in the interest of dc planning - pt has an open service w [facility], would like to resume care    CM contacted family/caregiver?: No- see comments (pt makes own decisions)  Were Treatment Team discharge recommendations reviewed with patient/caregiver?: Yes  Did patient/caregiver verbalize understanding of patient care needs?: Yes  Were patient/caregiver advised of the risks associated with not following Treatment Team discharge recommendations?: Yes    Contacts  Patient Contacts: Maddie-spouse  Relationship to Patient[de-identified] Family  Contact Method: Phone  Phone Number: [phone]  Reason/Outcome: Continuity of Care, Emergency Contact, Discharge [address]         Is the patient interested in [facility]?: Yes  [address] requested[de-identified] [address] Name[de-identified] Other ([facility])  [facility] Eugenia Browning Provider[de-identified] PCP  Home Health Services Needed[de-identified] Evaluate Functional Status and Safety, Gait/ADL Training, Strengthening/Theraputic Exercises to Improve Function  Homebound Criteria Met[de-identified] Uses an Assist Device (i e  cane, walker, etc), Requires the Assistance of Another Person for Safe Ambulation or to Leave the Home  Supporting Clincal Findings[de-identified] Fatigues Easliy in [employer], Limited Endurance    DME Referral Provided  Referral made for DME?: No    Other Referral/Resources/Interventions Provided:  Referral Comments: Resumption of care referral to be sent to [facility]           Treatment Team Recommendation: Home with [address] [place] [address]  Discharge Destination Plan[de-identified] Home with [place] at Discharge : Family

## 2022-10-16 NOTE — ASSESSMENT & PLAN NOTE
· POA, evidenced by fever, tachycardia, elevated lactic acid, and presumed complicated UTI while in OR holding area prior to TURP  Procedure was cancelled   · Prior UCx with E  Coli and K   Pneumoniae sensitive to Levaquin   · Lactic acid resolved, fever curve improving  · Blood cultures negative at 24 hours   · Follow up blood cultures   · Continue Levaquin IV   · Urology input appreciated, will need to reschedule TURP

## 2022-10-16 NOTE — PLAN OF CARE
Problem: Potential for Falls  Goal: Patient will remain free of falls  Description: INTERVENTIONS:  - Educate patient/family on patient safety including physical limitations  - Instruct patient to call for assistance with activity   - Consult OT/PT to assist with strengthening/mobility   - Keep Call bell within reach  - Keep bed low and locked with side rails adjusted as appropriate  - Keep care items and personal belongings within reach  - Initiate and maintain comfort rounds  - Make Fall Risk Sign visible to staff  - Offer Toileting every Hours, in advance of need  - Initiate/Maintain alarm  - Obtain necessary fall risk management equipment:   - Apply yellow socks and bracelet for high fall risk patients  - Consider moving patient to room near nurses station  Outcome: Progressing     Problem: GENITOURINARY - ADULT  Goal: Maintains or returns to baseline urinary function  Description: INTERVENTIONS:  - Assess urinary function  - Encourage oral fluids to ensure adequate hydration if ordered  - Administer IV fluids as ordered to ensure adequate hydration  - Administer ordered medications as needed  - Offer frequent toileting  - Follow urinary retention protocol if ordered  Outcome: Progressing  Goal: Absence of urinary retention  Description: INTERVENTIONS:  - Assess patient’s ability to void and empty bladder  - Monitor I/O  - Bladder scan as needed  - Discuss with physician/AP medications to alleviate retention as needed  - Discuss catheterization for long term situations as appropriate  Outcome: Progressing  Goal: Urinary catheter remains patent  Description: INTERVENTIONS:  - Assess patency of urinary catheter  - If patient has a chronic nathan, consider changing catheter if non-functioning  - Follow guidelines for intermittent irrigation of non-functioning urinary catheter  Outcome: Progressing     Problem: METABOLIC, FLUID AND ELECTROLYTES - ADULT  Goal: Electrolytes maintained within normal limits  Description: INTERVENTIONS:  - Monitor labs and assess patient for signs and symptoms of electrolyte imbalances  - Administer electrolyte replacement as ordered  - Monitor response to electrolyte replacements, including repeat lab results as appropriate  - Instruct patient on fluid and nutrition as appropriate  Outcome: Progressing  Goal: Fluid balance maintained  Description: INTERVENTIONS:  - Monitor labs   - Monitor I/O and WT  - Instruct patient on fluid and nutrition as appropriate  - Assess for signs & symptoms of volume excess or deficit  Outcome: Progressing     Problem: HEMATOLOGIC - ADULT  Goal: Maintains hematologic stability  Description: INTERVENTIONS  - Assess for signs and symptoms of bleeding or hemorrhage  - Monitor labs  - Administer supportive blood products/factors as ordered and appropriate  Outcome: Progressing     Problem: PAIN - ADULT  Goal: Verbalizes/displays adequate comfort level or baseline comfort level  Description: Interventions:  - Encourage patient to monitor pain and request assistance  - Assess pain using appropriate pain scale  - Administer analgesics based on type and severity of pain and evaluate response  - Implement non-pharmacological measures as appropriate and evaluate response  - Consider cultural and social influences on pain and pain management  - Notify physician/advanced practitioner if interventions unsuccessful or patient reports new pain  Outcome: Progressing     Problem: INFECTION - ADULT  Goal: Absence or prevention of progression during hospitalization  Description: INTERVENTIONS:  - Assess and monitor for signs and symptoms of infection  - Monitor lab/diagnostic results  - Monitor all insertion sites, i e  indwelling lines, tubes, and drains  - Monitor endotracheal if appropriate and nasal secretions for changes in amount and color  - [place] appropriate cooling/warming therapies per order  - Administer medications as ordered  - Instruct and encourage patient and family to use good hand hygiene technique  - Identify and instruct in appropriate isolation precautions for identified infection/condition  Outcome: Progressing     Problem: Prexisting or High Potential for Compromised Skin Integrity  Goal: Skin integrity is maintained or improved  Description: INTERVENTIONS:  - Identify patients at risk for skin breakdown  - Assess and monitor skin integrity  - Assess and monitor nutrition and hydration status  - Monitor labs   - Assess for incontinence   - Turn and reposition patient  - Assist with mobility/ambulation  - Relieve pressure over bony prominences  - Avoid friction and shearing  - Provide appropriate hygiene as needed including keeping skin clean and dry  - Evaluate need for skin moisturizer/barrier cream  - Collaborate with interdisciplinary team   - Patient/family teaching  - Consider wound care consult   Outcome: Progressing

## 2022-10-17 VITALS
TEMPERATURE: 96.2 F | HEART RATE: 87 BPM | OXYGEN SATURATION: 96 % | WEIGHT: 206.6 LBS | DIASTOLIC BLOOD PRESSURE: 72 MMHG | BODY MASS INDEX: 30.6 KG/M2 | SYSTOLIC BLOOD PRESSURE: 114 MMHG | RESPIRATION RATE: 16 BRPM | HEIGHT: 69 IN

## 2022-10-17 LAB
ANION GAP SERPL CALCULATED.3IONS-SCNC: 4 MMOL/L (ref 4–13)
BUN SERPL-MCNC: 12 MG/DL (ref 5–25)
CALCIUM SERPL-MCNC: 9 MG/DL (ref 8.3–10.1)
CHLORIDE SERPL-SCNC: 105 MMOL/L (ref 96–108)
CO2 SERPL-SCNC: 25 MMOL/L (ref 21–32)
CREAT SERPL-MCNC: 0.88 MG/DL (ref 0.6–1.3)
ERYTHROCYTE [DISTWIDTH] IN BLOOD BY AUTOMATED COUNT: 15 % (ref 11.6–15.1)
GFR SERPL CREATININE-BSD FRML MDRD: 87 ML/MIN/1.73SQ M
GLUCOSE SERPL-MCNC: 129 MG/DL (ref 65–140)
HCT VFR BLD AUTO: 39.6 % (ref 36.5–49.3)
HGB BLD-MCNC: 13 G/DL (ref 12–17)
MCH RBC QN AUTO: 27.5 PG (ref 26.8–34.3)
MCHC RBC AUTO-ENTMCNC: 32.8 G/DL (ref 31.4–37.4)
MCV RBC AUTO: 84 FL (ref 82–98)
PLATELET # BLD AUTO: 175 THOUSANDS/UL (ref 149–390)
PMV BLD AUTO: 10.3 FL (ref 8.9–12.7)
POTASSIUM SERPL-SCNC: 3.8 MMOL/L (ref 3.5–5.3)
RBC # BLD AUTO: 4.73 MILLION/UL (ref 3.88–5.62)
SODIUM SERPL-SCNC: 134 MMOL/L (ref 135–147)
WBC # BLD AUTO: 6.16 THOUSAND/UL (ref 4.31–10.16)

## 2022-10-17 PROCEDURE — 99239 HOSP IP/OBS DSCHRG MGMT >30: CPT | Performed by: NURSE PRACTITIONER

## 2022-10-17 PROCEDURE — 85027 COMPLETE CBC AUTOMATED: CPT | Performed by: PHYSICIAN ASSISTANT

## 2022-10-17 PROCEDURE — 80048 BASIC METABOLIC PNL TOTAL CA: CPT | Performed by: PHYSICIAN ASSISTANT

## 2022-10-17 RX ORDER — LEVOFLOXACIN 750 MG/1
750 TABLET ORAL EVERY 24 HOURS
Qty: 7 TABLET | Refills: 0 | Status: SHIPPED | OUTPATIENT
Start: 2022-10-18 | End: 2022-10-25

## 2022-10-17 RX ADMIN — AMLODIPINE BESYLATE 10 MG: 10 TABLET ORAL at 08:07

## 2022-10-17 RX ADMIN — LEVOFLOXACIN 750 MG: 750 INJECTION, SOLUTION INTRAVENOUS at 11:16

## 2022-10-17 RX ADMIN — FINASTERIDE 5 MG: 5 TABLET, FILM COATED ORAL at 08:07

## 2022-10-17 RX ADMIN — ENOXAPARIN SODIUM 40 MG: 40 INJECTION SUBCUTANEOUS at 08:07

## 2022-10-17 NOTE — TELEPHONE ENCOUNTER
Called and advised patient that we would like to have him get urine testing completed in preparation of surgery reschedule  Patient verbalized understanding  On review of today's d/c, patient prescribed 7 days of levaquin starting tomorrow  Advised him to get urine testing done 24 hours after completion of abx  Patient verbalized understanding       Will post pone task til next week to ensure patient gets testing done

## 2022-10-17 NOTE — TELEPHONE ENCOUNTER
Post op appt on 11/4 canceled   Will monitor for d/c and advise patient of UC needed within the next week

## 2022-10-17 NOTE — DISCHARGE SUMMARY
Discharge Summary - [address] [facility]    Patient Information: Kenny Baeza 69 y o  male MRN: [id number]  Unit/Bed#: -01 Encounter: [id number]    Discharging Physician / Practitioner: Shae Peters  PCP: Federico Gentile DO  Admission Date: 10/14/2022  Discharge Date: 10/17/22    Reason for Admission: elective TURP, however cancelled 2/2 UTI    Discharge Diagnoses:     Principal Problem:    Severe sepsis ([facility] )  Active Problems:    Enlarged prostate with urinary obstruction    Varicose veins of both lower extremities    Primary hypertension    Stage 3a chronic kidney disease ([facility] )    Complicated UTI (urinary tract infection)    Hyponatremia    Diarrhea  Resolved Problems:    * No resolved hospital problems  *      Consultations During Hospital Stay:  · Urology    Procedures Performed:     · none    Significant Findings / Test Results:     XR chest portable   Final Result by Kristine Steiner MD (10/15 9260)      Bilateral lower lobe opacities which can represent atelectasis or infiltrates  The study was marked in [facility] for immediate notification  Workstation performed: [id number]         · BC negative at 48 hours  · C diff negative      Incidental Findings:   · none     Test Results Pending at Discharge (will require follow up): · End points blood cultures     Outpatient Tests Requested: Outpatient follow-up with PCP  Outpatient follow-up with Urology, plan to reschedule TURP for 2-3 weeks time    Complications:  None    Hospital Course:     Kenny Baeza is a 69 y o  male patient with past medical history of enlarged prostate/BPH, hypertension, CKD, hyponatremia who originally presented to the hospital on 10/14/2022 due to elective TURP however was canceled secondary to sepsis criteria in setting of fever, tachycardia, lactic acidosis in setting of presumed complicated UTI, recent urine cultures with E coli, Klebsiella pneumoniae sensitive to Levaquin    Improved on intravenous antibiotics  Plan for 10 day course  Stable for discharge from Urology standpoint, they plan to contact patient to reschedule TURP in approximately 2-3 weeks time  Plan of care discussed with wife over the phone  Condition at Discharge: stable     Discharge Day Visit / Exam:     Subjective:  Patient offers no acute complaints  Denies subjective fevers, chills  No further diarrhea  Feels well and is agreeable for discharge  Vitals: Blood Pressure: 114/72 (10/17/22 0806)  Pulse: 87 (10/17/22 0806)  Temperature: (!) 96 2 °F (35 7 °C) (10/17/22 0806)  Temp Source: Temporal (10/14/22 1158)  Respirations: 16 (10/16/22 1530)  Height: 5' 9" (175 3 cm) (10/14/22 0936)  Weight - Scale: 93 7 kg (206 lb 9 6 oz) (10/17/22 0546)  SpO2: 96 % (10/17/22 0806)  Exam:   Physical Exam  Vitals and nursing note reviewed  Constitutional:       General: He is not in acute distress  Cardiovascular:      Rate and Rhythm: Normal rate  Pulmonary:      Breath sounds: Normal breath sounds  Abdominal:      Tenderness: There is no abdominal tenderness  Genitourinary:     Comments: Reyes catheter draining clear yellow urine  Musculoskeletal:         General: No swelling  Skin:     General: Skin is warm  Neurological:      Mental Status: He is alert and oriented to person, place, and time  Mental status is at baseline  Psychiatric:         Mood and Affect: Mood normal          Discussion with Family:  Patient wife over the phone    Discharge instructions/Information to patient and family:   See after visit summary for information provided to patient and family  Provisions for Follow-Up Care:  See after visit summary for information related to follow-up care and any pertinent home health orders        Disposition:     Home with [facility] (Reminder: Complete face to face encounter)    For Discharges to [facility] SNF:   · Not Applicable to this Patient - Not Applicable to this Patient    Planned Readmission: no     Discharge Statement:  I spent 40 minutes discharging the patient  This time was spent on the day of discharge  I had direct contact with the patient on the day of discharge  Greater than 50% of the total time was spent examining patient, answering all patient questions, arranging and discussing plan of care with patient as well as directly providing post-discharge instructions  Additional time then spent on discharge activities  Discharge Medications:  See after visit summary for reconciled discharge medications provided to patient and family        ** Please Note: This note has been constructed using a voice recognition system **

## 2022-10-17 NOTE — PROGRESS NOTES
Pastoral Care Progress Note    10/17/2022  Patient: Fili Chen : 1955  Admission Date & Time: 10/14/2022 0856  MRN: [id number] CSN: [id number]       made very brief introductory visit to Pt and his spouse, made them aware of  availability                 Chaplaincy Interventions Utilized:   Empowerment: Clarified, confirmed, or reviewed information from treatment team      10/17/22 1300   Clinical Encounter Type   Visited With Patient and family together   Routine Visit Introduction

## 2022-10-17 NOTE — TELEPHONE ENCOUNTER
Being discharged home today  Will await rescheduling for TURP   Please adjust the 11/4 postop visit to after his new TURP date when available thanks

## 2022-10-17 NOTE — CASE MANAGEMENT
Case Management Discharge Planning Note    Patient name Denys Band  Location [id number] MRN [id number]  : 1955 Date 10/17/2022       Current Admission Date: 10/14/2022  Current Admission Diagnosis:Severe sepsis [facility])   Patient Active Problem List    Diagnosis Date Noted   • Diarrhea    • Complicated UTI (urinary tract infection) 10/14/2022   • Hyponatremia 10/14/2022   • Severe sepsis ([facility] ) 10/14/2022   • Perry cardiac risk 10-20% in next 10 years 2022   • Abnormal glucose 2022   • Stage 3a chronic kidney disease ([facility] ) 2022   • Primary hypertension 2022   • Constipation 2022   • Hydroureteronephrosis 2022   • Urinary (tract) obstruction 2022   • Elevated serum creatinine 2022   • Enlarged prostate with urinary obstruction 2022   • Varicose veins of both lower extremities 2022   • BMI 33 0-33 9,adult 2021   • Obesity (BMI 30-39 9) 2021   • Immunization due 2021   • Allergic rhinitis 2013      LOS (days): 3  Geometric Mean LOS (GMLOS) (days): 2 80  Days to GMLOS:-0 1     OBJECTIVE:  Risk of Unplanned Readmission Score: 8 16         Current admission status: Inpatient   Preferred Pharmacy:   [address], [address] [place] [address] [facility]  Phone: [phone] Fax: [facility], [address] [facility] [address] [facility]  Phone: [phone] Fax: [phone]    Primary Care Provider: Lyn Mendes DO    Primary Insurance: MEDICARE  Secondary Insurance: AARP    DISCHARGE DETAILS:                                          Other Referral/Resources/Interventions Provided:  Interventions: C  Referral Comments: CM advised by provider that pt is medically stable for discharge today; He will be returning home with a nathan, per Roxana LUNANP)   OLGA provided update to [facility] to advise them of same  F2F completed and information added to AVS  No further CM needs identified at this time; CM will remain available for d/c planning needs           Treatment Team Recommendation: Home with [facility]  Discharge Destination Plan[de-identified] Home with [facility]

## 2022-10-18 ENCOUNTER — TRANSITIONAL CARE MANAGEMENT (OUTPATIENT)
Dept: FAMILY MEDICINE CLINIC | Facility: CLINIC | Age: 67
End: 2022-10-18

## 2022-10-19 LAB
BACTERIA BLD CULT: NORMAL
BACTERIA BLD CULT: NORMAL

## 2022-10-19 NOTE — TELEPHONE ENCOUNTER
I spoke to the patient today, no availability at [facility] until mid December  I have reached out ot the OR to see if we can use some time at [facility] on 11/10/2022 while Dr Violet King is on MA Call  I let the patient know it may be a day or two until we have an answer

## 2022-10-20 NOTE — TELEPHONE ENCOUNTER
I spoke to the patient and confirmed 11/10/2022 at [place] with Dr Georgia Bradley      -new instruction packet mailed  -patient will go for a Urine C&S when ABX is completed  -MCR/AARP - no auth required

## 2022-10-21 ENCOUNTER — OFFICE VISIT (OUTPATIENT)
Dept: FAMILY MEDICINE CLINIC | Facility: CLINIC | Age: 67
End: 2022-10-21
Payer: MEDICARE

## 2022-10-21 VITALS
RESPIRATION RATE: 18 BRPM | OXYGEN SATURATION: 99 % | DIASTOLIC BLOOD PRESSURE: 70 MMHG | TEMPERATURE: 98.9 F | HEIGHT: 69 IN | WEIGHT: 213.6 LBS | HEART RATE: 100 BPM | SYSTOLIC BLOOD PRESSURE: 124 MMHG | BODY MASS INDEX: 31.64 KG/M2

## 2022-10-21 DIAGNOSIS — I10 PRIMARY HYPERTENSION: Primary | ICD-10-CM

## 2022-10-21 DIAGNOSIS — N13.9 URINARY OBSTRUCTION: ICD-10-CM

## 2022-10-21 DIAGNOSIS — N39.0 COMPLICATED UTI (URINARY TRACT INFECTION): ICD-10-CM

## 2022-10-21 DIAGNOSIS — R73.09 ABNORMAL GLUCOSE: ICD-10-CM

## 2022-10-21 PROCEDURE — 99496 TRANSJ CARE MGMT HIGH F2F 7D: CPT | Performed by: FAMILY MEDICINE

## 2022-10-21 RX ORDER — TAMSULOSIN HYDROCHLORIDE 0.4 MG/1
0.4 CAPSULE ORAL
Qty: 90 CAPSULE | Refills: 1 | Status: SHIPPED | OUTPATIENT
Start: 2022-10-21 | End: 2022-11-20

## 2022-10-21 RX ORDER — AMLODIPINE BESYLATE 10 MG/1
10 TABLET ORAL DAILY
Qty: 90 TABLET | Refills: 1 | Status: SHIPPED | OUTPATIENT
Start: 2022-10-21 | End: 2022-11-20

## 2022-10-21 NOTE — PROGRESS NOTES
Assessment/Plan:    No problem-specific Assessment & Plan notes found for this encounter  S/p uti/sepsis that postponed prostate surgery  Pt tolerating levaquin  Has f/u u c/s  F/u with urology    htn stable on meds, continue same    Elevated glucose  Plan a1c and fbs after surgery to decide on dx/tx     Diagnoses and all orders for this visit:    Primary hypertension  -     amLODIPine (NORVASC) 10 mg tablet; Take 1 tablet (10 mg total) by mouth daily    Abnormal glucose  -     Comprehensive metabolic panel; Future  -     Hemoglobin A1C; Future    Urinary obstruction  -     tamsulosin (FLOMAX) 0 4 mg; Take 1 capsule (0 4 mg total) by mouth daily with dinner    BMI 94 0-16 0,ATVYW    Complicated UTI (urinary tract infection)              Return if symptoms worsen or fail to improve  Subjective:      Patient ID: Cielo Brandon is a 69 y o  male  Chief Complaint   Patient presents with   • Transition of [address] f/u Severe sepsis nm  lpn       HPI  3 or 4 days left of levaquin 750mg/d to complete 10d of tx, with 3d done in hospital  TURP pending in 2-3w  Has post ab u c/s pending  No fever or sweats  No tendonitis  Still has tube in    Surgery planned 11/10/22    TCM Call     Date and time call was made  10/18/2022  9:08 AM    Hospital care reviewed  Records reviewed    Patient was hospitial[facility] at  [facility]    Date of Admission  10/14/22    Date of discharge  10/17/22    Diagnosis  Severe Sepsis    Disposition  Home    Were the patients medications reviewed and updated  Yes    Current Symptoms  None      TCM Call     Post hospital issues  None    Should patient be enrolled in anticoag monitoring? No    Scheduled for follow up?   Yes    Patients specialists  Urologist    Urologist name  Anthony Velasquez MD (Urology    Did you obtain your prescribed medications  Yes    Do you need help managing your prescriptions or medications  No    Is transportation to your appointment needed Yes    Specify why  He does not drive    I have advised the patient to call PCP with any new or worsening symptoms  [address] [facility] or [facility] other    Support System  Family    The type of support provided  Physical; Emotional    Do you have social support  Yes, as much as I need    Are you recieving any outpatient services  No    Are you recieving home care services  Yes    Types of home care services  Nurse visit    Have you fallen in the last 12 months  No    Interperter language line needed  No    Counseling  Patient    Counseling topics  Activities of daily living; Importance of RX compliance; patient and family education; instructions for management; Risk factor reduction; Home health agency benefits    Comments  I spoke with Niall Lopez who states he is doing fine  No c/o at this time  He knows to go to the ER if any chest pain, dypsnea, weakness, etc Bre Mccallum            The following portions of the patient's history were reviewed and updated as appropriate: allergies, current medications, past family history, past medical history, past social history, past surgical history and problem list     Review of Systems   Constitutional: Negative for fever           Current Outpatient Medications   Medication Sig Dispense Refill   • amLODIPine (NORVASC) 10 mg tablet Take 1 tablet (10 mg total) by mouth daily 90 tablet 1   • Ascorbic Acid (vitamin C) 1000 MG tablet Take 1,000 mg by mouth daily     • finasteride (PROSCAR) 5 mg tablet Take 1 tablet (5 mg total) by mouth daily (Patient taking differently: Take 5 mg by mouth daily after dinner) 90 tablet 3   • levofloxacin (LEVAQUIN) 750 mg tablet Take 1 tablet (750 mg total) by mouth every 24 hours for 7 days Do not start before October 18, 2022  7 tablet 0   • Multiple Vitamin (MULTIVITAMINS PO) Take by mouth Daily     • oxybutynin (DITROPAN) 5 mg tablet Take 1 tablet (5 mg total) by mouth 3 (three) times a day as needed (as needed) 30 tablet 3   • tamsulosin (FLOMAX) 0 4 mg Take 1 capsule (0 4 mg total) by mouth daily with dinner 90 capsule 1     No current facility-administered medications for this visit  Objective:    /70   Pulse 100   Temp 98 9 °F (37 2 °C)   Resp 18   Ht 5' 9" (1 753 m)   Wt 96 9 kg (213 lb 9 6 oz)   SpO2 99%   BMI 31 54 kg/m²        Physical Exam  Vitals and nursing note reviewed  Constitutional:       General: He is not in acute distress  Appearance: He is well-developed  He is not ill-appearing  HENT:      Head: Normocephalic  Right Ear: Tympanic membrane normal       Left Ear: Tympanic membrane normal    Eyes:      General: No scleral icterus  Conjunctiva/sclera: Conjunctivae normal    Cardiovascular:      Rate and Rhythm: Normal rate and regular rhythm  Pulmonary:      Effort: Pulmonary effort is normal  No respiratory distress  Breath sounds: No wheezing  Abdominal:      General: There is no distension  Palpations: Abdomen is soft  Tenderness: There is no abdominal tenderness  Musculoskeletal:         General: No deformity  Cervical back: Neck supple  Skin:     General: Skin is warm and dry  Coloration: Skin is not pale  Neurological:      Mental Status: He is alert  Psychiatric:         Behavior: Behavior normal          Thought Content: Thought content normal          BMI Counseling: Body mass index is 31 54 kg/m²  The BMI is above normal  Nutrition recommendations include decreasing portion sizes and moderation in carbohydrate intake  Exercise recommendations include exercising 3-5 times per week  No pharmacotherapy was ordered  Rationale for BMI follow-up plan is due to patient being overweight or obese  Depression Screening and Follow-up Plan: Patient was screened for depression during today's encounter  They screened negative with a PHQ-2 score of 0             Kay Stroud

## 2022-10-26 ENCOUNTER — APPOINTMENT (OUTPATIENT)
Dept: LAB | Facility: HOSPITAL | Age: 67
End: 2022-10-26

## 2022-10-26 DIAGNOSIS — N13.8 ENLARGED PROSTATE WITH URINARY OBSTRUCTION: ICD-10-CM

## 2022-10-26 DIAGNOSIS — N40.1 ENLARGED PROSTATE WITH URINARY OBSTRUCTION: ICD-10-CM

## 2022-10-27 LAB — BACTERIA UR CULT: NORMAL

## 2022-11-02 DIAGNOSIS — Z01.818 PRE-OP TESTING: Primary | ICD-10-CM

## 2022-11-07 NOTE — PRE-PROCEDURE INSTRUCTIONS
Pre-Surgery Instructions:   Medication Instructions   • Acetaminophen (Tylenol) 325 MG CAPS Uses PRN- OK to take day of surgery   • amLODIPine (NORVASC) 10 mg tablet Take day of surgery  • Ascorbic Acid (vitamin C) 1000 MG tablet Stop taking 7 days prior to surgery  • finasteride (PROSCAR) 5 mg tablet Take night before surgery   • Multiple Vitamin (MULTIVITAMINS PO) Stop taking 7 days prior to surgery  • oxybutynin (DITROPAN) 5 mg tablet Uses PRN- DO NOT take day of surgery   • tamsulosin (FLOMAX) 0 4 mg Take night before surgery    Reviewed preoperative medication and showering instructions with patient-Pt verbalized understanding  Instructed pt to stop taking ASA,NSAIDS , non prescribed vitamins and herbal supplements 7 days before surgery  May take Tylenol if needed  Instructed to take DOS medications with small sip of water  Instructed NPO past midnight prior to surgery ---surgery department will call the day before surgery with arrival time for DOS  (If surgery on Monday will receive call Friday)    Instructed to notify surgeon and family doctor office with any changes in health prior to surgery      Patient (Pt ) verbalized understanding of all instructions

## 2022-11-09 ENCOUNTER — ANESTHESIA EVENT (OUTPATIENT)
Dept: PERIOP | Facility: HOSPITAL | Age: 67
End: 2022-11-09

## 2022-11-09 NOTE — ANESTHESIA PREPROCEDURE EVALUATION
Procedure:  TRANSURETHRAL RESECTION OF PROSTATE (TURP) (N/A Abdomen)    Relevant Problems   CARDIO   (+) Primary hypertension      /RENAL   (+) Enlarged prostate with urinary obstruction   (+) Hydroureteronephrosis   (+) Stage 3a chronic kidney disease (HCC)        Physical Exam    Airway    Mallampati score: II  TM Distance: >3 FB  Neck ROM: full     Dental       Cardiovascular  Rhythm: regular, Rate: normal, Cardiovascular exam normal    Pulmonary  Pulmonary exam normal Breath sounds clear to auscultation,     Other Findings  Multiple missing teeth  None loose  Anesthesia Plan  ASA Score- 2     Anesthesia Type- general with ASA Monitors  Additional Monitors:   Airway Plan: ETT  Comment: Risks/benefits and alternatives discussed including likely possibility of PONV and sore throat, as well as the rare possibilities of aspiration, dental/oropharyngeal/ocular injuries, or grave/life threatening anesthetic and surgical emergencies          Plan Factors-Exercise tolerance (METS): >4 METS  Patient summary reviewed  Patient instructed to abstain from smoking on day of procedure  Patient did not smoke on day of surgery  Induction- intravenous  Postoperative Plan- Plan for postoperative opioid use  Planned trial extubation    Informed Consent- Anesthetic plan and risks discussed with patient  I personally reviewed this patient with the CRNA  Discussed and agreed on the Anesthesia Plan with the CRNA  Ami Greer

## 2022-11-10 ENCOUNTER — ANESTHESIA (OUTPATIENT)
Dept: PERIOP | Facility: HOSPITAL | Age: 67
End: 2022-11-10

## 2022-11-10 ENCOUNTER — HOSPITAL ENCOUNTER (OUTPATIENT)
Facility: HOSPITAL | Age: 67
Setting detail: OUTPATIENT SURGERY
Discharge: HOME | End: 2022-11-10
Attending: UROLOGY | Admitting: UROLOGY

## 2022-11-10 ENCOUNTER — TELEPHONE (OUTPATIENT)
Dept: UROLOGY | Facility: CLINIC | Age: 67
End: 2022-11-10

## 2022-11-10 VITALS
SYSTOLIC BLOOD PRESSURE: 139 MMHG | DIASTOLIC BLOOD PRESSURE: 67 MMHG | WEIGHT: 213 LBS | HEART RATE: 74 BPM | OXYGEN SATURATION: 100 % | HEIGHT: 69 IN | BODY MASS INDEX: 31.55 KG/M2 | RESPIRATION RATE: 20 BRPM | TEMPERATURE: 97.3 F

## 2022-11-10 DIAGNOSIS — N40.1 URINARY RETENTION DUE TO BENIGN PROSTATIC HYPERPLASIA: Primary | ICD-10-CM

## 2022-11-10 DIAGNOSIS — R33.8 URINARY RETENTION DUE TO BENIGN PROSTATIC HYPERPLASIA: Primary | ICD-10-CM

## 2022-11-10 DIAGNOSIS — Z01.818 PRE-OP TESTING: ICD-10-CM

## 2022-11-10 LAB
ABO GROUP BLD: NORMAL
BLD GP AB SCN SERPL QL: NEGATIVE
GLUCOSE SERPL-MCNC: 121 MG/DL (ref 65–140)
GLUCOSE SERPL-MCNC: 125 MG/DL (ref 65–140)
RH BLD: POSITIVE
SPECIMEN EXPIRATION DATE: NORMAL

## 2022-11-10 RX ORDER — SULFAMETHOXAZOLE AND TRIMETHOPRIM 800; 160 MG/1; MG/1
1 TABLET ORAL EVERY 12 HOURS SCHEDULED
Qty: 4 TABLET | Refills: 0 | Status: SHIPPED | OUTPATIENT
Start: 2022-11-10 | End: 2022-11-12

## 2022-11-10 RX ORDER — PHENAZOPYRIDINE HYDROCHLORIDE 100 MG/1
100 TABLET, FILM COATED ORAL
Status: DISCONTINUED | OUTPATIENT
Start: 2022-11-10 | End: 2022-11-10 | Stop reason: HOSPADM

## 2022-11-10 RX ORDER — DIPHENHYDRAMINE HCL 25 MG
12.5 TABLET ORAL EVERY 6 HOURS PRN
Status: DISCONTINUED | OUTPATIENT
Start: 2022-11-10 | End: 2022-11-10 | Stop reason: HOSPADM

## 2022-11-10 RX ORDER — FENTANYL CITRATE/PF 50 MCG/ML
25 SYRINGE (ML) INJECTION
Status: DISCONTINUED | OUTPATIENT
Start: 2022-11-10 | End: 2022-11-10 | Stop reason: HOSPADM

## 2022-11-10 RX ORDER — KETOROLAC TROMETHAMINE 30 MG/ML
15 INJECTION, SOLUTION INTRAMUSCULAR; INTRAVENOUS EVERY 6 HOURS PRN
Status: DISCONTINUED | OUTPATIENT
Start: 2022-11-10 | End: 2022-11-10 | Stop reason: HOSPADM

## 2022-11-10 RX ORDER — SODIUM CHLORIDE, SODIUM LACTATE, POTASSIUM CHLORIDE, CALCIUM CHLORIDE 600; 310; 30; 20 MG/100ML; MG/100ML; MG/100ML; MG/100ML
125 INJECTION, SOLUTION INTRAVENOUS CONTINUOUS
Status: DISCONTINUED | OUTPATIENT
Start: 2022-11-10 | End: 2022-11-10 | Stop reason: HOSPADM

## 2022-11-10 RX ORDER — ACETAMINOPHEN 500 MG
500 TABLET ORAL EVERY 6 HOURS
Qty: 20 TABLET | Refills: 0 | Status: SHIPPED | OUTPATIENT
Start: 2022-11-10 | End: 2022-11-15

## 2022-11-10 RX ORDER — LIDOCAINE HYDROCHLORIDE 10 MG/ML
INJECTION, SOLUTION EPIDURAL; INFILTRATION; INTRACAUDAL; PERINEURAL AS NEEDED
Status: DISCONTINUED | OUTPATIENT
Start: 2022-11-10 | End: 2022-11-10

## 2022-11-10 RX ORDER — DICLOFENAC POTASSIUM 50 MG/1
50 TABLET, FILM COATED ORAL 2 TIMES DAILY
Qty: 10 TABLET | Refills: 0 | Status: SHIPPED | OUTPATIENT
Start: 2022-11-10 | End: 2022-11-15

## 2022-11-10 RX ORDER — ONDANSETRON 2 MG/ML
4 INJECTION INTRAMUSCULAR; INTRAVENOUS ONCE AS NEEDED
Status: DISCONTINUED | OUTPATIENT
Start: 2022-11-10 | End: 2022-11-10 | Stop reason: HOSPADM

## 2022-11-10 RX ORDER — HYDROMORPHONE HCL/PF 1 MG/ML
0.4 SYRINGE (ML) INJECTION
Status: DISCONTINUED | OUTPATIENT
Start: 2022-11-10 | End: 2022-11-10 | Stop reason: HOSPADM

## 2022-11-10 RX ORDER — MAGNESIUM HYDROXIDE 1200 MG/15ML
LIQUID ORAL AS NEEDED
Status: DISCONTINUED | OUTPATIENT
Start: 2022-11-10 | End: 2022-11-10 | Stop reason: HOSPADM

## 2022-11-10 RX ORDER — ONDANSETRON 2 MG/ML
INJECTION INTRAMUSCULAR; INTRAVENOUS AS NEEDED
Status: DISCONTINUED | OUTPATIENT
Start: 2022-11-10 | End: 2022-11-10

## 2022-11-10 RX ORDER — ONDANSETRON 2 MG/ML
4 INJECTION INTRAMUSCULAR; INTRAVENOUS EVERY 6 HOURS PRN
Status: DISCONTINUED | OUTPATIENT
Start: 2022-11-10 | End: 2022-11-10 | Stop reason: HOSPADM

## 2022-11-10 RX ORDER — FENTANYL CITRATE 50 UG/ML
INJECTION, SOLUTION INTRAMUSCULAR; INTRAVENOUS AS NEEDED
Status: DISCONTINUED | OUTPATIENT
Start: 2022-11-10 | End: 2022-11-10

## 2022-11-10 RX ORDER — ACETAMINOPHEN 325 MG/1
650 TABLET ORAL EVERY 6 HOURS PRN
Status: DISCONTINUED | OUTPATIENT
Start: 2022-11-10 | End: 2022-11-10 | Stop reason: HOSPADM

## 2022-11-10 RX ORDER — PROPOFOL 10 MG/ML
INJECTION, EMULSION INTRAVENOUS AS NEEDED
Status: DISCONTINUED | OUTPATIENT
Start: 2022-11-10 | End: 2022-11-10

## 2022-11-10 RX ORDER — ROCURONIUM BROMIDE 10 MG/ML
INJECTION, SOLUTION INTRAVENOUS AS NEEDED
Status: DISCONTINUED | OUTPATIENT
Start: 2022-11-10 | End: 2022-11-10

## 2022-11-10 RX ORDER — GLYCOPYRROLATE 0.2 MG/ML
INJECTION INTRAMUSCULAR; INTRAVENOUS AS NEEDED
Status: DISCONTINUED | OUTPATIENT
Start: 2022-11-10 | End: 2022-11-10

## 2022-11-10 RX ORDER — OXYCODONE HYDROCHLORIDE 5 MG/1
5 TABLET ORAL EVERY 4 HOURS PRN
Status: DISCONTINUED | OUTPATIENT
Start: 2022-11-10 | End: 2022-11-10 | Stop reason: HOSPADM

## 2022-11-10 RX ORDER — ACETAMINOPHEN 325 MG/1
975 TABLET ORAL ONCE
Status: COMPLETED | OUTPATIENT
Start: 2022-11-10 | End: 2022-11-10

## 2022-11-10 RX ORDER — LIDOCAINE HYDROCHLORIDE 10 MG/ML
0.5 INJECTION, SOLUTION EPIDURAL; INFILTRATION; INTRACAUDAL; PERINEURAL ONCE AS NEEDED
Status: DISCONTINUED | OUTPATIENT
Start: 2022-11-10 | End: 2022-11-10 | Stop reason: HOSPADM

## 2022-11-10 RX ORDER — DIPHENHYDRAMINE HYDROCHLORIDE 50 MG/ML
12.5 INJECTION INTRAMUSCULAR; INTRAVENOUS ONCE AS NEEDED
Status: DISCONTINUED | OUTPATIENT
Start: 2022-11-10 | End: 2022-11-10 | Stop reason: HOSPADM

## 2022-11-10 RX ORDER — SENNOSIDES 8.6 MG
8.6 TABLET ORAL
Qty: 5 TABLET | Refills: 0 | Status: SHIPPED | OUTPATIENT
Start: 2022-11-10 | End: 2022-11-15

## 2022-11-10 RX ORDER — OXYCODONE HYDROCHLORIDE 5 MG/1
10 TABLET ORAL EVERY 4 HOURS PRN
Status: DISCONTINUED | OUTPATIENT
Start: 2022-11-10 | End: 2022-11-10 | Stop reason: SDUPTHER

## 2022-11-10 RX ORDER — CEFAZOLIN SODIUM 2 G/50ML
2000 SOLUTION INTRAVENOUS ONCE
Status: COMPLETED | OUTPATIENT
Start: 2022-11-10 | End: 2022-11-10

## 2022-11-10 RX ADMIN — LIDOCAINE HYDROCHLORIDE 50 MG: 10 INJECTION, SOLUTION EPIDURAL; INFILTRATION; INTRACAUDAL at 13:18

## 2022-11-10 RX ADMIN — GLYCOPYRROLATE 0.2 MCG: 0.2 INJECTION, SOLUTION INTRAMUSCULAR; INTRAVENOUS at 13:48

## 2022-11-10 RX ADMIN — SUGAMMADEX 200 MG: 100 INJECTION, SOLUTION INTRAVENOUS at 14:22

## 2022-11-10 RX ADMIN — ROCURONIUM BROMIDE 30 MG: 10 SOLUTION INTRAVENOUS at 13:19

## 2022-11-10 RX ADMIN — CEFAZOLIN SODIUM 2000 MG: 2 SOLUTION INTRAVENOUS at 13:22

## 2022-11-10 RX ADMIN — ACETAMINOPHEN 975 MG: 325 TABLET ORAL at 11:19

## 2022-11-10 RX ADMIN — FENTANYL CITRATE 50 MCG: 50 INJECTION, SOLUTION INTRAMUSCULAR; INTRAVENOUS at 13:45

## 2022-11-10 RX ADMIN — SODIUM CHLORIDE, SODIUM LACTATE, POTASSIUM CHLORIDE, AND CALCIUM CHLORIDE 125 ML/HR: .6; .31; .03; .02 INJECTION, SOLUTION INTRAVENOUS at 11:19

## 2022-11-10 RX ADMIN — FENTANYL CITRATE 50 MCG: 50 INJECTION, SOLUTION INTRAMUSCULAR; INTRAVENOUS at 13:18

## 2022-11-10 RX ADMIN — ONDANSETRON 4 MG: 2 INJECTION INTRAMUSCULAR; INTRAVENOUS at 14:22

## 2022-11-10 RX ADMIN — PROPOFOL 200 MG: 10 INJECTION, EMULSION INTRAVENOUS at 13:18

## 2022-11-10 NOTE — TELEPHONE ENCOUNTER
Status post TURP, please arrange for catheter removal and trial of void early in the coming week    Will need a visit in 2-3 weeks for pathology review and PVR

## 2022-11-10 NOTE — INTERVAL H&P NOTE
H&P reviewed  After examining the patient I find no changes in the patients condition since the H&P had been written      Vitals:    11/10/22 1106   BP: 158/79   Pulse: 84   Resp: 18   Temp: 98 9 °F (37 2 °C)   SpO2: 98%       Proceed to TURP

## 2022-11-10 NOTE — OR NURSING
18 [place] nathan with 10mL saline removed in the OR prior to procedure start  Nathan present upon admission to the OR, unknown when it was placed  Leg bag was present as well  Nathan removed per Dr Jade Navas order

## 2022-11-10 NOTE — ANESTHESIA POSTPROCEDURE EVALUATION
Post-Op Assessment Note    CV Status:  Stable    Pain management: adequate     Mental Status:  Alert and awake   Hydration Status:  Euvolemic   PONV Controlled:  Controlled   Airway Patency:  Patent      Post Op Vitals Reviewed: Yes            No complications documented      BP   127/76   Temp   97 1   Pulse  92   Resp   14   SpO2   98% RA

## 2022-11-10 NOTE — TELEPHONE ENCOUNTER
Patient already scheduled for void trial and post-op visit  Patient currently admitted  Will call once d/c to review post-op instructions

## 2022-11-10 NOTE — OP NOTE
OPERATIVE REPORT  PATIENT NAME: Gareth Reyes    :  1955  MRN: [id number]  Pt Location: AN OR ROOM 05    SURGERY DATE: 11/10/2022    Surgeon(s) and Role:     * Marin Xavier MD - Primary    Preop Diagnosis:  Urinary retention due to benign prostatic hyperplasia [N40 1, R33 8]    Post-Op Diagnosis Codes:     * Urinary retention due to benign prostatic hyperplasia [N40 1, R33 8]    Procedure(s) (LRB):  TRANSURETHRAL RESECTION OF PROSTATE (TURP) (N/A)    Specimen(s):  ID Type Source Tests Collected by Time Destination   1 : prostate chips Tissue Prostate TISSUE EXAM Marin Xavier MD 11/10/2022 1421        Estimated Blood Loss:   Minimal    Drains:  Urethral Catheter Coude 16 Fr  (Active)   Number of days: 89       Urethral Catheter Three way;Latex 24 Fr  (Active)   Number of days: 0       Anesthesia Type:   General    Operative Indications:  Urinary retention due to benign prostatic hyperplasia [N40 1, R33 8]      Operative Findings:  High-grade trilobar hypertrophy  Excellent channel through the central and transition zone after TURP today  Meticulous hemostasis obtained  Plan for CBI x1 hour with discharge home later today with Reyes catheter in place    Complications:   None    Procedure and Technique:        INDICATIONS:    Mr Edwina Queen is a pleasant 69y o  year old man whose outpatient urologic workup revealed trilobar hypertrophy with an elevated bladder neck and urinary retention which was catheter dependent  Indications for Transurethral resection of prostate were discussed with him at length including all risks, benefits, and alternatives of this procedure  Specific risks relevant to this procedure are infection, bleeding, pain, risk of failure of procedure, risk of the need for secondary procedures, risk of urethral or bladder neck stricture or contraction, risk of ejaculatory dysfunction with subsequent fertility issues, risk of tissue regrowth, and risks of anesthesia   Benefits of this procedure include treatment of urinary retention, removal of vascular prostatic adenomatous tissue, potential decreased risk of recurrent urinary tract infection, and treatment of bladder outlet obstruction  Alternatives to this procedure include suprapubic catheter placement, clean intermittent catheterization, and continued medical therapy  After weighing the above information, the patient wished to proceed with the procedure, and witnessed informed consent was obtained  PROCEDURE SUMMARY:    The patient was brought to the operating room and placed in the supine position for the induction of anesthesia  The patient was then placed in the lithotomy position and prepped and draped using standard sterile technique  All pressure points were carefully padded  A full surgical time-out occurred during which the proper patient, position, and procedure were verified  Intravenous antibiotics were then administered as per the guidelines, and thromboembolism prophylaxis was administered in the form of sequential compression devices  A 27 F resectoscope was inserted into the urethra     Cystoscopy revealed trilobar hypertrophy with elevation of the bladder neck  The location of the ureteral orifices was identified and respected  Resection of the prostatic adenomatous tissue was carried down to the floor of the bladder  It was then extended out laterally towards the prostatic capsule which was visualized but not violated  The distal margin of the resection was the veru montanum which was also left intact  Hemostasis was obtained using electrocautery after ensuring that the patient's blood pressure was not artificially low due to general anesthesia    All prostate chips were evacuated from the bladder using an Ellick evacuator, and a clean bladder was confirmed under direct vision  Each ureteral orifice was again visualized and remained intact at the end of the case   The specimen was submitted for permanent pathology  A 24 Faroese three-way Reyes catheter was inserted per urethra and was connected to bladder irrigation, which was running clear prior to terminating the procedure  A belladonna and opium suppository was administered in the operating room  The patient was awakened and then transported to PACU in stable condition  There were no complications and all instrument and sponge counts were correct  DISPOSITION:   PACU to the floor  SPECIMENS:  Prostate Chips    Plan:  The plan going forward will be for CBI x1 hour followed by the Reyes catheter going to leg bag  He will be discharged home with his catheter later today    We will arrange for Reyes catheter removal early in the coming week           I was present for the entire procedure and A qualified resident physician was not available    Patient Disposition:  PACU         SIGNATURE: Jason Mayorga MD  DATE: November 10, 2022  TIME: 2:28 PM

## 2022-11-10 NOTE — DISCHARGE INSTRUCTIONS
Camelia Mcrae,    Today you had a transurethral resection of prostate  In this procedure we opened a channel through a highly obstructing prostate  This went very well  You lost roughly a couple of tablespoons worth of blood which is not very much  We will have your catheter removed early in the coming week  My hope is that you urinate well and without issue  This is the likely outcome  Urgency and frequency of urination along with some blood in the urine are not uncommon after surgery like this  This can resolve and then return 4 weeks later as the initial scabs fall off  While your catheter is in place, please ensure that your foreskin is down over the head of the penis to avoid paraphimosis which is when the skin gets stuck around the corona or much room rim of the penis  If you have questions or concerns as you recover, please let me know    Thank you for trusting me to take care of you today,  Dr Mariluz Delgado

## 2022-11-11 NOTE — TELEPHONE ENCOUNTER
Post Op Note    Madeline Watson is a 69 y o  male s/p TURP performed 11/10/2022  Madeline Watson is a patient of Dr Dylan Stevens and is seen at the [facility] office  How would you rate your pain on a scale from 1 to 10, 10 being the worst pain ever? 1  Have you had a fever? No  Have your bowel movements been regular? Yes  Do you have any difficulty urinating? No  If the patient has an incision- do you have any redness around the incision or any drainage from the incision? No  If the patient has a drain- are you having any issues with the drain? No  If the patient has a nathan- are you comfortable caring for your nathan? Yes Is it draining urine? Yes  Do you have any other questions or concerns that I can address at this time? Reviewed medications  Patient has no current questions or concerns   Patient confirm void trial

## 2022-11-14 ENCOUNTER — PROCEDURE VISIT (OUTPATIENT)
Dept: UROLOGY | Facility: CLINIC | Age: 67
End: 2022-11-14

## 2022-11-14 VITALS
HEIGHT: 69 IN | BODY MASS INDEX: 31.84 KG/M2 | DIASTOLIC BLOOD PRESSURE: 70 MMHG | WEIGHT: 215 LBS | SYSTOLIC BLOOD PRESSURE: 130 MMHG

## 2022-11-14 DIAGNOSIS — R33.8 ACUTE RETENTION OF URINE: Primary | ICD-10-CM

## 2022-11-14 LAB — POST-VOID RESIDUAL VOLUME, ML POC: 75 ML

## 2022-11-14 NOTE — PROGRESS NOTES
11/14/2022    Zahraa Hamilton  1955  [id number]    Diagnosis  Chief Complaint     Benign Prostatic Hypertrophy          Patient presents for void trial managed by Dr Lewis Olp  Return as scheduled 11/23/22    Procedure Reyes removal/voiding trial    Reyes catheter removed after deflation of an intact balloon  Patient tolerated well  Encouraged patient to hydrate well and return this afternoon for post void residual   he knows he may return early if uncomfortable and unable to urinate  Patient agrees to this plan  Patient returned this afternoon  Patient states able to void  Patient voided while in office  Bladder ultrasound performed and PVR measured 75ml    Patient feels good and comfortable    Recent Results (from the past 4 hour(s))   POCT Measure PVR    Collection Time: 11/14/22  3:09 PM   Result Value Ref Range    POST-VOID RESIDUAL VOLUME, ML POC 75 mL           Vitals:    11/14/22 0906   BP: 130/70   Weight: 97 5 kg (215 lb)   Height: 5' 9" (1 753 m)           Donna Cardoso RN,BSN

## 2022-11-23 ENCOUNTER — OFFICE VISIT (OUTPATIENT)
Dept: UROLOGY | Facility: CLINIC | Age: 67
End: 2022-11-23

## 2022-11-23 VITALS
HEART RATE: 75 BPM | OXYGEN SATURATION: 99 % | HEIGHT: 69 IN | SYSTOLIC BLOOD PRESSURE: 146 MMHG | WEIGHT: 215 LBS | DIASTOLIC BLOOD PRESSURE: 82 MMHG | BODY MASS INDEX: 31.84 KG/M2

## 2022-11-23 DIAGNOSIS — N13.30 HYDRONEPHROSIS, UNSPECIFIED HYDRONEPHROSIS TYPE: ICD-10-CM

## 2022-11-23 DIAGNOSIS — N13.8 ENLARGED PROSTATE WITH URINARY OBSTRUCTION: Primary | ICD-10-CM

## 2022-11-23 DIAGNOSIS — N40.1 ENLARGED PROSTATE WITH URINARY OBSTRUCTION: Primary | ICD-10-CM

## 2022-11-23 LAB
POST-VOID RESIDUAL VOLUME, ML POC: 75 ML
SL AMB  POCT GLUCOSE, UA: ABNORMAL
SL AMB LEUKOCYTE ESTERASE,UA: ABNORMAL
SL AMB POCT BILIRUBIN,UA: ABNORMAL
SL AMB POCT BLOOD,UA: ABNORMAL
SL AMB POCT CLARITY,UA: CLEAR
SL AMB POCT COLOR,UA: YELLOW
SL AMB POCT KETONES,UA: ABNORMAL
SL AMB POCT NITRITE,UA: ABNORMAL
SL AMB POCT PH,UA: 6.5
SL AMB POCT SPECIFIC GRAVITY,UA: 1.01
SL AMB POCT URINE PROTEIN: ABNORMAL
SL AMB POCT UROBILINOGEN: 0.2

## 2022-11-23 RX ORDER — FINASTERIDE 5 MG/1
5 TABLET, FILM COATED ORAL DAILY
Qty: 90 TABLET | Refills: 3 | Status: SHIPPED | OUTPATIENT
Start: 2022-11-23

## 2022-12-14 ENCOUNTER — APPOINTMENT (OUTPATIENT)
Dept: LAB | Facility: HOSPITAL | Age: 67
End: 2022-12-14

## 2022-12-14 DIAGNOSIS — R73.09 ABNORMAL GLUCOSE: ICD-10-CM

## 2022-12-14 PROBLEM — N39.0 COMPLICATED UTI (URINARY TRACT INFECTION): Status: RESOLVED | Noted: 2022-10-14 | Resolved: 2022-12-14

## 2022-12-14 LAB
ALBUMIN SERPL BCP-MCNC: 3.8 G/DL (ref 3.5–5)
ALP SERPL-CCNC: 85 U/L (ref 46–116)
ALT SERPL W P-5'-P-CCNC: 20 U/L (ref 12–78)
ANION GAP SERPL CALCULATED.3IONS-SCNC: 3 MMOL/L (ref 4–13)
AST SERPL W P-5'-P-CCNC: 11 U/L (ref 5–45)
BILIRUB SERPL-MCNC: 1.03 MG/DL (ref 0.2–1)
BUN SERPL-MCNC: 17 MG/DL (ref 5–25)
CALCIUM SERPL-MCNC: 9.8 MG/DL (ref 8.3–10.1)
CHLORIDE SERPL-SCNC: 106 MMOL/L (ref 96–108)
CO2 SERPL-SCNC: 29 MMOL/L (ref 21–32)
CREAT SERPL-MCNC: 0.92 MG/DL (ref 0.6–1.3)
EST. AVERAGE GLUCOSE BLD GHB EST-MCNC: 123 MG/DL
GFR SERPL CREATININE-BSD FRML MDRD: 84 ML/MIN/1.73SQ M
GLUCOSE P FAST SERPL-MCNC: 136 MG/DL (ref 65–99)
HBA1C MFR BLD: 5.9 %
POTASSIUM SERPL-SCNC: 3.9 MMOL/L (ref 3.5–5.3)
PROT SERPL-MCNC: 7.9 G/DL (ref 6.4–8.4)
SODIUM SERPL-SCNC: 138 MMOL/L (ref 135–147)

## 2023-02-14 ENCOUNTER — HOSPITAL ENCOUNTER (OUTPATIENT)
Dept: RADIOLOGY | Facility: HOSPITAL | Age: 68
Discharge: HOME | End: 2023-02-14

## 2023-02-14 DIAGNOSIS — N13.30 HYDRONEPHROSIS, UNSPECIFIED HYDRONEPHROSIS TYPE: ICD-10-CM

## 2023-03-28 ENCOUNTER — OFFICE VISIT (OUTPATIENT)
Dept: UROLOGY | Facility: CLINIC | Age: 68
End: 2023-03-28

## 2023-03-28 VITALS
BODY MASS INDEX: 31.84 KG/M2 | SYSTOLIC BLOOD PRESSURE: 130 MMHG | WEIGHT: 215 LBS | OXYGEN SATURATION: 98 % | HEART RATE: 88 BPM | HEIGHT: 69 IN | DIASTOLIC BLOOD PRESSURE: 72 MMHG

## 2023-03-28 DIAGNOSIS — N13.8 ENLARGED PROSTATE WITH URINARY OBSTRUCTION: Primary | ICD-10-CM

## 2023-03-28 DIAGNOSIS — N40.1 ENLARGED PROSTATE WITH URINARY OBSTRUCTION: Primary | ICD-10-CM

## 2023-03-28 LAB — POST-VOID RESIDUAL VOLUME, ML POC: 121 ML

## 2023-03-29 NOTE — PROGRESS NOTES
"  UROLOGY PROGRESS NOTE   Patient Identifiers: Edmond Travis (MRN [id number])  Date of Service: 3/29/2023    Subjective:   64-year-old man history of BPH and urinary retention  He had a TURP in November  Pathology was benign  He reports good urinary control no hematuria no further voiding complaints  Reason for visit: BPH/TURP follow-up    Objective:     VITALS:    Vitals:    03/28/23 1504   BP: 130/72   Pulse: 88   SpO2: 98%           LABS:  Lab Results   Component Value Date    HGB 13 0 10/17/2022    HCT 39 6 10/17/2022    WBC 6 16 10/17/2022     10/17/2022   ]    Lab Results   Component Value Date    K 3 9 12/14/2022     12/14/2022    CO2 29 12/14/2022    BUN 17 12/14/2022    CREATININE 0 92 12/14/2022    CALCIUM 9 8 12/14/2022   ]        INPATIENT MEDS:    Current Outpatient Medications:   •  Acetaminophen (Tylenol) 325 MG CAPS, Take by mouth, Disp: , Rfl:   •  amLODIPine (NORVASC) 10 mg tablet, Take 1 tablet (10 mg total) by mouth daily, Disp: 90 tablet, Rfl: 1  •  Ascorbic Acid (vitamin C) 1000 MG tablet, Take 1,000 mg by mouth daily, Disp: , Rfl:   •  finasteride (PROSCAR) 5 mg tablet, Take 1 tablet (5 mg total) by mouth daily, Disp: 90 tablet, Rfl: 3  •  Multiple Vitamin (MULTIVITAMINS PO), Take by mouth Daily, Disp: , Rfl:   •  diclofenac potassium (CATAFLAM) 50 mg tablet, Take 1 tablet (50 mg total) by mouth 2 (two) times a day for 5 days, Disp: 10 tablet, Rfl: 0  •  senna (SENOKOT) 8 6 mg, Take 1 tablet (8 6 mg total) by mouth daily at bedtime for 5 days, Disp: 5 tablet, Rfl: 0      Physical Exam:   /72 (BP Location: Left arm, Patient Position: Sitting)   Pulse 88   Ht 5' 9\" (1 753 m)   Wt 97 5 kg (215 lb)   SpO2 98%   BMI 31 75 kg/m²   GEN: no acute distress    RESP: Nonlabored breathing  ABD: Soft  INCISION:    EXT: Moves all extremities      RADIOLOGY:   None    Assessment:   #1    BPH/TURP    Plan:   -Follow-up in 6 months for his annual exam  -  -  -          "

## 2023-08-02 DIAGNOSIS — I10 PRIMARY HYPERTENSION: ICD-10-CM

## 2023-08-03 RX ORDER — AMLODIPINE BESYLATE 10 MG/1
10 TABLET ORAL DAILY
Qty: 90 TABLET | Refills: 0 | Status: SHIPPED | OUTPATIENT
Start: 2023-08-03

## 2023-08-23 ENCOUNTER — RA CDI HCC (OUTPATIENT)
Dept: OTHER | Facility: HOSPITAL | Age: 68
End: 2023-08-23

## 2023-08-26 ENCOUNTER — TELEPHONE (OUTPATIENT)
Dept: FAMILY MEDICINE CLINIC | Facility: CLINIC | Age: 68
End: 2023-08-26

## 2023-08-29 ENCOUNTER — OFFICE VISIT (OUTPATIENT)
Dept: FAMILY MEDICINE CLINIC | Facility: CLINIC | Age: 68
End: 2023-08-29
Payer: MEDICARE

## 2023-08-29 VITALS
BODY MASS INDEX: 35.77 KG/M2 | TEMPERATURE: 99.1 F | HEIGHT: 68 IN | RESPIRATION RATE: 18 BRPM | WEIGHT: 236 LBS | DIASTOLIC BLOOD PRESSURE: 80 MMHG | HEART RATE: 120 BPM | SYSTOLIC BLOOD PRESSURE: 152 MMHG

## 2023-08-29 DIAGNOSIS — Z13.83 SCREENING FOR CARDIOVASCULAR, RESPIRATORY, AND GENITOURINARY DISEASES: ICD-10-CM

## 2023-08-29 DIAGNOSIS — I10 PRIMARY HYPERTENSION: ICD-10-CM

## 2023-08-29 DIAGNOSIS — N18.31 CHRONIC KIDNEY DISEASE, STAGE 3A (HCC): ICD-10-CM

## 2023-08-29 DIAGNOSIS — Z00.00 MEDICARE ANNUAL WELLNESS VISIT, SUBSEQUENT: Primary | ICD-10-CM

## 2023-08-29 DIAGNOSIS — Z13.1 SCREENING FOR DIABETES MELLITUS (DM): ICD-10-CM

## 2023-08-29 DIAGNOSIS — Z12.11 COLON CANCER SCREENING: ICD-10-CM

## 2023-08-29 DIAGNOSIS — Z13.89 SCREENING FOR CARDIOVASCULAR, RESPIRATORY, AND GENITOURINARY DISEASES: ICD-10-CM

## 2023-08-29 DIAGNOSIS — Z13.6 SCREENING FOR CARDIOVASCULAR, RESPIRATORY, AND GENITOURINARY DISEASES: ICD-10-CM

## 2023-08-29 DIAGNOSIS — R73.09 ABNORMAL GLUCOSE: ICD-10-CM

## 2023-08-29 DIAGNOSIS — Z12.5 PROSTATE CANCER SCREENING: ICD-10-CM

## 2023-08-29 DIAGNOSIS — Z23 IMMUNIZATION DUE: ICD-10-CM

## 2023-08-29 PROCEDURE — G0009 ADMIN PNEUMOCOCCAL VACCINE: HCPCS

## 2023-08-29 PROCEDURE — 99214 OFFICE O/P EST MOD 30 MIN: CPT | Performed by: FAMILY MEDICINE

## 2023-08-29 PROCEDURE — G0439 PPPS, SUBSEQ VISIT: HCPCS | Performed by: FAMILY MEDICINE

## 2023-08-29 PROCEDURE — 90677 PCV20 VACCINE IM: CPT

## 2023-08-29 RX ORDER — LISINOPRIL 20 MG/1
20 TABLET ORAL DAILY
Qty: 90 TABLET | Refills: 1 | Status: SHIPPED | OUTPATIENT
Start: 2023-08-29

## 2023-08-29 RX ORDER — AMLODIPINE BESYLATE 10 MG/1
10 TABLET ORAL DAILY
Qty: 90 TABLET | Refills: 1 | Status: SHIPPED | OUTPATIENT
Start: 2023-08-29

## 2023-08-29 NOTE — PROGRESS NOTES
Assessment/Plan:    No problem-specific Assessment & Plan notes found for this encounter. Diet controlled DM in past  Has gained wt  Wants to check labs after some wt loss attempt    htn not at goal  Add lisinopril  Cough risk advised  F/u 1m    ckd3  Stay hydrated    bph  F/u urology  Check yearly psa    bmi noted     Diagnoses and all orders for this visit:    Medicare annual wellness visit, subsequent    Colon cancer screening  -     Ambulatory referral to Gastroenterology; Future    Screening for cardiovascular, respiratory, and genitourinary diseases  -     Lipid Panel with Direct LDL reflex; Future    Screening for diabetes mellitus (DM)  -     Comprehensive metabolic panel; Future    Abnormal glucose  -     Comprehensive metabolic panel; Future  -     Hemoglobin A1C; Future    Prostate cancer screening  -     PSA, Total Screen; Future    Immunization due  -     Pneumococcal Conjugate Vaccine 20-valent (Pcv20)    Primary hypertension  -     lisinopril (ZESTRIL) 20 mg tablet; Take 1 tablet (20 mg total) by mouth daily  -     amLODIPine (NORVASC) 10 mg tablet; Take 1 tablet (10 mg total) by mouth daily    Chronic kidney disease, stage 3a ([address])        Return in about 1 month (around 9/29/2023) for Recheck. Subjective:      Patient ID: Mo Reyes is a 77 y.o. male. Chief Complaint   Patient presents with   • Follow-up   • Hypertension     Sas/cma       HPI  May move to [facility]  Some wt gain  htn goals aware  Diet controlled DM in past but has gained wt    The following portions of the patient's history were reviewed and updated as appropriate: allergies, current medications, past family history, past medical history, past social history, past surgical history and problem list.    Review of Systems   Constitutional: Positive for unexpected weight change. Negative for fever.          Current Outpatient Medications   Medication Sig Dispense Refill   • Acetaminophen (Tylenol) 325 MG CAPS Take by mouth • amLODIPine (NORVASC) 10 mg tablet Take 1 tablet (10 mg total) by mouth daily 90 tablet 1   • Ascorbic Acid (vitamin C) 1000 MG tablet Take 1,000 mg by mouth daily     • finasteride (PROSCAR) 5 mg tablet Take 1 tablet (5 mg total) by mouth daily 90 tablet 3   • lisinopril (ZESTRIL) 20 mg tablet Take 1 tablet (20 mg total) by mouth daily 90 tablet 1   • Multiple Vitamin (MULTIVITAMINS PO) Take by mouth Daily     • diclofenac potassium (CATAFLAM) 50 mg tablet Take 1 tablet (50 mg total) by mouth 2 (two) times a day for 5 days 10 tablet 0   • senna (SENOKOT) 8.6 mg Take 1 tablet (8.6 mg total) by mouth daily at bedtime for 5 days 5 tablet 0     No current facility-administered medications for this visit. Objective:    /80   Pulse (!) 120   Temp 99.1 °F (37.3 °C)   Resp 18   Ht 5' 8" (1.727 m)   Wt 107 kg (236 lb)   BMI 35.88 kg/m²        Physical Exam  Vitals and nursing note reviewed. Constitutional:       General: He is not in acute distress. Appearance: He is well-developed. He is obese. He is not ill-appearing. HENT:      Head: Normocephalic. Right Ear: Tympanic membrane normal.      Left Ear: Tympanic membrane normal.   Eyes:      General: No scleral icterus. Conjunctiva/sclera: Conjunctivae normal.   Cardiovascular:      Rate and Rhythm: Normal rate and regular rhythm. Pulmonary:      Effort: Pulmonary effort is normal. No respiratory distress. Breath sounds: No wheezing. Abdominal:      General: There is no distension. Palpations: Abdomen is soft. Tenderness: There is no abdominal tenderness. Musculoskeletal:         General: No deformity. Cervical back: Neck supple. Skin:     General: Skin is warm and dry. Coloration: Skin is not pale. Neurological:      Mental Status: He is alert. Motor: No weakness. Gait: Gait normal.   Psychiatric:         Behavior: Behavior normal.         Thought Content:  Thought content normal.       BMI Counseling: Body mass index is 35.88 kg/m². The BMI is above normal. Nutrition recommendations include decreasing portion sizes and moderation in carbohydrate intake. Exercise recommendations include exercising 3-5 times per week. No pharmacotherapy was ordered. Rationale for BMI follow-up plan is due to patient being overweight or obese.               Jennifer Garcia, DO
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
## 2023-08-30 NOTE — PATIENT INSTRUCTIONS
Medicare Preventive Visit Patient Instructions  Thank you for completing your Welcome to Medicare Visit or Medicare Annual Wellness Visit today. Your next wellness visit will be due in one year (8/30/2024). The screening/preventive services that you may require over the next 5-10 years are detailed below. Some tests may not apply to you based off risk factors and/or age. Screening tests ordered at today's visit but not completed yet may show as past due. Also, please note that scanned in results may not display below. Preventive Screenings:  Service Recommendations Previous Testing/Comments   Colorectal Cancer Screening  · Colonoscopy    · Fecal Occult Blood Test (FOBT)/Fecal Immunochemical Test (FIT)  · Fecal DNA/Cologuard Test  · Flexible Sigmoidoscopy Age: 43-73 years old   Colonoscopy: every 10 years (May be performed more frequently if at higher risk)  OR  FOBT/FIT: every 1 year  OR  Cologuard: every 3 years  OR  Sigmoidoscopy: every 5 years  Screening may be recommended earlier than age 39 if at higher risk for colorectal cancer. Also, an individualized decision between you and your healthcare provider will decide whether screening between the ages of 77-80 would be appropriate. Colonoscopy: Not on file  FOBT/FIT: Not on file  Cologuard: Not on file  Sigmoidoscopy: Not on file          Prostate Cancer Screening Individualized decision between patient and health care provider in men between ages of 53-66   Medicare will cover every 12 months beginning on the day after your 50th birthday PSA: 2.7 ng/mL           Hepatitis C Screening Once for adults born between 1945 and 1965  More frequently in patients at high risk for Hepatitis C Hep C Antibody: 04/08/2022        Diabetes Screening 1-2 times per year if you're at risk for diabetes or have pre-diabetes Fasting glucose: 136 mg/dL (12/14/2022)  A1C: 5.9 % (12/14/2022)      Cholesterol Screening Once every 5 years if you don't have a lipid disorder.  May order more often based on risk factors. Lipid panel: 04/08/2022         Other Preventive Screenings Covered by Medicare:  1. Abdominal Aortic Aneurysm (AAA) Screening: covered once if your at risk. You're considered to be at risk if you have a family history of AAA or a male between the age of 70-76 who smoking at least 100 cigarettes in your lifetime. 2. Lung Cancer Screening: covers low dose CT scan once per year if you meet all of the following conditions: (1) Age 48-67; (2) No signs or symptoms of lung cancer; (3) Current smoker or have quit smoking within the last 15 years; (4) You have a tobacco smoking history of at least 20 pack years (packs per day x number of years you smoked); (5) You get a written order from a healthcare provider. 3. Glaucoma Screening: covered annually if you're considered high risk: (1) You have diabetes OR (2) Family history of glaucoma OR (3)  aged 48 and older OR (3)  American aged 72 and older  3. Osteoporosis Screening: covered every 2 years if you meet one of the following conditions: (1) Have a vertebral abnormality; (2) On glucocorticoid therapy for more than 3 months; (3) Have primary hyperparathyroidism; (4) On osteoporosis medications and need to assess response to drug therapy. 5. HIV Screening: covered annually if you're between the age of 14-79. Also covered annually if you are younger than 13 and older than 72 with risk factors for HIV infection. For pregnant patients, it is covered up to 3 times per pregnancy.     Immunizations:  Immunization Recommendations   Influenza Vaccine Annual influenza vaccination during flu season is recommended for all persons aged >= 6 months who do not have contraindications   Pneumococcal Vaccine   * Pneumococcal conjugate vaccine = PCV13 (Prevnar 13), PCV15 (Vaxneuvance), PCV20 (Prevnar 20)  * Pneumococcal polysaccharide vaccine = PPSV23 (Pneumovax) Adults 20-63 years old: 1-3 doses may be recommended based on certain risk factors  Adults 72 years old: 1-2 doses may be recommended based off what pneumonia vaccine you previously received   Hepatitis B Vaccine 3 dose series if at intermediate or high risk (ex: diabetes, end stage renal disease, liver disease)   Tetanus (Td) Vaccine - COST NOT COVERED BY MEDICARE PART B Following completion of primary series, a booster dose should be given every 10 years to maintain immunity against tetanus. Td may also be given as tetanus wound prophylaxis. Tdap Vaccine - COST NOT COVERED BY MEDICARE PART B Recommended at least once for all adults. For pregnant patients, recommended with each pregnancy. Shingles Vaccine (Shingrix) - COST NOT COVERED BY MEDICARE PART B  2 shot series recommended in those aged 48 and above     Health Maintenance Due:      Topic Date Due   • Colorectal Cancer Screening  Never done   • Hepatitis C Screening  Completed     Immunizations Due:      Topic Date Due   • COVID-19 Vaccine (3 - Pfizer series) 06/19/2021   • Influenza Vaccine (1) 09/01/2023     Advance Directives   What are advance directives? Advance directives are legal documents that state your wishes and plans for medical care. These plans are made ahead of time in case you lose your ability to make decisions for yourself. Advance directives can apply to any medical decision, such as the treatments you want, and if you want to donate organs. What are the types of advance directives? There are many types of advance directives, and each state has rules about how to use them. You may choose a combination of any of the following:  · Living will: This is a written record of the treatment you want. You can also choose which treatments you do not want, which to limit, and which to stop at a certain time. This includes surgery, medicine, IV fluid, and tube feedings. · Durable power of  for healthcare [facility]):   This is a written record that states who you want to make healthcare choices for you when you are unable to make them for yourself. This person, called a proxy, is usually a family member or a friend. You may choose more than 1 proxy. · Do not resuscitate (DNR) order:  A DNR order is used in case your heart stops beating or you stop breathing. It is a request not to have certain forms of treatment, such as CPR. A DNR order may be included in other types of advance directives. · Medical directive: This covers the care that you want if you are in a coma, near death, or unable to make decisions for yourself. You can list the treatments you want for each condition. Treatment may include pain medicine, surgery, blood transfusions, dialysis, IV or tube feedings, and a ventilator (breathing machine). · Values history: This document has questions about your views, beliefs, and how you feel and think about life. This information can help others choose the care that you would choose. Why are advance directives important? An advance directive helps you control your care. Although spoken wishes may be used, it is better to have your wishes written down. Spoken wishes can be misunderstood, or not followed. Treatments may be given even if you do not want them. An advance directive may make it easier for your family to make difficult choices about your care. Weight Management   Why it is important to manage your weight:  Being overweight increases your risk of health conditions such as heart disease, high blood pressure, type 2 diabetes, and certain types of cancer. It can also increase your risk for osteoarthritis, sleep apnea, and other respiratory problems. Aim for a slow, steady weight loss. Even a small amount of weight loss can lower your risk of health problems. How to lose weight safely:  A safe and healthy way to lose weight is to eat fewer calories and get regular exercise. You can lose up about 1 pound a week by decreasing the number of calories you eat by 500 calories each day.    Healthy meal plan for weight management:  A healthy meal plan includes a variety of foods, contains fewer calories, and helps you stay healthy. A healthy meal plan includes the following:  · Eat whole-grain foods more often. A healthy meal plan should contain fiber. Fiber is the part of grains, fruits, and vegetables that is not broken down by your body. Whole-grain foods are healthy and provide extra fiber in your diet. Some examples of whole-grain foods are whole-wheat breads and pastas, oatmeal, brown rice, and bulgur. · Eat a variety of vegetables every day. Include dark, leafy greens such as spinach, kale, cris greens, and mustard greens. Eat yellow and orange vegetables such as carrots, sweet potatoes, and winter squash. · Eat a variety of fruits every day. Choose fresh or canned fruit (canned in its own juice or light syrup) instead of juice. Fruit juice has very little or no fiber. · Eat low-fat dairy foods. Drink fat-free (skim) milk or 1% milk. Eat fat-free yogurt and low-fat cottage cheese. Try low-fat cheeses such as mozzarella and other reduced-fat cheeses. · Choose meat and other protein foods that are low in fat. Choose beans or other legumes such as split peas or lentils. Choose fish, skinless poultry (chicken or turkey), or lean cuts of red meat (beef or pork). Before you cook meat or poultry, cut off any visible fat. · Use less fat and oil. Try baking foods instead of frying them. Add less fat, such as margarine, sour cream, regular salad dressing and mayonnaise to foods. Eat fewer high-fat foods. Some examples of high-fat foods include french fries, doughnuts, ice cream, and cakes. · Eat fewer sweets. Limit foods and drinks that are high in sugar. This includes candy, cookies, regular soda, and sweetened drinks. Exercise:  Exercise at least 30 minutes per day on most days of the week. Some examples of exercise include walking, biking, dancing, and swimming.  You can also fit in more physical activity by taking the stairs instead of the elevator or parking farther away from stores. Ask your healthcare provider about the best exercise plan for you. © Copyright [employer] 2018 Information is for End User's use only and may not be sold, redistributed or otherwise used for commercial purposes.  All illustrations and images included in CareNotes® are the copyrighted property of [employer]D.A[employer] Inc[employer] or [address] [place] [address]

## 2023-10-03 ENCOUNTER — OFFICE VISIT (OUTPATIENT)
Dept: FAMILY MEDICINE CLINIC | Facility: CLINIC | Age: 68
End: 2023-10-03
Payer: MEDICARE

## 2023-10-03 VITALS
SYSTOLIC BLOOD PRESSURE: 144 MMHG | DIASTOLIC BLOOD PRESSURE: 68 MMHG | BODY MASS INDEX: 35.64 KG/M2 | WEIGHT: 234.4 LBS | RESPIRATION RATE: 18 BRPM | TEMPERATURE: 97.8 F | HEART RATE: 96 BPM

## 2023-10-03 DIAGNOSIS — Z12.11 COLON CANCER SCREENING: ICD-10-CM

## 2023-10-03 DIAGNOSIS — I10 PRIMARY HYPERTENSION: Primary | ICD-10-CM

## 2023-10-03 DIAGNOSIS — N18.2 CKD (CHRONIC KIDNEY DISEASE) STAGE 2, GFR 60-89 ML/MIN: ICD-10-CM

## 2023-10-03 DIAGNOSIS — R73.09 ABNORMAL GLUCOSE: ICD-10-CM

## 2023-10-03 DIAGNOSIS — E66.9 OBESITY (BMI 30-39.9): ICD-10-CM

## 2023-10-03 PROBLEM — K59.00 CONSTIPATION: Status: RESOLVED | Noted: 2022-04-02 | Resolved: 2023-10-03

## 2023-10-03 PROBLEM — E87.1 HYPONATREMIA: Status: RESOLVED | Noted: 2022-10-14 | Resolved: 2023-10-03

## 2023-10-03 PROBLEM — R19.7 DIARRHEA: Status: RESOLVED | Noted: 2022-10-16 | Resolved: 2023-10-03

## 2023-10-03 PROBLEM — R79.89 ELEVATED SERUM CREATININE: Status: RESOLVED | Noted: 2022-03-31 | Resolved: 2023-10-03

## 2023-10-03 PROCEDURE — 99214 OFFICE O/P EST MOD 30 MIN: CPT | Performed by: FAMILY MEDICINE

## 2023-10-03 RX ORDER — LISINOPRIL 40 MG/1
40 TABLET ORAL DAILY
Qty: 90 TABLET | Refills: 1 | Status: SHIPPED | OUTPATIENT
Start: 2023-10-03

## 2023-10-03 NOTE — PROGRESS NOTES
Assessment/Plan:    No problem-specific Assessment & Plan notes found for this encounter.    htn not at goal  Increase lisinopril 20mg/d to 40mg/d    Elevated glucose  Criteria for DM2 advised  Diet and wt loss advised    ckd 2 stable, stay hydrated    bph per urology    Obesity aware, suggest wt loss    To better to control your blood pressure to goal of <140/90, please stay on the amlodipine 10mg once a day but increase the lisinopril 20mg once a day to 40mg once a day and we can recheck you at your next visit in November. Diagnoses and all orders for this visit:    Primary hypertension  -     lisinopril (ZESTRIL) 40 mg tablet; Take 1 tablet (40 mg total) by mouth daily    Colon cancer screening  -     Ambulatory referral to Gastroenterology; Future    BMI 35.0-35.9,adult    Abnormal glucose    Obesity (BMI 30-39. 9)    CKD (chronic kidney disease) stage 2, GFR 60-89 ml/min        Return in about 7 weeks (around 11/22/2023) for Next scheduled follow up. Subjective:      Patient ID: Romeo Romero is a 77 y.o. male. Chief Complaint   Patient presents with   • Follow-up     [facility] CMA        HPI  Did not move yet to [place]  Not much wt loss  Limiting carbs  Taking lisinopril 20mg/d  No cough    The following portions of the patient's history were reviewed and updated as appropriate: allergies, current medications, past family history, past medical history, past social history, past surgical history and problem list.    Review of Systems   Constitutional: Negative for fever. Respiratory: Negative for shortness of breath.           Current Outpatient Medications   Medication Sig Dispense Refill   • Acetaminophen (Tylenol) 325 MG CAPS Take by mouth     • amLODIPine (NORVASC) 10 mg tablet Take 1 tablet (10 mg total) by mouth daily 90 tablet 1   • Ascorbic Acid (vitamin C) 1000 MG tablet Take 1,000 mg by mouth daily     • finasteride (PROSCAR) 5 mg tablet Take 1 tablet (5 mg total) by mouth daily 90 tablet 3   • lisinopril (ZESTRIL) 40 mg tablet Take 1 tablet (40 mg total) by mouth daily 90 tablet 1   • Multiple Vitamin (MULTIVITAMINS PO) Take by mouth Daily     • diclofenac potassium (CATAFLAM) 50 mg tablet Take 1 tablet (50 mg total) by mouth 2 (two) times a day for 5 days 10 tablet 0   • senna (SENOKOT) 8.6 mg Take 1 tablet (8.6 mg total) by mouth daily at bedtime for 5 days 5 tablet 0     No current facility-administered medications for this visit. Objective:    /68   Pulse 96   Temp 97.8 °F (36.6 °C)   Resp 18   Wt 106 kg (234 lb 6.4 oz)   BMI 35.64 kg/m²        Physical Exam  Vitals and nursing note reviewed. Constitutional:       General: He is not in acute distress. Appearance: He is well-developed. He is obese. He is not ill-appearing. HENT:      Head: Normocephalic. Right Ear: Tympanic membrane normal.      Left Ear: Tympanic membrane normal.   Eyes:      General: No scleral icterus. Conjunctiva/sclera: Conjunctivae normal.   Cardiovascular:      Rate and Rhythm: Normal rate and regular rhythm. Heart sounds: No murmur heard. Pulmonary:      Effort: Pulmonary effort is normal. No respiratory distress. Breath sounds: No wheezing. Abdominal:      General: There is no distension. Palpations: Abdomen is soft. Tenderness: There is no abdominal tenderness. Musculoskeletal:         General: No deformity. Cervical back: Neck supple. Right lower leg: No edema. Left lower leg: No edema. Skin:     General: Skin is warm and dry. Coloration: Skin is not pale. Neurological:      Mental Status: He is alert. Motor: No weakness. Gait: Gait normal.   Psychiatric:         Mood and Affect: Mood normal.         Behavior: Behavior normal.         Thought Content:  Thought content normal.                Huel Slates, DO

## 2023-10-03 NOTE — PATIENT INSTRUCTIONS
To better to control your blood pressure to goal of <140/90, please stay on the amlodipine 10mg once a day but increase the lisinopril 20mg once a day to 40mg once a day and we can recheck you at your next visit in November. Amadeo Knight

## 2023-11-05 DIAGNOSIS — I10 PRIMARY HYPERTENSION: ICD-10-CM

## 2023-11-06 RX ORDER — AMLODIPINE BESYLATE 10 MG/1
10 TABLET ORAL DAILY
Qty: 90 TABLET | Refills: 0 | Status: SHIPPED | OUTPATIENT
Start: 2023-11-06

## 2023-11-07 LAB
ALBUMIN SERPL-MCNC: 4.6 G/DL (ref 3.9–4.9)
ALBUMIN/GLOB SERPL: 1.7 {RATIO} (ref 1.2–2.2)
ALP SERPL-CCNC: 82 IU/L (ref 44–121)
ALT SERPL-CCNC: 12 IU/L (ref 0–44)
AST SERPL-CCNC: 16 IU/L (ref 0–40)
BILIRUB SERPL-MCNC: 0.9 MG/DL (ref 0–1.2)
BUN SERPL-MCNC: 23 MG/DL (ref 8–27)
BUN/CREAT SERPL: 21 (ref 10–24)
CALCIUM SERPL-MCNC: 10.1 MG/DL (ref 8.6–10.2)
CHLORIDE SERPL-SCNC: 97 MMOL/L (ref 96–106)
CHOLEST SERPL-MCNC: 199 MG/DL (ref 100–199)
CO2 SERPL-SCNC: 22 MMOL/L (ref 20–29)
CREAT SERPL-MCNC: 1.07 MG/DL (ref 0.76–1.27)
EGFR: 75 ML/MIN/1.73
GLOBULIN SER-MCNC: 2.7 G/DL (ref 1.5–4.5)
GLUCOSE SERPL-MCNC: 134 MG/DL (ref 70–99)
HBA1C MFR BLD: 6.7 % (ref 4.8–5.6)
HDLC SERPL-MCNC: 48 MG/DL
LDLC SERPL CALC-MCNC: 133 MG/DL (ref 0–99)
MICRODELETION SYND BLD/T FISH: NORMAL
POTASSIUM SERPL-SCNC: 4.8 MMOL/L (ref 3.5–5.2)
PROT SERPL-MCNC: 7.3 G/DL (ref 6–8.5)
PSA SERPL-MCNC: 0.4 NG/ML (ref 0–4)
SODIUM SERPL-SCNC: 136 MMOL/L (ref 134–144)
TRIGL SERPL-MCNC: 97 MG/DL (ref 0–149)

## 2023-11-09 ENCOUNTER — OFFICE VISIT (OUTPATIENT)
Dept: UROLOGY | Facility: CLINIC | Age: 68
End: 2023-11-09
Payer: MEDICARE

## 2023-11-09 VITALS
RESPIRATION RATE: 16 BRPM | OXYGEN SATURATION: 99 % | HEART RATE: 110 BPM | HEIGHT: 68 IN | BODY MASS INDEX: 34.98 KG/M2 | WEIGHT: 230.8 LBS

## 2023-11-09 DIAGNOSIS — N13.9 URINARY (TRACT) OBSTRUCTION: Primary | ICD-10-CM

## 2023-11-09 PROCEDURE — 99213 OFFICE O/P EST LOW 20 MIN: CPT

## 2023-11-09 PROCEDURE — 51798 US URINE CAPACITY MEASURE: CPT
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
## 2023-11-15 ENCOUNTER — RA CDI HCC (OUTPATIENT)
Dept: OTHER | Facility: HOSPITAL | Age: 68
End: 2023-11-15

## 2023-11-16 LAB — POST-VOID RESIDUAL VOLUME, ML POC: 24 ML

## 2023-11-21 ENCOUNTER — OFFICE VISIT (OUTPATIENT)
Dept: FAMILY MEDICINE CLINIC | Facility: CLINIC | Age: 68
End: 2023-11-21
Payer: MEDICARE

## 2023-11-21 VITALS
DIASTOLIC BLOOD PRESSURE: 70 MMHG | SYSTOLIC BLOOD PRESSURE: 126 MMHG | TEMPERATURE: 98.1 F | HEIGHT: 68 IN | RESPIRATION RATE: 16 BRPM | WEIGHT: 231 LBS | BODY MASS INDEX: 35.01 KG/M2 | HEART RATE: 92 BPM

## 2023-11-21 DIAGNOSIS — Z12.11 COLON CANCER SCREENING: ICD-10-CM

## 2023-11-21 DIAGNOSIS — N18.2 CKD (CHRONIC KIDNEY DISEASE) STAGE 2, GFR 60-89 ML/MIN: ICD-10-CM

## 2023-11-21 DIAGNOSIS — I10 PRIMARY HYPERTENSION: ICD-10-CM

## 2023-11-21 DIAGNOSIS — R73.09 ABNORMAL GLUCOSE: Primary | ICD-10-CM

## 2023-11-21 DIAGNOSIS — Z91.89 FRAMINGHAM CARDIAC RISK 10-20% IN NEXT 10 YEARS: ICD-10-CM

## 2023-11-21 PROCEDURE — 99214 OFFICE O/P EST MOD 30 MIN: CPT | Performed by: FAMILY MEDICINE

## 2023-11-21 RX ORDER — AMLODIPINE BESYLATE 10 MG/1
10 TABLET ORAL DAILY
Qty: 90 TABLET | Refills: 0 | Status: SHIPPED | OUTPATIENT
Start: 2023-11-21

## 2023-11-21 NOTE — PROGRESS NOTES
Assessment/Plan:    No problem-specific Assessment & Plan notes found for this encounter. Diet controlled DM2 criteria discussed  Fbs 134 with a2c 6.7    Low carb diet and exercise advised  Could get diabetic diet instruction at [facility]  Check A1c in 3m and if over 7, appt to treat will be offered    Htn stable, continue meds    HLD, fram score aware of 21%  Declines statin at present  Aware risk will increase with dx of DM2    Bmi 35  Wt loss suggested    Ckd2  Stay well hydrated    Starting cholesterol medications ("statins") could be discussed to reduce your 10 year cardiac/stroke risk if you are willing. An option could be generic lipitor (atorvastatin) 10mg daily, if you decide. Please do your best with diet and exercise to avoid having to start medications for Diabetes. We can recheck the A1c (average sugar) level in 3 months. Lab Results   Component Value Date    HGBA1C 6.7 (H) 11/06/2023    HGBA1C 5.9 (H) 12/14/2022    HGBA1C 6.4 (H) 09/23/2022     Lab Results   Component Value Date    GLUF 136 (H) 12/14/2022    LDLCALC 133 (H) 11/06/2023    CREATININE 1.07 11/06/2023          Diagnoses and all orders for this visit:    Abnormal glucose  -     Comprehensive metabolic panel; Future  -     Hemoglobin A1C; Future  -     Comprehensive metabolic panel; Future  -     Hemoglobin A1C; Future    Colon cancer screening  -     Ambulatory referral to Gastroenterology; Future    Primary hypertension  -     Comprehensive metabolic panel; Future  -     Hemoglobin A1C; Future  -     amLODIPine (NORVASC) 10 mg tablet; Take 1 tablet (10 mg total) by mouth daily    [place] cardiac risk 10-20% in next 10 years    BMI 35.0-35.9,adult    CKD (chronic kidney disease) stage 2, GFR 60-89 ml/min        Return in about 6 months (around 5/21/2024) for Recheck. Subjective:      Patient ID: Richi Cha is a 77 y.o. male.     Chief Complaint   Patient presents with    Follow-up     Follow up lw cma       HPI  Diet improving  More salad and fruit  More chicken and fish  More salad  Some wt loss  Some walking  Labs reviewed    The following portions of the patient's history were reviewed and updated as appropriate: allergies, current medications, past family history, past medical history, past social history, past surgical history and problem list.    Review of Systems   Respiratory:  Negative for shortness of breath. Cardiovascular:  Negative for chest pain. Current Outpatient Medications   Medication Sig Dispense Refill    Acetaminophen (Tylenol) 325 MG CAPS Take by mouth      amLODIPine (NORVASC) 10 mg tablet Take 1 tablet (10 mg total) by mouth daily 90 tablet 0    Ascorbic Acid (vitamin C) 1000 MG tablet Take 1,000 mg by mouth daily      diclofenac potassium (CATAFLAM) 50 mg tablet Take 1 tablet (50 mg total) by mouth 2 (two) times a day for 5 days 10 tablet 0    finasteride (PROSCAR) 5 mg tablet Take 1 tablet (5 mg total) by mouth daily 90 tablet 3    lisinopril (ZESTRIL) 40 mg tablet Take 1 tablet (40 mg total) by mouth daily 90 tablet 1    Multiple Vitamin (MULTIVITAMINS PO) Take by mouth Daily      senna (SENOKOT) 8.6 mg Take 1 tablet (8.6 mg total) by mouth daily at bedtime for 5 days 5 tablet 0     No current facility-administered medications for this visit. Objective:    /70   Pulse 92   Temp 98.1 °F (36.7 °C) (Temporal)   Resp 16   Ht 5' 8" (1.727 m)   Wt 105 kg (231 lb)   BMI 35.12 kg/m²        Physical Exam  Vitals and nursing note reviewed. Constitutional:       General: He is not in acute distress. Appearance: He is well-developed. He is not ill-appearing. HENT:      Head: Normocephalic. Right Ear: Tympanic membrane normal.      Left Ear: Tympanic membrane normal.   Eyes:      General: No scleral icterus. Conjunctiva/sclera: Conjunctivae normal.   Cardiovascular:      Rate and Rhythm: Normal rate and regular rhythm. Heart sounds: No murmur heard.   Pulmonary: Effort: Pulmonary effort is normal. No respiratory distress. Breath sounds: No wheezing. Abdominal:      General: There is no distension. Palpations: Abdomen is soft. Tenderness: There is no abdominal tenderness. Musculoskeletal:         General: No deformity. Cervical back: Neck supple. Skin:     General: Skin is warm and dry. Coloration: Skin is not pale. Neurological:      Mental Status: He is alert. Motor: No weakness. Gait: Gait normal.   Psychiatric:         Mood and Affect: Mood normal.         Behavior: Behavior normal.         Thought Content:  Thought content normal.           The 10-year ASCVD risk score (Silviano DK, et al., 2019) is: 20.7%    Values used to calculate the score:      Age: 77 years      Sex: Male      Is Non- : No      Diabetic: No      Tobacco smoker: No      Systolic Blood Pressure: 316 mmHg      Is BP treated: Yes      HDL Cholesterol: 48 mg/dL      Total Cholesterol: 199 mg/dL    Quentin Jacinto DO

## 2023-12-02 PROBLEM — Z12.11 COLON CANCER SCREENING: Status: RESOLVED | Noted: 2023-10-03 | Resolved: 2023-12-02

## 2023-12-10 DIAGNOSIS — N13.8 ENLARGED PROSTATE WITH URINARY OBSTRUCTION: ICD-10-CM

## 2023-12-10 DIAGNOSIS — N40.1 ENLARGED PROSTATE WITH URINARY OBSTRUCTION: ICD-10-CM

## 2023-12-16 RX ORDER — FINASTERIDE 5 MG/1
5 TABLET, FILM COATED ORAL DAILY
Qty: 90 TABLET | Refills: 3 | Status: SHIPPED | OUTPATIENT
Start: 2023-12-16

## 2023-12-25 DIAGNOSIS — N13.8 ENLARGED PROSTATE WITH URINARY OBSTRUCTION: ICD-10-CM

## 2023-12-25 DIAGNOSIS — N40.1 ENLARGED PROSTATE WITH URINARY OBSTRUCTION: ICD-10-CM

## 2023-12-26 RX ORDER — FINASTERIDE 5 MG/1
5 TABLET, FILM COATED ORAL DAILY
Qty: 90 TABLET | Refills: 1 | Status: SHIPPED | OUTPATIENT
Start: 2023-12-26

## 2024-01-25 DIAGNOSIS — I10 PRIMARY HYPERTENSION: ICD-10-CM

## 2024-01-25 RX ORDER — LISINOPRIL 40 MG/1
40 TABLET ORAL DAILY
Qty: 90 TABLET | Refills: 1 | Status: SHIPPED | OUTPATIENT
Start: 2024-01-25

## 2024-01-25 RX ORDER — AMLODIPINE BESYLATE 10 MG/1
10 TABLET ORAL DAILY
Qty: 90 TABLET | Refills: 1 | Status: SHIPPED | OUTPATIENT
Start: 2024-01-25

## 2024-02-17 LAB
ALBUMIN SERPL-MCNC: 4.6 G/DL (ref 3.9–4.9)
ALBUMIN/GLOB SERPL: 1.8 {RATIO} (ref 1.2–2.2)
ALP SERPL-CCNC: 69 IU/L (ref 44–121)
ALT SERPL-CCNC: 17 IU/L (ref 0–44)
AST SERPL-CCNC: 16 IU/L (ref 0–40)
BILIRUB SERPL-MCNC: 1.1 MG/DL (ref 0–1.2)
BUN SERPL-MCNC: 24 MG/DL (ref 8–27)
BUN/CREAT SERPL: 24 (ref 10–24)
CALCIUM SERPL-MCNC: 9.9 MG/DL (ref 8.6–10.2)
CHLORIDE SERPL-SCNC: 101 MMOL/L (ref 96–106)
CO2 SERPL-SCNC: 21 MMOL/L (ref 20–29)
CREAT SERPL-MCNC: 1 MG/DL (ref 0.76–1.27)
EGFR: 81 ML/MIN/1.73
GLOBULIN SER-MCNC: 2.5 G/DL (ref 1.5–4.5)
GLUCOSE SERPL-MCNC: 125 MG/DL (ref 70–99)
HBA1C MFR BLD: 6.6 % (ref 4.8–5.6)
POTASSIUM SERPL-SCNC: 4.6 MMOL/L (ref 3.5–5.2)
PROT SERPL-MCNC: 7.1 G/DL (ref 6–8.5)
SODIUM SERPL-SCNC: 139 MMOL/L (ref 134–144)

## 2024-05-02 DIAGNOSIS — I10 PRIMARY HYPERTENSION: ICD-10-CM

## 2024-05-03 RX ORDER — AMLODIPINE BESYLATE 10 MG/1
10 TABLET ORAL DAILY
Qty: 90 TABLET | Refills: 1 | Status: SHIPPED | OUTPATIENT
Start: 2024-05-03

## 2024-05-20 ENCOUNTER — RA CDI HCC (OUTPATIENT)
Dept: OTHER | Facility: HOSPITAL | Age: 69
End: 2024-05-20

## 2024-05-24 ENCOUNTER — OFFICE VISIT (OUTPATIENT)
Dept: FAMILY MEDICINE CLINIC | Facility: CLINIC | Age: 69
End: 2024-05-24
Payer: MEDICARE

## 2024-05-24 VITALS
TEMPERATURE: 98.7 F | HEART RATE: 99 BPM | BODY MASS INDEX: 36.22 KG/M2 | DIASTOLIC BLOOD PRESSURE: 70 MMHG | SYSTOLIC BLOOD PRESSURE: 138 MMHG | WEIGHT: 239 LBS | HEIGHT: 68 IN | RESPIRATION RATE: 16 BRPM

## 2024-05-24 DIAGNOSIS — N18.2 CKD (CHRONIC KIDNEY DISEASE) STAGE 2, GFR 60-89 ML/MIN: ICD-10-CM

## 2024-05-24 DIAGNOSIS — Z13.1 SCREENING FOR DIABETES MELLITUS (DM): ICD-10-CM

## 2024-05-24 DIAGNOSIS — N40.1 ENLARGED PROSTATE WITH URINARY OBSTRUCTION: ICD-10-CM

## 2024-05-24 DIAGNOSIS — N13.8 ENLARGED PROSTATE WITH URINARY OBSTRUCTION: ICD-10-CM

## 2024-05-24 DIAGNOSIS — Z12.5 PROSTATE CANCER SCREENING: ICD-10-CM

## 2024-05-24 DIAGNOSIS — Z12.11 COLON CANCER SCREENING: ICD-10-CM

## 2024-05-24 DIAGNOSIS — Z13.220 SCREENING FOR LIPID DISORDERS: ICD-10-CM

## 2024-05-24 DIAGNOSIS — I10 PRIMARY HYPERTENSION: Primary | ICD-10-CM

## 2024-05-24 DIAGNOSIS — R73.09 ABNORMAL GLUCOSE: ICD-10-CM

## 2024-05-24 PROBLEM — N18.31 CHRONIC KIDNEY DISEASE, STAGE 3A (HCC): Status: ACTIVE | Noted: 2024-05-24

## 2024-05-24 PROCEDURE — G2211 COMPLEX E/M VISIT ADD ON: HCPCS | Performed by: FAMILY MEDICINE

## 2024-05-24 PROCEDURE — 99214 OFFICE O/P EST MOD 30 MIN: CPT | Performed by: FAMILY MEDICINE

## 2024-05-24 RX ORDER — LISINOPRIL 40 MG/1
40 TABLET ORAL DAILY
Qty: 90 TABLET | Refills: 1 | Status: SHIPPED | OUTPATIENT
Start: 2024-05-24

## 2024-05-24 NOTE — PROGRESS NOTES
Assessment/Plan:    No problem-specific Assessment & Plan notes found for this encounter.    DM2 criteria aware  Diet/exercise/wt loss/monitor    Ckd2  Stay hydrated    Urology f/u for enlarged prostate   Screening psa ordered    Htn stable  Continue meds  F/u q6m     Diagnoses and all orders for this visit:    Primary hypertension  -     lisinopril (ZESTRIL) 40 mg tablet; Take 1 tablet (40 mg total) by mouth daily    Colon cancer screening  -     Ambulatory referral to Gastroenterology; Future    Screening for diabetes mellitus (DM)  -     Comprehensive metabolic panel; Future    Screening for lipid disorders  -     Lipid Panel with Direct LDL reflex; Future    Prostate cancer screening  -     PSA, Total Screen; Future    Abnormal glucose  -     Hemoglobin A1C; Future    Enlarged prostate with urinary obstruction    CKD (chronic kidney disease) stage 2, GFR 60-89 ml/min        Return in about 6 months (around 11/24/2024) for Recheck.    Subjective:      Patient ID: Joseph Hamilton is a 69 y.o. male.    Chief Complaint   Patient presents with    Follow-up     6 month follow up lw cma       HPI  Labs reviewed  On diet  Low carb  Exercising  Using stationary bike  Gained 9#    The following portions of the patient's history were reviewed and updated as appropriate: allergies, current medications, past family history, past medical history, past social history, past surgical history and problem list.    Review of Systems   Respiratory:  Negative for shortness of breath.    Cardiovascular:  Negative for chest pain.         Current Outpatient Medications   Medication Sig Dispense Refill    Acetaminophen (Tylenol) 325 MG CAPS Take by mouth      amLODIPine (NORVASC) 10 mg tablet Take 1 tablet (10 mg total) by mouth daily 90 tablet 1    Ascorbic Acid (vitamin C) 1000 MG tablet Take 1,000 mg by mouth daily      diclofenac potassium (CATAFLAM) 50 mg tablet Take 1 tablet (50 mg total) by mouth 2 (two) times a day for 5 days 10  "tablet 0    finasteride (PROSCAR) 5 mg tablet Take 1 tablet (5 mg total) by mouth daily 90 tablet 1    lisinopril (ZESTRIL) 40 mg tablet Take 1 tablet (40 mg total) by mouth daily 90 tablet 1    Multiple Vitamin (MULTIVITAMINS PO) Take by mouth Daily      senna (SENOKOT) 8.6 mg Take 1 tablet (8.6 mg total) by mouth daily at bedtime for 5 days 5 tablet 0     No current facility-administered medications for this visit.       Objective:    /70   Pulse 99   Temp 98.7 °F (37.1 °C) (Tympanic)   Resp 16   Ht 5' 8\" (1.727 m)   Wt 108 kg (239 lb)   BMI 36.34 kg/m²        Physical Exam  Vitals and nursing note reviewed.   Constitutional:       General: He is not in acute distress.     Appearance: He is well-developed. He is obese. He is not ill-appearing.   HENT:      Head: Normocephalic.      Right Ear: Tympanic membrane normal.      Left Ear: Tympanic membrane normal.   Eyes:      General: No scleral icterus.     Conjunctiva/sclera: Conjunctivae normal.   Neck:      Vascular: No carotid bruit.   Cardiovascular:      Rate and Rhythm: Normal rate and regular rhythm.      Heart sounds: No murmur heard.  Pulmonary:      Effort: Pulmonary effort is normal. No respiratory distress.      Breath sounds: No wheezing.   Abdominal:      General: There is no distension.      Palpations: Abdomen is soft.      Tenderness: There is no abdominal tenderness.   Musculoskeletal:         General: No deformity.      Cervical back: Neck supple.      Right lower leg: No edema.      Left lower leg: No edema.   Skin:     General: Skin is warm and dry.      Coloration: Skin is not pale.   Neurological:      Mental Status: He is alert.      Motor: No weakness.      Gait: Gait normal.   Psychiatric:         Mood and Affect: Mood normal.         Behavior: Behavior normal.         Thought Content: Thought content normal.                Meliton San, DO    "

## 2024-05-25 DIAGNOSIS — I10 PRIMARY HYPERTENSION: ICD-10-CM

## 2024-05-25 PROBLEM — N18.31 CHRONIC KIDNEY DISEASE, STAGE 3A (HCC): Status: RESOLVED | Noted: 2024-05-24 | Resolved: 2024-05-25

## 2024-05-25 LAB
ALBUMIN SERPL-MCNC: 4.2 G/DL (ref 3.8–4.8)
ALBUMIN/GLOB SERPL: 1.6 {RATIO} (ref 1.2–2.2)
ALP SERPL-CCNC: 73 IU/L (ref 44–121)
ALT SERPL-CCNC: 13 IU/L (ref 0–44)
AST SERPL-CCNC: 16 IU/L (ref 0–40)
BILIRUB SERPL-MCNC: 1 MG/DL (ref 0–1.2)
BUN SERPL-MCNC: 24 MG/DL (ref 8–27)
BUN/CREAT SERPL: 24 (ref 10–24)
CALCIUM SERPL-MCNC: 9.1 MG/DL (ref 8.6–10.2)
CHLORIDE SERPL-SCNC: 101 MMOL/L (ref 96–106)
CO2 SERPL-SCNC: 22 MMOL/L (ref 20–29)
CREAT SERPL-MCNC: 1 MG/DL (ref 0.76–1.27)
EGFR: 80 ML/MIN/1.73
GLOBULIN SER-MCNC: 2.6 G/DL (ref 1.5–4.5)
GLUCOSE SERPL-MCNC: 117 MG/DL (ref 70–99)
HBA1C MFR BLD: 6.5 % (ref 4.8–5.6)
POTASSIUM SERPL-SCNC: 4.1 MMOL/L (ref 3.5–5.2)
PROT SERPL-MCNC: 6.8 G/DL (ref 6–8.5)
SODIUM SERPL-SCNC: 138 MMOL/L (ref 134–144)

## 2024-05-25 RX ORDER — LISINOPRIL 20 MG/1
20 TABLET ORAL DAILY
Qty: 90 TABLET | Refills: 1 | OUTPATIENT
Start: 2024-05-25

## 2024-10-27 DIAGNOSIS — I10 PRIMARY HYPERTENSION: ICD-10-CM

## 2024-10-28 RX ORDER — AMLODIPINE BESYLATE 10 MG/1
10 TABLET ORAL DAILY
Qty: 90 TABLET | Refills: 1 | Status: SHIPPED | OUTPATIENT
Start: 2024-10-28

## 2024-11-15 ENCOUNTER — OFFICE VISIT (OUTPATIENT)
Dept: URGENT CARE | Facility: MEDICAL CENTER | Age: 69
End: 2024-11-15
Payer: MEDICARE

## 2024-11-15 VITALS
HEART RATE: 66 BPM | DIASTOLIC BLOOD PRESSURE: 86 MMHG | TEMPERATURE: 98 F | SYSTOLIC BLOOD PRESSURE: 136 MMHG | RESPIRATION RATE: 20 BRPM | OXYGEN SATURATION: 97 %

## 2024-11-15 DIAGNOSIS — J06.9 URI WITH COUGH AND CONGESTION: Primary | ICD-10-CM

## 2024-11-15 DIAGNOSIS — J02.9 SORE THROAT: ICD-10-CM

## 2024-11-15 LAB — S PYO AG THROAT QL: NEGATIVE

## 2024-11-15 PROCEDURE — G0463 HOSPITAL OUTPT CLINIC VISIT: HCPCS

## 2024-11-15 PROCEDURE — 99203 OFFICE O/P NEW LOW 30 MIN: CPT

## 2024-11-15 PROCEDURE — 87070 CULTURE OTHR SPECIMN AEROBIC: CPT

## 2024-11-15 PROCEDURE — 87880 STREP A ASSAY W/OPTIC: CPT

## 2024-11-15 RX ORDER — BENZONATATE 100 MG/1
100 CAPSULE ORAL 3 TIMES DAILY PRN
Qty: 20 CAPSULE | Refills: 0 | Status: SHIPPED | OUTPATIENT
Start: 2024-11-15

## 2024-11-15 RX ORDER — FLUTICASONE PROPIONATE 50 MCG
1 SPRAY, SUSPENSION (ML) NASAL DAILY
Qty: 11.1 ML | Refills: 0 | Status: SHIPPED | OUTPATIENT
Start: 2024-11-15

## 2024-11-15 NOTE — PATIENT INSTRUCTIONS
Your symptoms are most likely related to a viral infection.  Viral infections can sometimes last for 10-14 days and symptoms are usually most intense at days 3 through 5. Your symptoms should begin to improve after 1 week. You may use over-the-counter cough and cold medication such as Mucinex for cough and chest congestion.  We recommend trialing warm salt water gargles, warm tea with honey, Chloraseptic spray or Cepacol cough drops as needed for sore throat.  It is also recommended to ensure adequate fluid intake. Flonase may help with congestion and post nasal drip. You may alternate Motrin and Tylenol as needed for fever and pain.  If your symptoms persist, please follow-up with your primary care provider for further evaluation.  If your symptoms worsen, or you develop new, worsening or concerning symptoms, please go to the ER for further workup and evaluation.    Please trial Tessalon every 8 hours as needed for cough.   Please trial Flonase daily.   Continue with OTC cough and cold medication.   Drink plenty of fluids.
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
## 2024-11-17 LAB — BACTERIA THROAT CULT: NORMAL

## 2024-12-02 ENCOUNTER — RA CDI HCC (OUTPATIENT)
Dept: OTHER | Facility: HOSPITAL | Age: 69
End: 2024-12-02

## 2025-01-09 ENCOUNTER — OFFICE VISIT (OUTPATIENT)
Dept: FAMILY MEDICINE CLINIC | Facility: CLINIC | Age: 70
End: 2025-01-09
Payer: COMMERCIAL

## 2025-01-09 ENCOUNTER — TELEPHONE (OUTPATIENT)
Age: 70
End: 2025-01-09

## 2025-01-09 VITALS
TEMPERATURE: 96.8 F | RESPIRATION RATE: 18 BRPM | BODY MASS INDEX: 36.53 KG/M2 | SYSTOLIC BLOOD PRESSURE: 124 MMHG | HEART RATE: 68 BPM | DIASTOLIC BLOOD PRESSURE: 60 MMHG | WEIGHT: 241 LBS | HEIGHT: 68 IN

## 2025-01-09 DIAGNOSIS — E66.9 OBESITY (BMI 30-39.9): ICD-10-CM

## 2025-01-09 DIAGNOSIS — Z00.00 MEDICARE ANNUAL WELLNESS VISIT, SUBSEQUENT: Primary | ICD-10-CM

## 2025-01-09 DIAGNOSIS — N40.1 ENLARGED PROSTATE WITH URINARY OBSTRUCTION: ICD-10-CM

## 2025-01-09 DIAGNOSIS — N13.8 ENLARGED PROSTATE WITH URINARY OBSTRUCTION: ICD-10-CM

## 2025-01-09 DIAGNOSIS — I10 PRIMARY HYPERTENSION: ICD-10-CM

## 2025-01-09 DIAGNOSIS — R73.09 ABNORMAL GLUCOSE: ICD-10-CM

## 2025-01-09 DIAGNOSIS — Z12.11 COLON CANCER SCREENING: ICD-10-CM

## 2025-01-09 PROCEDURE — 99214 OFFICE O/P EST MOD 30 MIN: CPT | Performed by: FAMILY MEDICINE

## 2025-01-09 PROCEDURE — G0439 PPPS, SUBSEQ VISIT: HCPCS | Performed by: FAMILY MEDICINE

## 2025-01-09 RX ORDER — LISINOPRIL 40 MG/1
40 TABLET ORAL DAILY
Qty: 90 TABLET | Refills: 1 | Status: SHIPPED | OUTPATIENT
Start: 2025-01-09

## 2025-01-09 RX ORDER — AMLODIPINE BESYLATE 10 MG/1
10 TABLET ORAL DAILY
Qty: 100 TABLET | Refills: 1 | Status: SHIPPED | OUTPATIENT
Start: 2025-01-09

## 2025-01-09 RX ORDER — FINASTERIDE 5 MG/1
5 TABLET, FILM COATED ORAL DAILY
Qty: 100 TABLET | Refills: 1 | Status: SHIPPED | OUTPATIENT
Start: 2025-01-09

## 2025-01-09 NOTE — PROGRESS NOTES
"Name: Joseph Hamilton      : 1955      MRN: [id number]  Encounter Provider: Meliton San DO  Encounter Date: 2025   Encounter department: [facility]  :  Assessment & Plan  Colon cancer screening  suggested  Orders:    Ambulatory referral to Gastroenterology; Future    Primary hypertension  Stable on meds  F/u q6m  Orders:    lisinopril (ZESTRIL) 40 mg tablet; Take 1 tablet (40 mg total) by mouth daily    amLODIPine (NORVASC) 10 mg tablet; Take 1 tablet (10 mg total) by mouth daily    Enlarged prostate with urinary obstruction  Unchanged  F/u urology  Advised pt to have urology refill proscar after current refill  Orders:    finasteride (PROSCAR) 5 mg tablet; Take 1 tablet (5 mg total) by mouth daily    Obesity (BMI 30-39.9)  Wt loss suggested           Abnormal glucose  Borderline DM2  Diet advised  If meets criteria, may need to start meds       Medicare annual wellness visit, subsequent                History of Present Illness     HPI  Tolerating meds  No edema  No cough  Slight wt gain  Not exercise walking  TIMUR diet  Did not do labs yet    Review of Systems   Respiratory:  Negative for shortness of breath.    Cardiovascular:  Negative for chest pain.     Current Outpatient Medications   Medication Instructions    Acetaminophen (Tylenol) 325 MG CAPS Take by mouth    amLODIPine (NORVASC) 10 mg, Oral, Daily    finasteride (PROSCAR) 5 mg, Oral, Daily    fluticasone (FLONASE) 50 mcg/act nasal spray 1 spray, Nasal, Daily    lisinopril (ZESTRIL) 40 mg, Oral, Daily    Multiple Vitamin (MULTIVITAMINS PO) Daily    vitamin C 1,000 mg, Daily         Objective   /60   Pulse 68   Temp (!) 96.8 °F (36 °C)   Resp 18   Ht 5' 8\" (1.727 m)   Wt 109 kg (241 lb)   BMI 36.64 kg/m²      Physical Exam  Vitals and nursing note reviewed.   Constitutional:       General: He is not in acute distress.     Appearance: He is well-developed. He is obese. He is not ill-appearing.   HENT:      Head: " Normocephalic.      Right Ear: Tympanic membrane normal.      Left Ear: Tympanic membrane normal.      Mouth/Throat:      Mouth: Mucous membranes are moist.   Eyes:      General: No scleral icterus.     Conjunctiva/sclera: Conjunctivae normal.   Neck:      Vascular: No carotid bruit.   Cardiovascular:      Rate and Rhythm: Normal rate and regular rhythm.      Heart sounds: No murmur heard.  Pulmonary:      Effort: Pulmonary effort is normal. No respiratory distress.      Breath sounds: No wheezing.   Abdominal:      General: There is no distension.      Palpations: Abdomen is soft.      Tenderness: There is no abdominal tenderness.   Musculoskeletal:         General: No deformity.      Cervical back: Neck supple.      Right lower leg: No edema.      Left lower leg: No edema.   Skin:     General: Skin is warm and dry.      Coloration: Skin is not jaundiced or pale.   Neurological:      Mental Status: He is alert.      Motor: No weakness.      Gait: Gait normal.   Psychiatric:         Mood and Affect: Mood normal.         Behavior: Behavior normal.         Thought Content: Thought content normal.

## 2025-01-10 NOTE — PROGRESS NOTES
Joseph is here for his Subsequent Wellness visit. Last Medicare Wellness visit information reviewed, patient interviewed and updates made to the record today.      Health Risk Assessment:   Patient rates overall health as good. Patient feels that their physical health rating is same. Patient is very satisfied with their life. Eyesight was rated as same. Hearing was rated as same. Patient feels that their emotional and mental health rating is same. Patients states they are never, rarely angry. Patient states they are never, rarely unusually tired/fatigued. Pain experienced in the last 7 days has been none. Patient states that he has experienced weight loss or gain in last 6 months.     Depression Screening:   PHQ-2 Score: 0      Fall Risk Screening:   In the past year, patient has experienced: no history of falling in past year      Home Safety:  Patient does not have trouble with stairs inside or outside of their home. Patient has working smoke alarms and has working carbon monoxide detector. Home safety hazards include: none.     Nutrition:   Current diet is Regular, No Added Salt, Low Saturated Fat and Low Carb.     Medications:   Patient is currently taking over-the-counter supplements. OTC medications include: see medication list. Patient is able to manage medications.     Activities of Daily Living (ADLs)/Instrumental Activities of Daily Living (IADLs):   Walk and transfer into and out of bed and chair?: Yes  Dress and groom yourself?: Yes    Bathe or shower yourself?: Yes    Feed yourself? Yes  Do your laundry/housekeeping?: Yes  Manage your money, pay your bills and track your expenses?: Yes  Make your own meals?: Yes    Do your own shopping?: Yes    Previous Hospitalizations:   Any hospitalizations or ED visits within the last 12 months?: No      Advance Care Planning:   Living will: No    Durable POA for healthcare: Yes    Advanced directive: Yes    End of Life Decisions reviewed with patient: No       Cognitive Screening:   Provider or family/friend/caregiver concerned regarding cognition?: No    PREVENTIVE SCREENINGS      Cardiovascular Screening:    General: Screening Current      Diabetes Screening:     General: Screening Current      Colorectal Cancer Screening:     General: Screening Current      Prostate Cancer Screening:    General: History Prostate Cancer and Screening Not Indicated      Osteoporosis Screening:    General: Screening Not Indicated      Abdominal Aortic Aneurysm (AAA) Screening:    Risk factors include: age between 65-76 yo and tobacco use        Lung Cancer Screening:     General: Screening Not Indicated      Hepatitis C Screening:    General: Screening Current    Hep C Screening Accepted: No     Screening, Brief Intervention, and Referral to Treatment (SBIRT)    Screening  Typical number of drinks in a day: 0  Typical number of drinks in a week: 0  Interpretation: Low risk drinking behavior.    Single Item Drug Screening:  How often have you used an illegal drug (including marijuana) or a prescription medication for non-medical reasons in the past year? never    Single Item Drug Screen Score: 0  Interpretation: Negative screen for possible drug use disorder    Other Counseling Topics:   Car/seat belt/driving safety and regular weightbearing exercise.

## 2025-01-10 NOTE — ASSESSMENT & PLAN NOTE
Unchanged  F/u urology  Advised pt to have urology refill proscar after current refill  Orders:    finasteride (PROSCAR) 5 mg tablet; Take 1 tablet (5 mg total) by mouth daily

## 2025-01-10 NOTE — ASSESSMENT & PLAN NOTE
Stable on meds  F/u q6m  Orders:    lisinopril (ZESTRIL) 40 mg tablet; Take 1 tablet (40 mg total) by mouth daily    amLODIPine (NORVASC) 10 mg tablet; Take 1 tablet (10 mg total) by mouth daily

## 2025-01-16 DIAGNOSIS — Z12.5 PROSTATE CANCER SCREENING: ICD-10-CM

## 2025-01-16 DIAGNOSIS — Z13.220 SCREENING FOR LIPID DISORDERS: ICD-10-CM

## 2025-01-16 DIAGNOSIS — Z13.1 SCREENING FOR DIABETES MELLITUS (DM): ICD-10-CM

## 2025-01-16 DIAGNOSIS — I10 PRIMARY HYPERTENSION: ICD-10-CM

## 2025-01-16 DIAGNOSIS — R73.09 ABNORMAL GLUCOSE: ICD-10-CM

## 2025-02-01 ENCOUNTER — RESULTS FOLLOW-UP (OUTPATIENT)
Dept: FAMILY MEDICINE CLINIC | Facility: CLINIC | Age: 70
End: 2025-02-01

## 2025-02-08 PROBLEM — Z00.00 MEDICARE ANNUAL WELLNESS VISIT, SUBSEQUENT: Status: RESOLVED | Noted: 2025-01-09 | Resolved: 2025-02-08

## 2025-05-11 DIAGNOSIS — N13.8 ENLARGED PROSTATE WITH URINARY OBSTRUCTION: ICD-10-CM

## 2025-05-11 DIAGNOSIS — I10 PRIMARY HYPERTENSION: ICD-10-CM

## 2025-05-11 DIAGNOSIS — N40.1 ENLARGED PROSTATE WITH URINARY OBSTRUCTION: ICD-10-CM

## 2025-05-11 RX ORDER — LISINOPRIL 40 MG/1
40 TABLET ORAL DAILY
Qty: 90 TABLET | Refills: 1 | Status: SHIPPED | OUTPATIENT
Start: 2025-05-11

## 2025-05-11 RX ORDER — AMLODIPINE BESYLATE 10 MG/1
10 TABLET ORAL DAILY
Qty: 90 TABLET | Refills: 2 | Status: SHIPPED | OUTPATIENT
Start: 2025-05-11

## 2025-05-11 RX ORDER — FINASTERIDE 5 MG/1
5 TABLET, FILM COATED ORAL DAILY
Qty: 90 TABLET | Refills: 2 | Status: SHIPPED | OUTPATIENT
Start: 2025-05-11

## 2025-07-11 ENCOUNTER — OFFICE VISIT (OUTPATIENT)
Dept: FAMILY MEDICINE CLINIC | Facility: CLINIC | Age: 70
End: 2025-07-11
Payer: MEDICARE

## 2025-07-11 VITALS
SYSTOLIC BLOOD PRESSURE: 124 MMHG | RESPIRATION RATE: 16 BRPM | TEMPERATURE: 97.9 F | WEIGHT: 246.4 LBS | HEART RATE: 68 BPM | BODY MASS INDEX: 37.35 KG/M2 | DIASTOLIC BLOOD PRESSURE: 68 MMHG | HEIGHT: 68 IN

## 2025-07-11 DIAGNOSIS — N40.1 ENLARGED PROSTATE WITH URINARY OBSTRUCTION: ICD-10-CM

## 2025-07-11 DIAGNOSIS — N13.8 ENLARGED PROSTATE WITH URINARY OBSTRUCTION: ICD-10-CM

## 2025-07-11 DIAGNOSIS — Z12.11 COLON CANCER SCREENING: ICD-10-CM

## 2025-07-11 DIAGNOSIS — I10 PRIMARY HYPERTENSION: Primary | ICD-10-CM

## 2025-07-11 DIAGNOSIS — R73.09 ABNORMAL GLUCOSE: ICD-10-CM

## 2025-07-11 PROCEDURE — G2211 COMPLEX E/M VISIT ADD ON: HCPCS | Performed by: FAMILY MEDICINE

## 2025-07-11 PROCEDURE — 99214 OFFICE O/P EST MOD 30 MIN: CPT | Performed by: FAMILY MEDICINE

## 2025-07-11 NOTE — ASSESSMENT & PLAN NOTE
Has gained wt  He feels he can do more TLC if A1c is between 7-8 before recheck in 3m  Advised if over 9 or 10, would suggest medication and TLC  Orders:    Comprehensive metabolic panel; Future    Hemoglobin A1C; Future

## 2025-07-11 NOTE — PROGRESS NOTES
"Name: Joseph Hamilton      : 1955      MRN: [id number]  Encounter Provider: Meliton San DO  Encounter Date: 2025   Encounter department: [facility]  :  Assessment & Plan  Colon cancer screening  recommended  Orders:    Ambulatory referral to Gastroenterology; Future    Primary hypertension  Stable on meds  More TLC suggested  Orders:    Comprehensive metabolic panel; Future    Hemoglobin A1C; Future    Abnormal glucose  Has gained wt  He feels he can do more TLC if A1c is between 7-8 before recheck in   Advised if over 9 or 10, would suggest medication and TLC  Orders:    Comprehensive metabolic panel; Future    Hemoglobin A1C; Future    Enlarged prostate with urinary obstruction  Stable on meds              History of Present Illness   HPI  Taking meds  Walking more  Stationary bike  Portion control per pt but has gained weight  Very close to DM on last labs  Criteria for dx/tx advised  No cough or edema    Review of Systems   Respiratory:  Negative for wheezing.    Cardiovascular:  Negative for leg swelling.     Family History[1] was reviewed  Social History[2] was reviewed    Current Outpatient Medications   Medication Instructions    Acetaminophen (Tylenol) 325 MG CAPS Take by mouth    amLODIPine (NORVASC) 10 mg, Oral, Daily    finasteride (PROSCAR) 5 mg, Oral, Daily    fluticasone (FLONASE) 50 mcg/act nasal spray 1 spray, Nasal, Daily    lisinopril (ZESTRIL) 40 mg, Oral, Daily    Multiple Vitamin (MULTIVITAMINS PO) Daily    vitamin C 1,000 mg, Daily     >>>>>>>>  Return in about 6 months (around 2026) for Recheck.  >>>>>>>>    [POCT]  No results found for this or any previous visit (from the past 24 hours).    Visit Vitals  /68   Pulse 68   Temp 97.9 °F (36.6 °C)   Resp 16   Ht 5' 8\" (1.727 m)   Wt 112 kg (246 lb 6.4 oz)   BMI 37.46 kg/m²   Smoking Status Former   BSA 2.24 m²        Objective   /68   Pulse 68   Temp 97.9 °F (36.6 °C)   Resp 16   Ht 5' 8\" " (1.727 m)   Wt 112 kg (246 lb 6.4 oz)   BMI 37.46 kg/m²      Physical Exam  Vitals and nursing note reviewed.   Constitutional:       General: He is not in acute distress.     Appearance: He is well-developed. He is obese. He is not ill-appearing.   HENT:      Head: Normocephalic.      Right Ear: Tympanic membrane normal.      Left Ear: Tympanic membrane normal.      Nose: No congestion.      Mouth/Throat:      Mouth: Mucous membranes are moist.     Eyes:      General: No scleral icterus.     Conjunctiva/sclera: Conjunctivae normal.       Cardiovascular:      Rate and Rhythm: Normal rate and regular rhythm.      Heart sounds: No murmur heard.  Pulmonary:      Effort: Pulmonary effort is normal. No respiratory distress.      Breath sounds: No wheezing.   Abdominal:      Palpations: Abdomen is soft.      Tenderness: There is no abdominal tenderness.     Musculoskeletal:         General: No deformity.      Cervical back: Neck supple.      Right lower leg: No edema.      Left lower leg: No edema.     Skin:     General: Skin is warm and dry.      Coloration: Skin is not jaundiced or pale.     Neurological:      Mental Status: He is alert.      Motor: No weakness.      Gait: Gait normal.     Psychiatric:         Mood and Affect: Mood normal.         Behavior: Behavior normal.         Thought Content: Thought content normal.                [1]   Family History  Problem Relation Name Age of Onset    Diabetes Mother          MELLITUS    Hypertension Mother     [2]   Social History  Tobacco Use    Smoking status: Former     Current packs/day: 0.00     Types: Cigarettes     Quit date:      Years since quittin.5     Passive exposure: Past    Smokeless tobacco: Never   Vaping Use    Vaping status: Never Used   Substance Use Topics    Alcohol use: Not Currently     Comment: quit 40 yrs ago    Drug use: Never

## 2025-07-11 NOTE — ASSESSMENT & PLAN NOTE
Stable on meds  More TLC suggested  Orders:    Comprehensive metabolic panel; Future    Hemoglobin A1C; Future

## 2025-08-02 LAB
ALBUMIN SERPL-MCNC: 4.2 G/DL (ref 3.5–5.7)
ALP SERPL-CCNC: 62 U/L (ref 35–120)
ALT SERPL-CCNC: 11 U/L
ANION GAP SERPL CALCULATED.3IONS-SCNC: 11 MMOL/L (ref 3–11)
AST SERPL-CCNC: 14 U/L
BILIRUB SERPL-MCNC: 1.2 MG/DL (ref 0.2–1)
BUN SERPL-MCNC: 16 MG/DL (ref 7–28)
CALCIUM SERPL-MCNC: 9.5 MG/DL (ref 8.5–10.5)
CHLORIDE SERPL-SCNC: 100 MMOL/L (ref 100–109)
CO2 SERPL-SCNC: 26 MMOL/L (ref 21–31)
CREAT SERPL-MCNC: 0.95 MG/DL (ref 0.53–1.3)
CYTOLOGY CMNT CVX/VAG CYTO-IMP: ABNORMAL
EST. AVERAGE GLUCOSE BLD GHB EST-MCNC: 146 MG/DL
GFR/BSA.PRED SERPLBLD CYS-BASED-ARV: 85 ML/MIN/{1.73_M2}
GLUCOSE SERPL-MCNC: 110 MG/DL (ref 65–99)
HBA1C MFR BLD: 6.7 %
POTASSIUM SERPL-SCNC: 4.1 MMOL/L (ref 3.5–5.2)
PROT SERPL-MCNC: 6.9 G/DL (ref 6.3–8.3)
SODIUM SERPL-SCNC: 137 MMOL/L (ref 135–145)

## (undated) DEVICE — PREMIUM DRY TRAY LF: Brand: MEDLINE INDUSTRIES, INC.

## (undated) DEVICE — EVACUATOR BLADDER ELLIK DISP STRL

## (undated) DEVICE — DISPOSABLE OR TOWEL: Brand: CARDINAL HEALTH

## (undated) DEVICE — LUBRICANT SURGILUBE TUBE 4 OZ  FLIP TOP

## (undated) DEVICE — INVIEW CLEAR LEGGINGS: Brand: CONVERTORS

## (undated) DEVICE — CHLORHEXIDINE 4PCT 4 OZ

## (undated) DEVICE — BASIC SINGLE BASIN 2-LF: Brand: MEDLINE INDUSTRIES, INC.

## (undated) DEVICE — CATH FOLEY HEMATURIA 24FR 30ML 3 WAY LUBRICATH

## (undated) DEVICE — CYSTO TUBING TUR Y IRRIGATION

## (undated) DEVICE — BAG URINE DRAINAGE 4000ML CONTINUOUS IRR

## (undated) DEVICE — Device

## (undated) DEVICE — HF-RESECTION ELECTRODE PLASMALOOP LOOP, LARGE, 24 FR., 12°/16°, ESG TURIS: Brand: OLYMPUS

## (undated) DEVICE — GLOVE SRG BIOGEL ECLIPSE 7.5

## (undated) DEVICE — TUBING SUCTION 5MM X 12 FT

## (undated) DEVICE — PACK TUR

## (undated) DEVICE — GLOVE INDICATOR PI UNDERGLOVE SZ 8 BLUE

## (undated) DEVICE — UROCATCH BAG

## (undated) DEVICE — CYSTO TUBING SINGLE IRRIGATION

## (undated) DEVICE — PAD GROUNDING ADULT